# Patient Record
Sex: MALE | Race: WHITE | NOT HISPANIC OR LATINO | Employment: OTHER | URBAN - NONMETROPOLITAN AREA
[De-identification: names, ages, dates, MRNs, and addresses within clinical notes are randomized per-mention and may not be internally consistent; named-entity substitution may affect disease eponyms.]

---

## 2018-05-24 ENCOUNTER — PROCEDURE VISIT (OUTPATIENT)
Dept: OTOLARYNGOLOGY | Facility: CLINIC | Age: 78
End: 2018-05-24

## 2018-05-24 ENCOUNTER — OFFICE VISIT (OUTPATIENT)
Dept: OTOLARYNGOLOGY | Facility: CLINIC | Age: 78
End: 2018-05-24

## 2018-05-24 VITALS
HEIGHT: 74 IN | TEMPERATURE: 97.6 F | HEART RATE: 64 BPM | DIASTOLIC BLOOD PRESSURE: 74 MMHG | SYSTOLIC BLOOD PRESSURE: 145 MMHG | RESPIRATION RATE: 20 BRPM | WEIGHT: 178 LBS | BODY MASS INDEX: 22.84 KG/M2

## 2018-05-24 DIAGNOSIS — H90.A31 MIXED CONDUCTIVE AND SENSORINEURAL HEARING LOSS OF RIGHT EAR WITH RESTRICTED HEARING OF LEFT EAR: ICD-10-CM

## 2018-05-24 DIAGNOSIS — H90.71 MIXED CONDUCTIVE AND SENSORINEURAL HEARING LOSS OF RIGHT EAR, UNSPECIFIED HEARING STATUS ON CONTRALATERAL SIDE: ICD-10-CM

## 2018-05-24 DIAGNOSIS — H62.41 OTOMYCOSIS OF RIGHT EAR: Primary | ICD-10-CM

## 2018-05-24 DIAGNOSIS — H90.A22 SENSORINEURAL HEARING LOSS (SNHL) OF LEFT EAR WITH RESTRICTED HEARING OF RIGHT EAR: Primary | ICD-10-CM

## 2018-05-24 DIAGNOSIS — B36.9 OTOMYCOSIS OF RIGHT EAR: Primary | ICD-10-CM

## 2018-05-24 DIAGNOSIS — H90.5 SENSORINEURAL HEARING LOSS (SNHL) OF LEFT EAR, UNSPECIFIED HEARING STATUS ON CONTRALATERAL SIDE: ICD-10-CM

## 2018-05-24 PROCEDURE — 99203 OFFICE O/P NEW LOW 30 MIN: CPT | Performed by: NURSE PRACTITIONER

## 2018-05-24 RX ORDER — CLOTRIMAZOLE 1 G/ML
SOLUTION TOPICAL 2 TIMES DAILY
Qty: 15 ML | Refills: 0 | Status: SHIPPED | OUTPATIENT
Start: 2018-05-24 | End: 2018-06-03

## 2018-05-24 NOTE — PATIENT INSTRUCTIONS
(1) See the medical provider as scheduled due to the patient complaints as well as the MHL noted at the right ear.  (2) Receive audiological testing following completion of treatment of the right ear.

## 2018-05-24 NOTE — PROGRESS NOTES
PRIMARY CARE PROVIDER: Woody Mathis DO  REFERRING PROVIDER: No ref. provider found    Chief Complaint   Patient presents with   • Ear Problem     hearing loss       Subjective   History of Present Illness:  Aram Nunez is a  78 y.o. male who complains of ear fullness, white otorrhea and decreased hearing. The symptoms are localized to the right ear. The patient has had moderate symptoms. The symptoms have been present for the last 2-3 months. There have been no identified factors that aggravate the symptoms. There have been no factors that have improved the symptoms. He has been treated with oral steroids and antibiotics without improvement in symptoms. He denies otalgia, dizziness, or vertigo.     Review of Systems:  Review of Systems   Constitutional: Negative for chills and fever.   HENT: Positive for ear discharge and hearing loss. Negative for congestion, ear pain, rhinorrhea, sinus pain, sinus pressure, trouble swallowing and voice change.    All other systems reviewed and are negative.      Past History:  Past Medical History:   Diagnosis Date   • HL (hearing loss)      History reviewed. No pertinent surgical history.  History reviewed. No pertinent family history.  Social History   Substance Use Topics   • Smoking status: Never Smoker   • Smokeless tobacco: Never Used   • Alcohol use No     Allergies:  Patient has no known allergies.    Current Outpatient Prescriptions:   •  clotrimazole (LOTRIMIN) 1 % external solution, Apply  topically 2 (Two) Times a Day for 10 days. 3-4 drops in affected ear twice a day, Disp: 15 mL, Rfl: 0      Objective     Vital Signs:  Temp:  [97.6 °F (36.4 °C)] 97.6 °F (36.4 °C)  Heart Rate:  [64] 64  Resp:  [20] 20  BP: (145)/(74) 145/74    Physical Exam:  Physical Exam  CONSTITUTIONAL: well nourished, well-developed, alert, oriented, in no acute distress   COMMUNICATION AND VOICE: able to communicate normally, normal voice quality  HEAD: normocephalic, no lesions,  atraumatic, no tenderness, no masses   FACE: appearance normal, no lesions, no tenderness, no deformities, facial motion symmetric  SALIVARY GLANDS: parotid glands with no tenderness, no swelling, no masses, submandibular glands with normal size, nontender  EYES: ocular motility normal, eyelids normal, orbits normal, no proptosis, conjunctiva normal , pupils equal, round   EARS:  Hearing: response to conversational voice normal bilaterally   External Ears: auricles without lesions  Otoscopic: left EAC and tympanic membrane appearance normal, no lesions, no perforation, normal mobility, no fluid; right EAC with large amount of fungal debris removed with suction, mild inflammation of right EAC and TM, TM without perforation   NOSE:  External Nose: structure normal, no tenderness on palpation, no nasal discharge, no lesions, no evidence of trauma, nostrils patent   ORAL:  Lips: upper and lower lips without lesion   NECK: neck appearance normal, no masses or tenderness  LYMPH NODES: no lymphadenopathy  CHEST/RESPIRATORY: respiratory effort normal, normal breath sounds   CARDIOVASCULAR: rate and rhythm normal, extremities without cyanosis or edema    NEUROLOGIC/PSYCHIATRIC: oriented to time, place and person, mood normal, affect appropriate, CN II-XII intact grossly    Results Review:       Assessment   Assessment:  1. Otomycosis of right ear    2. Mixed conductive and sensorineural hearing loss of right ear, unspecified hearing status on contralateral side    3. Sensorineural hearing loss (SNHL) of left ear, unspecified hearing status on contralateral side        Plan   Plan:    New Medications Ordered This Visit   Medications   • clotrimazole (LOTRIMIN) 1 % external solution     Sig: Apply  topically 2 (Two) Times a Day for 10 days. 3-4 drops in affected ear twice a day     Dispense:  15 mL     Refill:  0     Start Lotrimin. The possible ototoxic side effects of Lotrimin were discussed and patient verbalized  understanding and wished to proceed with use. Do not wear hearing aid in right ear. Dry ear precautions. Call for ear drainage, ear pain, fever over 101, or hearing loss. Call for problems or worsening symptoms. We will repeat audio after symptoms resolve.    Return in about 4 weeks (around 6/21/2018), or if symptoms worsen or fail to improve, for Recheck.    My findings and recommendations were discussed and questions were answered.     Prisca Talley, APRN  05/24/18  3:29 PM

## 2018-06-21 ENCOUNTER — OFFICE VISIT (OUTPATIENT)
Dept: OTOLARYNGOLOGY | Facility: CLINIC | Age: 78
End: 2018-06-21

## 2018-06-21 VITALS
HEART RATE: 80 BPM | BODY MASS INDEX: 22.07 KG/M2 | DIASTOLIC BLOOD PRESSURE: 76 MMHG | HEIGHT: 74 IN | SYSTOLIC BLOOD PRESSURE: 125 MMHG | WEIGHT: 172 LBS | TEMPERATURE: 97 F | RESPIRATION RATE: 20 BRPM

## 2018-06-21 DIAGNOSIS — H69.80 DYSFUNCTION OF EUSTACHIAN TUBE, UNSPECIFIED LATERALITY: ICD-10-CM

## 2018-06-21 DIAGNOSIS — H90.A22 SENSORINEURAL HEARING LOSS (SNHL) OF LEFT EAR WITH RESTRICTED HEARING OF RIGHT EAR: Primary | ICD-10-CM

## 2018-06-21 DIAGNOSIS — H90.71 MIXED CONDUCTIVE AND SENSORINEURAL HEARING LOSS OF RIGHT EAR, UNSPECIFIED HEARING STATUS ON CONTRALATERAL SIDE: ICD-10-CM

## 2018-06-21 DIAGNOSIS — J30.9 ALLERGIC RHINITIS, UNSPECIFIED CHRONICITY, UNSPECIFIED SEASONALITY, UNSPECIFIED TRIGGER: ICD-10-CM

## 2018-06-21 PROCEDURE — 99214 OFFICE O/P EST MOD 30 MIN: CPT | Performed by: NURSE PRACTITIONER

## 2018-06-21 RX ORDER — ACETIC ACID 20.65 MG/ML
3 SOLUTION AURICULAR (OTIC) 3 TIMES DAILY
Qty: 15 ML | Refills: 1 | Status: SHIPPED | OUTPATIENT
Start: 2018-06-21 | End: 2018-07-01

## 2018-06-21 RX ORDER — FLUTICASONE PROPIONATE 50 MCG
2 SPRAY, SUSPENSION (ML) NASAL DAILY
Qty: 1 BOTTLE | Refills: 6 | Status: SHIPPED | OUTPATIENT
Start: 2018-06-21 | End: 2018-07-21

## 2018-06-21 NOTE — PROGRESS NOTES
PRIMARY CARE PROVIDER: Woody Mathis DO  REFERRING PROVIDER: No ref. provider found    Chief Complaint   Patient presents with   • Follow-up     EARS / HEARING LOSS       Subjective   History of Present Illness:  Aram Nunez is a  78 y.o. male who complains of ear fullness, white otorrhea and decreased hearing. The symptoms are localized to the right ear. The patient has had moderate, improving symptoms. The symptoms have been present for the last 3-4 months. There have been no identified factors that aggravate the symptoms. The symptoms have been improved by Lotrimin drops. He has been treated with oral steroids and antibiotics without improvement in symptoms. He denies otalgia, dizziness, or vertigo.     Review of Systems:  Review of Systems   Constitutional: Negative for chills and fever.   HENT: Positive for ear discharge and hearing loss. Negative for congestion, ear pain, rhinorrhea, sinus pain, sinus pressure, trouble swallowing and voice change.    All other systems reviewed and are negative.      Past History:  Past Medical History:   Diagnosis Date   • HL (hearing loss)      History reviewed. No pertinent surgical history.  History reviewed. No pertinent family history.  Social History   Substance Use Topics   • Smoking status: Never Smoker   • Smokeless tobacco: Never Used   • Alcohol use No     Allergies:  Patient has no known allergies.    Current Outpatient Prescriptions:   •  acetic acid (VOSOL) 2 % otic solution, Administer 3 drops to the right ear 3 (Three) Times a Day for 10 days., Disp: 15 mL, Rfl: 1  •  fluticasone (FLONASE) 50 MCG/ACT nasal spray, 2 sprays into each nostril Daily for 30 days. Administer 2 sprays in each nostril for each dose., Disp: 1 bottle, Rfl: 6      Objective     Vital Signs:  Temp:  [97 °F (36.1 °C)] 97 °F (36.1 °C)  Heart Rate:  [80] 80  Resp:  [20] 20  BP: (125)/(76) 125/76    Physical Exam:  Physical Exam  CONSTITUTIONAL: well nourished, well-developed,  alert, oriented, in no acute distress   COMMUNICATION AND VOICE: able to communicate normally, normal voice quality  HEAD: normocephalic, no lesions, atraumatic, no tenderness, no masses   FACE: appearance normal, no lesions, no tenderness, no deformities, facial motion symmetric  SALIVARY GLANDS: parotid glands with no tenderness, no swelling, no masses, submandibular glands with normal size, nontender  EYES: ocular motility normal, eyelids normal, orbits normal, no proptosis, conjunctiva normal , pupils equal, round   EARS:  Hearing: response to conversational voice normal bilaterally   External Ears: auricles without lesions  Otoscopic: left EAC and tympanic membrane appearance normal, no lesions, no perforation, normal mobility, no fluid; right EAC fungal debris resolved; right TM without perforation or effusion, type A tympanogram right ear  NOSE:  External Nose: structure normal, no tenderness on palpation, no nasal discharge, no lesions, no evidence of trauma, nostrils patent   Intranasal Exam: nasal mucosa inflammation, vestibule within normal limits, inferior turbinate normal, nasal septum midline   ORAL:  Lips: upper and lower lips without lesion   NECK: neck appearance normal, no masses or tenderness  LYMPH NODES: no lymphadenopathy  CHEST/RESPIRATORY: respiratory effort normal, normal breath sounds   CARDIOVASCULAR: rate and rhythm normal, extremities without cyanosis or edema    NEUROLOGIC/PSYCHIATRIC: oriented to time, place and person, mood normal, affect appropriate, CN II-XII intact grossly    Results Review:       Assessment   Assessment:  1. Sensorineural hearing loss (SNHL) of left ear with restricted hearing of right ear    2. Mixed conductive and sensorineural hearing loss of right ear, unspecified hearing status on contralateral side    3. Allergic rhinitis, unspecified chronicity, unspecified seasonality, unspecified trigger    4. Dysfunction of Eustachian tube, unspecified laterality         Plan   Plan:    New Medications Ordered This Visit   Medications   • fluticasone (FLONASE) 50 MCG/ACT nasal spray     Si sprays into each nostril Daily for 30 days. Administer 2 sprays in each nostril for each dose.     Dispense:  1 bottle     Refill:  6   • acetic acid (VOSOL) 2 % otic solution     Sig: Administer 3 drops to the right ear 3 (Three) Times a Day for 10 days.     Dispense:  15 mL     Refill:  1     Otomycosis resolved. Dry ear precautions. Volsol PRN. Call for ear drainage, ear pain, fever over 101, or hearing loss. Call for problems or worsening symptoms.     Start Flonase. The proper use of nasal inhalers was discussed including the need for regular use and build up of drug levels before full effects. We will repeat audio on follow-up    Return in about 3 months (around 2018) for With Audio.    My findings and recommendations were discussed and questions were answered.     Prisca Talley, DERREK  18  1:46 PM

## 2018-12-24 ENCOUNTER — APPOINTMENT (OUTPATIENT)
Dept: GENERAL RADIOLOGY | Facility: HOSPITAL | Age: 78
End: 2018-12-24

## 2018-12-24 ENCOUNTER — APPOINTMENT (OUTPATIENT)
Dept: CT IMAGING | Facility: HOSPITAL | Age: 78
End: 2018-12-24

## 2018-12-24 ENCOUNTER — HOSPITAL ENCOUNTER (EMERGENCY)
Facility: HOSPITAL | Age: 78
Discharge: HOME | End: 2018-12-24
Attending: EMERGENCY MEDICINE | Admitting: EMERGENCY MEDICINE

## 2018-12-24 VITALS
HEART RATE: 80 BPM | BODY MASS INDEX: 21.66 KG/M2 | DIASTOLIC BLOOD PRESSURE: 86 MMHG | OXYGEN SATURATION: 99 % | WEIGHT: 168.8 LBS | SYSTOLIC BLOOD PRESSURE: 154 MMHG | HEIGHT: 74 IN | TEMPERATURE: 98 F | RESPIRATION RATE: 18 BRPM

## 2018-12-24 DIAGNOSIS — R07.9 CHEST PAIN, UNSPECIFIED TYPE: ICD-10-CM

## 2018-12-24 DIAGNOSIS — R91.8 LUNG MASS: Primary | ICD-10-CM

## 2018-12-24 LAB
ALBUMIN SERPL-MCNC: 4.3 G/DL (ref 3.5–5)
ALBUMIN/GLOB SERPL: 1.5 G/DL (ref 1.1–2.5)
ALP SERPL-CCNC: 55 U/L (ref 24–120)
ALT SERPL W P-5'-P-CCNC: 33 U/L (ref 0–54)
ANION GAP SERPL CALCULATED.3IONS-SCNC: 12 MMOL/L (ref 4–13)
APTT PPP: 35.8 SECONDS (ref 24.1–34.8)
AST SERPL-CCNC: 28 U/L (ref 7–45)
BASOPHILS # BLD AUTO: 0.04 10*3/MM3 (ref 0–0.2)
BASOPHILS NFR BLD AUTO: 0.5 % (ref 0–2)
BILIRUB SERPL-MCNC: 0.8 MG/DL (ref 0.1–1)
BUN BLD-MCNC: 22 MG/DL (ref 5–21)
BUN/CREAT SERPL: 21.8 (ref 7–25)
CALCIUM SPEC-SCNC: 9.4 MG/DL (ref 8.4–10.4)
CHLORIDE SERPL-SCNC: 101 MMOL/L (ref 98–110)
CO2 SERPL-SCNC: 27 MMOL/L (ref 24–31)
CREAT BLD-MCNC: 1.01 MG/DL (ref 0.5–1.4)
DEPRECATED RDW RBC AUTO: 40.7 FL (ref 40–54)
EOSINOPHIL # BLD AUTO: 0.18 10*3/MM3 (ref 0–0.7)
EOSINOPHIL NFR BLD AUTO: 2.2 % (ref 0–4)
ERYTHROCYTE [DISTWIDTH] IN BLOOD BY AUTOMATED COUNT: 12.7 % (ref 12–15)
GFR SERPL CREATININE-BSD FRML MDRD: 71 ML/MIN/1.73
GLOBULIN UR ELPH-MCNC: 2.9 GM/DL
GLUCOSE BLD-MCNC: 184 MG/DL (ref 70–100)
HCT VFR BLD AUTO: 42.6 % (ref 40–52)
HGB BLD-MCNC: 15 G/DL (ref 14–18)
HOLD SPECIMEN: NORMAL
HOLD SPECIMEN: NORMAL
IMM GRANULOCYTES # BLD AUTO: 0.02 10*3/MM3 (ref 0–0.03)
IMM GRANULOCYTES NFR BLD AUTO: 0.2 % (ref 0–5)
INR PPP: 1.04 (ref 0.91–1.09)
LYMPHOCYTES # BLD AUTO: 2.11 10*3/MM3 (ref 0.72–4.86)
LYMPHOCYTES NFR BLD AUTO: 26.1 % (ref 15–45)
MCH RBC QN AUTO: 30.9 PG (ref 28–32)
MCHC RBC AUTO-ENTMCNC: 35.2 G/DL (ref 33–36)
MCV RBC AUTO: 87.8 FL (ref 82–95)
MONOCYTES # BLD AUTO: 1.04 10*3/MM3 (ref 0.19–1.3)
MONOCYTES NFR BLD AUTO: 12.9 % (ref 4–12)
NEUTROPHILS # BLD AUTO: 4.68 10*3/MM3 (ref 1.87–8.4)
NEUTROPHILS NFR BLD AUTO: 58.1 % (ref 39–78)
NRBC BLD AUTO-RTO: 0 /100 WBC (ref 0–0)
PLATELET # BLD AUTO: 183 10*3/MM3 (ref 130–400)
PMV BLD AUTO: 10.7 FL (ref 6–12)
POTASSIUM BLD-SCNC: 4.2 MMOL/L (ref 3.5–5.3)
PROT SERPL-MCNC: 7.2 G/DL (ref 6.3–8.7)
PROTHROMBIN TIME: 13.9 SECONDS (ref 11.9–14.6)
RBC # BLD AUTO: 4.85 10*6/MM3 (ref 4.8–5.9)
SODIUM BLD-SCNC: 140 MMOL/L (ref 135–145)
TROPONIN I SERPL-MCNC: <0.012 NG/ML (ref 0–0.03)
WBC NRBC COR # BLD: 8.07 10*3/MM3 (ref 4.8–10.8)
WHOLE BLOOD HOLD SPECIMEN: NORMAL
WHOLE BLOOD HOLD SPECIMEN: NORMAL

## 2018-12-24 PROCEDURE — 0 IOPAMIDOL PER 1 ML: Performed by: EMERGENCY MEDICINE

## 2018-12-24 PROCEDURE — G0378 HOSPITAL OBSERVATION PER HR: HCPCS

## 2018-12-24 PROCEDURE — 84484 ASSAY OF TROPONIN QUANT: CPT

## 2018-12-24 PROCEDURE — 71275 CT ANGIOGRAPHY CHEST: CPT

## 2018-12-24 PROCEDURE — 71045 X-RAY EXAM CHEST 1 VIEW: CPT

## 2018-12-24 PROCEDURE — 85025 COMPLETE CBC W/AUTO DIFF WBC: CPT

## 2018-12-24 PROCEDURE — 93005 ELECTROCARDIOGRAM TRACING: CPT

## 2018-12-24 PROCEDURE — 85610 PROTHROMBIN TIME: CPT | Performed by: EMERGENCY MEDICINE

## 2018-12-24 PROCEDURE — 85730 THROMBOPLASTIN TIME PARTIAL: CPT | Performed by: EMERGENCY MEDICINE

## 2018-12-24 PROCEDURE — 93010 ELECTROCARDIOGRAM REPORT: CPT | Performed by: INTERNAL MEDICINE

## 2018-12-24 PROCEDURE — 99284 EMERGENCY DEPT VISIT MOD MDM: CPT

## 2018-12-24 PROCEDURE — 80053 COMPREHEN METABOLIC PANEL: CPT

## 2018-12-24 RX ORDER — SODIUM CHLORIDE 0.9 % (FLUSH) 0.9 %
10 SYRINGE (ML) INJECTION AS NEEDED
Status: DISCONTINUED | OUTPATIENT
Start: 2018-12-24 | End: 2018-12-24 | Stop reason: HOSPADM

## 2018-12-24 RX ORDER — ASPIRIN 81 MG/1
324 TABLET, CHEWABLE ORAL ONCE
Status: COMPLETED | OUTPATIENT
Start: 2018-12-24 | End: 2018-12-24

## 2018-12-24 RX ORDER — HYDROCODONE BITARTRATE AND ACETAMINOPHEN 7.5; 325 MG/1; MG/1
1 TABLET ORAL EVERY 8 HOURS PRN
Qty: 10 TABLET | Refills: 0 | Status: SHIPPED | OUTPATIENT
Start: 2018-12-24 | End: 2019-07-16

## 2018-12-24 RX ADMIN — IOPAMIDOL 110 ML: 755 INJECTION, SOLUTION INTRAVENOUS at 10:05

## 2018-12-24 RX ADMIN — ASPIRIN 81 MG 324 MG: 81 TABLET ORAL at 09:07

## 2018-12-26 ENCOUNTER — HOSPITAL ENCOUNTER (OUTPATIENT)
Dept: CT IMAGING | Facility: HOSPITAL | Age: 78
Discharge: HOME | End: 2018-12-26
Admitting: INTERNAL MEDICINE

## 2018-12-26 ENCOUNTER — HOSPITAL ENCOUNTER (OUTPATIENT)
Dept: GENERAL RADIOLOGY | Facility: HOSPITAL | Age: 78
Discharge: HOME | End: 2018-12-26

## 2018-12-26 VITALS
HEIGHT: 74 IN | HEART RATE: 81 BPM | SYSTOLIC BLOOD PRESSURE: 140 MMHG | OXYGEN SATURATION: 99 % | DIASTOLIC BLOOD PRESSURE: 58 MMHG | TEMPERATURE: 97.4 F | BODY MASS INDEX: 21.3 KG/M2 | RESPIRATION RATE: 17 BRPM | WEIGHT: 166 LBS

## 2018-12-26 DIAGNOSIS — R22.2 MASS IN CHEST: ICD-10-CM

## 2018-12-26 LAB
APTT PPP: 33 SECONDS (ref 24.1–34.8)
INR PPP: 1.02 (ref 0.91–1.09)
PLATELET # BLD AUTO: 198 10*3/MM3 (ref 130–400)
PROTHROMBIN TIME: 13.7 SECONDS (ref 11.9–14.6)

## 2018-12-26 PROCEDURE — 25010000002 MIDAZOLAM PER 1 MG: Performed by: RADIOLOGY

## 2018-12-26 PROCEDURE — 25010000002 FENTANYL CITRATE (PF) 100 MCG/2ML SOLUTION: Performed by: RADIOLOGY

## 2018-12-26 PROCEDURE — 88275 CYTOGENETICS 100-300: CPT

## 2018-12-26 PROCEDURE — A9270 NON-COVERED ITEM OR SERVICE: HCPCS

## 2018-12-26 PROCEDURE — 88185 FLOWCYTOMETRY/TC ADD-ON: CPT | Performed by: INTERNAL MEDICINE

## 2018-12-26 PROCEDURE — 85610 PROTHROMBIN TIME: CPT | Performed by: INTERNAL MEDICINE

## 2018-12-26 PROCEDURE — 88334 PATH CONSLTJ SURG CYTO XM EA: CPT | Performed by: INTERNAL MEDICINE

## 2018-12-26 PROCEDURE — 71045 X-RAY EXAM CHEST 1 VIEW: CPT

## 2018-12-26 PROCEDURE — 85049 AUTOMATED PLATELET COUNT: CPT | Performed by: INTERNAL MEDICINE

## 2018-12-26 PROCEDURE — 77012 CT SCAN FOR NEEDLE BIOPSY: CPT

## 2018-12-26 PROCEDURE — 88305 TISSUE EXAM BY PATHOLOGIST: CPT | Performed by: INTERNAL MEDICINE

## 2018-12-26 PROCEDURE — 88271 CYTOGENETICS DNA PROBE: CPT

## 2018-12-26 PROCEDURE — 85730 THROMBOPLASTIN TIME PARTIAL: CPT | Performed by: INTERNAL MEDICINE

## 2018-12-26 PROCEDURE — 88172 CYTP DX EVAL FNA 1ST EA SITE: CPT | Performed by: INTERNAL MEDICINE

## 2018-12-26 PROCEDURE — 88185 FLOWCYTOMETRY/TC ADD-ON: CPT

## 2018-12-26 PROCEDURE — 63710000001 HYDROCODONE-ACETAMINOPHEN 7.5-325 MG TABLET

## 2018-12-26 PROCEDURE — 88184 FLOWCYTOMETRY/ TC 1 MARKER: CPT | Performed by: INTERNAL MEDICINE

## 2018-12-26 PROCEDURE — 88323 CONSLTJ&REPRT MATRL PREP SLD: CPT

## 2018-12-26 PROCEDURE — 88342 IMHCHEM/IMCYTCHM 1ST ANTB: CPT | Performed by: INTERNAL MEDICINE

## 2018-12-26 PROCEDURE — 88341 IMHCHEM/IMCYTCHM EA ADD ANTB: CPT | Performed by: INTERNAL MEDICINE

## 2018-12-26 PROCEDURE — 63710000001 HYDROCODONE-ACETAMINOPHEN 5-325 MG TABLET

## 2018-12-26 PROCEDURE — 88173 CYTOPATH EVAL FNA REPORT: CPT | Performed by: INTERNAL MEDICINE

## 2018-12-26 RX ORDER — SODIUM CHLORIDE 0.9 % (FLUSH) 0.9 %
3-10 SYRINGE (ML) INJECTION AS NEEDED
Status: DISCONTINUED | OUTPATIENT
Start: 2018-12-26 | End: 2018-12-27 | Stop reason: HOSPADM

## 2018-12-26 RX ORDER — HYDROCODONE BITARTRATE AND ACETAMINOPHEN 7.5; 325 MG/1; MG/1
1 TABLET ORAL EVERY 6 HOURS PRN
Status: DISCONTINUED | OUTPATIENT
Start: 2018-12-26 | End: 2018-12-27 | Stop reason: HOSPADM

## 2018-12-26 RX ORDER — TAMSULOSIN HYDROCHLORIDE 0.4 MG/1
1 CAPSULE ORAL NIGHTLY
COMMUNITY
End: 2019-03-28

## 2018-12-26 RX ORDER — HYDROCODONE BITARTRATE AND ACETAMINOPHEN 5; 325 MG/1; MG/1
1 TABLET ORAL ONCE
Status: COMPLETED | OUTPATIENT
Start: 2018-12-26 | End: 2018-12-26

## 2018-12-26 RX ORDER — LORATADINE 10 MG/1
10 TABLET ORAL DAILY
COMMUNITY

## 2018-12-26 RX ORDER — FINASTERIDE 5 MG/1
5 TABLET, FILM COATED ORAL DAILY
COMMUNITY
End: 2019-03-28

## 2018-12-26 RX ORDER — MIDAZOLAM HYDROCHLORIDE 1 MG/ML
INJECTION INTRAMUSCULAR; INTRAVENOUS
Status: COMPLETED | OUTPATIENT
Start: 2018-12-26 | End: 2018-12-26

## 2018-12-26 RX ORDER — LIDOCAINE HYDROCHLORIDE 10 MG/ML
INJECTION, SOLUTION INFILTRATION; PERINEURAL
Status: COMPLETED | OUTPATIENT
Start: 2018-12-26 | End: 2018-12-26

## 2018-12-26 RX ORDER — FENTANYL CITRATE 50 UG/ML
INJECTION, SOLUTION INTRAMUSCULAR; INTRAVENOUS
Status: COMPLETED | OUTPATIENT
Start: 2018-12-26 | End: 2018-12-26

## 2018-12-26 RX ORDER — SODIUM CHLORIDE 0.9 % (FLUSH) 0.9 %
3 SYRINGE (ML) INJECTION EVERY 12 HOURS SCHEDULED
Status: DISCONTINUED | OUTPATIENT
Start: 2018-12-26 | End: 2018-12-27 | Stop reason: HOSPADM

## 2018-12-26 RX ORDER — HYDROCODONE BITARTRATE AND ACETAMINOPHEN 7.5; 325 MG/1; MG/1
TABLET ORAL
Status: COMPLETED
Start: 2018-12-26 | End: 2018-12-26

## 2018-12-26 RX ORDER — HYDROCODONE BITARTRATE AND ACETAMINOPHEN 5; 325 MG/1; MG/1
TABLET ORAL
Status: COMPLETED
Start: 2018-12-26 | End: 2018-12-26

## 2018-12-26 RX ADMIN — FENTANYL CITRATE 25 MCG: 50 INJECTION, SOLUTION INTRAMUSCULAR; INTRAVENOUS at 09:09

## 2018-12-26 RX ADMIN — FENTANYL CITRATE 25 MCG: 50 INJECTION, SOLUTION INTRAMUSCULAR; INTRAVENOUS at 08:57

## 2018-12-26 RX ADMIN — MIDAZOLAM 1 MG: 1 INJECTION INTRAMUSCULAR; INTRAVENOUS at 08:57

## 2018-12-26 RX ADMIN — Medication 3 ML: at 09:00

## 2018-12-26 RX ADMIN — HYDROCODONE BITARTRATE AND ACETAMINOPHEN 1 TABLET: 7.5; 325 TABLET ORAL at 10:03

## 2018-12-26 RX ADMIN — MIDAZOLAM 1 MG: 1 INJECTION INTRAMUSCULAR; INTRAVENOUS at 09:09

## 2018-12-26 RX ADMIN — LIDOCAINE HYDROCHLORIDE 10 ML: 10 INJECTION, SOLUTION INFILTRATION; PERINEURAL at 09:01

## 2018-12-26 RX ADMIN — HYDROCODONE BITARTRATE AND ACETAMINOPHEN 1 TABLET: 5; 325 TABLET ORAL at 11:13

## 2018-12-26 NOTE — NURSING NOTE
"Pt remains with c/o 8/10 upper left chest pain. States \"feels like some of the old pain I have been having plus some new.\" Bandaid site stable without bleeding, drainage or swelling noted. Spoke with Dr De La Torre radiologist that did procedure and discussed above. Orders received.  "

## 2018-12-26 NOTE — PRE-PROCEDURE NOTE
Risk, Benefits, and Alternatives discussed with the patient.  History and Physical reviewed, and no changes.  Plan is for moderate sedation, and the patient agrees to proceed with procedure.

## 2019-01-05 ENCOUNTER — TELEPHONE (OUTPATIENT)
Dept: INTERNAL MEDICINE | Facility: HOSPITAL | Age: 79
End: 2019-01-05

## 2019-01-05 NOTE — TELEPHONE ENCOUNTER
I contacted the patient again this afternoon to further discuss his biopsy results and make sure that he is getting appropriate followup. He was able to follow with Dr. Traylor at his clinic on Thursday, 1/3.  They are anticipating a referral to Dr. Walton.  They contacted Dr. Payne's office on Friday, but an appointment had not been set up yet as they are still awaiting information from Dr. Traylor' office. I asked him to contact my medical assistant, Hoa Navarro, on Monday afternoon if they still do not have an appointment at that point in time.  I will try and help expedite an appointment if needed.    Reagan Tierney,   01/05/19  4:01 PM

## 2019-01-10 ENCOUNTER — TRANSCRIBE ORDERS (OUTPATIENT)
Dept: ADMINISTRATIVE | Facility: HOSPITAL | Age: 79
End: 2019-01-10

## 2019-01-10 DIAGNOSIS — R91.1 SOLITARY PULMONARY NODULE: Primary | ICD-10-CM

## 2019-01-13 ENCOUNTER — NURSE TRIAGE (OUTPATIENT)
Dept: CALL CENTER | Facility: HOSPITAL | Age: 79
End: 2019-01-13

## 2019-01-13 NOTE — TELEPHONE ENCOUNTER
" daughter has questions concerning PET scan wanting to know if can take a pain pill prior to exam, unsure, informed to bring medication with him and if he can to take the medication  Reason for Disposition  • Caller has NON-URGENT medication question about med that PCP prescribed and triager unable to answer question    Additional Information  • Negative: Drug overdose and nurse unable to answer question  • Negative: Caller requesting information not related to medicine  • Negative: Caller requesting a prescription for Strep throat and has a positive culture result  • Negative: Rash while taking a medication or within 3 days of stopping it  • Negative: Immunization reaction suspected  • Negative: [1] Asthma and [2] having symptoms of asthma (cough, wheezing, etc)  • Negative: MORE THAN A DOUBLE DOSE of a prescription or over-the-counter (OTC) drug  • Negative: [1] DOUBLE DOSE (an extra dose or lesser amount) of over-the-counter (OTC) drug AND [2] any symptoms (e.g., dizziness, nausea, pain, sleepiness)  • Negative: [1] DOUBLE DOSE (an extra dose or lesser amount) of prescription drug AND [2] any symptoms (e.g., dizziness, nausea, pain, sleepiness)  • Negative: Took another person's prescription drug  • Negative: [1] DOUBLE DOSE (an extra dose or lesser amount) of prescription drug AND [2] NO symptoms (Exception: a double dose of antibiotics)  • Negative: Diabetes drug error or overdose (e.g., insulin or extra dose)  • Negative: [1] Request for URGENT new prescription or refill of \"essential\" medication (i.e., likelihood of harm to patient if not taken) AND [2] triager unable to fill per unit policy  • Negative: [1] Prescription not at pharmacy AND [2] was prescribed today by PCP  • Negative: Pharmacy calling with prescription questions and triager unable to answer question  • Negative: Caller has URGENT medication question about med that PCP prescribed and triager unable to answer question    Answer Assessment - " "Initial Assessment Questions  1. SYMPTOMS: \"Do you have any symptoms?\"      none  2. SEVERITY: If symptoms are present, ask \"Are they mild, moderate or severe?\"      mild    Protocols used: MEDICATION QUESTION CALL-ADULT-    "

## 2019-01-14 ENCOUNTER — TRANSCRIBE ORDERS (OUTPATIENT)
Dept: ADMINISTRATIVE | Facility: HOSPITAL | Age: 79
End: 2019-01-14

## 2019-01-14 ENCOUNTER — HOSPITAL ENCOUNTER (OUTPATIENT)
Dept: CT IMAGING | Facility: HOSPITAL | Age: 79
Discharge: HOME | End: 2019-01-14
Attending: INTERNAL MEDICINE | Admitting: INTERNAL MEDICINE

## 2019-01-14 ENCOUNTER — HOSPITAL ENCOUNTER (OUTPATIENT)
Dept: CT IMAGING | Facility: HOSPITAL | Age: 79
Discharge: HOME | End: 2019-01-14
Attending: INTERNAL MEDICINE

## 2019-01-14 DIAGNOSIS — Z01.818 ENCOUNTER FOR OTHER PREPROCEDURAL EXAMINATION: ICD-10-CM

## 2019-01-14 DIAGNOSIS — R91.1 SOLITARY PULMONARY NODULE: ICD-10-CM

## 2019-01-14 DIAGNOSIS — C83.39 DIFFUSE LARGE B-CELL LYMPHOMA OF EXTRANODAL SITE (HCC): Primary | ICD-10-CM

## 2019-01-14 PROCEDURE — A9552 F18 FDG: HCPCS | Performed by: INTERNAL MEDICINE

## 2019-01-14 PROCEDURE — 0 FLUDEOXYGLUCOSE F18 SOLUTION: Performed by: INTERNAL MEDICINE

## 2019-01-14 PROCEDURE — 78815 PET IMAGE W/CT SKULL-THIGH: CPT

## 2019-01-14 RX ADMIN — FLUDEOXYGLUCOSE F18 1 DOSE: 300 INJECTION INTRAVENOUS at 10:00

## 2019-01-15 ENCOUNTER — NURSE TRIAGE (OUTPATIENT)
Dept: CALL CENTER | Facility: HOSPITAL | Age: 79
End: 2019-01-15

## 2019-01-16 ENCOUNTER — HOSPITAL ENCOUNTER (OUTPATIENT)
Dept: CARDIOLOGY | Facility: HOSPITAL | Age: 79
Discharge: HOME | End: 2019-01-16
Attending: INTERNAL MEDICINE | Admitting: INTERNAL MEDICINE

## 2019-01-16 VITALS
SYSTOLIC BLOOD PRESSURE: 150 MMHG | HEIGHT: 74 IN | BODY MASS INDEX: 21.3 KG/M2 | DIASTOLIC BLOOD PRESSURE: 80 MMHG | WEIGHT: 166.01 LBS

## 2019-01-16 DIAGNOSIS — Z01.818 ENCOUNTER FOR OTHER PREPROCEDURAL EXAMINATION: ICD-10-CM

## 2019-01-16 DIAGNOSIS — C83.39 DIFFUSE LARGE B-CELL LYMPHOMA OF EXTRANODAL SITE (HCC): ICD-10-CM

## 2019-01-16 LAB
BH CV ECHO MEAS - AO MAX PG (FULL): 3.9 MMHG
BH CV ECHO MEAS - AO MAX PG: 7.3 MMHG
BH CV ECHO MEAS - AO MEAN PG (FULL): 3 MMHG
BH CV ECHO MEAS - AO MEAN PG: 5 MMHG
BH CV ECHO MEAS - AO ROOT AREA (BSA CORRECTED): 1.7
BH CV ECHO MEAS - AO ROOT AREA: 9.6 CM^2
BH CV ECHO MEAS - AO ROOT DIAM: 3.5 CM
BH CV ECHO MEAS - AO V2 MAX: 135 CM/SEC
BH CV ECHO MEAS - AO V2 MEAN: 102 CM/SEC
BH CV ECHO MEAS - AO V2 VTI: 33.7 CM
BH CV ECHO MEAS - AVA(I,A): 2.5 CM^2
BH CV ECHO MEAS - AVA(I,D): 2.5 CM^2
BH CV ECHO MEAS - AVA(V,A): 2.4 CM^2
BH CV ECHO MEAS - AVA(V,D): 2.4 CM^2
BH CV ECHO MEAS - BSA(HAYCOCK): 2 M^2
BH CV ECHO MEAS - BSA: 2 M^2
BH CV ECHO MEAS - BZI_BMI: 21.3 KILOGRAMS/M^2
BH CV ECHO MEAS - BZI_METRIC_HEIGHT: 188 CM
BH CV ECHO MEAS - BZI_METRIC_WEIGHT: 75.3 KG
BH CV ECHO MEAS - EDV(CUBED): 64 ML
BH CV ECHO MEAS - EDV(MOD-SP4): 127 ML
BH CV ECHO MEAS - EDV(TEICH): 70 ML
BH CV ECHO MEAS - EF(CUBED): 65.7 %
BH CV ECHO MEAS - EF(MOD-SP4): 68.5 %
BH CV ECHO MEAS - EF(TEICH): 57.8 %
BH CV ECHO MEAS - ESV(CUBED): 22 ML
BH CV ECHO MEAS - ESV(MOD-SP4): 40 ML
BH CV ECHO MEAS - ESV(TEICH): 29.6 ML
BH CV ECHO MEAS - FS: 30 %
BH CV ECHO MEAS - IVS/LVPW: 1
BH CV ECHO MEAS - IVSD: 1.3 CM
BH CV ECHO MEAS - LA DIMENSION: 3.9 CM
BH CV ECHO MEAS - LA/AO: 1.1
BH CV ECHO MEAS - LAT PEAK E' VEL: 8.9 CM/SEC
BH CV ECHO MEAS - LV DIASTOLIC VOL/BSA (35-75): 63.3 ML/M^2
BH CV ECHO MEAS - LV MASS(C)D: 186.5 GRAMS
BH CV ECHO MEAS - LV MASS(C)DI: 92.9 GRAMS/M^2
BH CV ECHO MEAS - LV MAX PG: 3.4 MMHG
BH CV ECHO MEAS - LV MEAN PG: 2 MMHG
BH CV ECHO MEAS - LV SYSTOLIC VOL/BSA (12-30): 19.9 ML/M^2
BH CV ECHO MEAS - LV V1 MAX: 92.4 CM/SEC
BH CV ECHO MEAS - LV V1 MEAN: 63.8 CM/SEC
BH CV ECHO MEAS - LV V1 VTI: 24.2 CM
BH CV ECHO MEAS - LVIDD: 4 CM
BH CV ECHO MEAS - LVIDS: 2.8 CM
BH CV ECHO MEAS - LVLD AP4: 8.2 CM
BH CV ECHO MEAS - LVLS AP4: 6.1 CM
BH CV ECHO MEAS - LVOT AREA (M): 3.5 CM^2
BH CV ECHO MEAS - LVOT AREA: 3.5 CM^2
BH CV ECHO MEAS - LVOT DIAM: 2.1 CM
BH CV ECHO MEAS - LVPWD: 1.3 CM
BH CV ECHO MEAS - MED PEAK E' VEL: 6 CM/SEC
BH CV ECHO MEAS - MR ALIAS VEL: 29.8 CM/SEC
BH CV ECHO MEAS - MR ERO: 0.14 CM^2
BH CV ECHO MEAS - MR FLOW RATE: 67.4 CM^3/SEC
BH CV ECHO MEAS - MR MAX PG: 99.9 MMHG
BH CV ECHO MEAS - MR MAX VEL: 495 CM/SEC
BH CV ECHO MEAS - MR MEAN PG: 76.5 MMHG
BH CV ECHO MEAS - MR MEAN VEL: 414 CM/SEC
BH CV ECHO MEAS - MR PISA RADIUS: 0.6 CM
BH CV ECHO MEAS - MR PISA: 2.3 CM^2
BH CV ECHO MEAS - MR VOLUME: 20.2 ML
BH CV ECHO MEAS - MR VTI: 148.5 CM
BH CV ECHO MEAS - MV A MAX VEL: 42.2 CM/SEC
BH CV ECHO MEAS - MV DEC TIME: 0.25 SEC
BH CV ECHO MEAS - MV E MAX VEL: 87.8 CM/SEC
BH CV ECHO MEAS - MV E/A: 2.1
BH CV ECHO MEAS - RAP SYSTOLE: 10 MMHG
BH CV ECHO MEAS - RVSP: 37.5 MMHG
BH CV ECHO MEAS - SI(AO): 161.5 ML/M^2
BH CV ECHO MEAS - SI(CUBED): 20.9 ML/M^2
BH CV ECHO MEAS - SI(LVOT): 41.7 ML/M^2
BH CV ECHO MEAS - SI(MOD-SP4): 43.3 ML/M^2
BH CV ECHO MEAS - SI(TEICH): 20.1 ML/M^2
BH CV ECHO MEAS - SV(AO): 324.2 ML
BH CV ECHO MEAS - SV(CUBED): 42 ML
BH CV ECHO MEAS - SV(LVOT): 83.8 ML
BH CV ECHO MEAS - SV(MOD-SP4): 87 ML
BH CV ECHO MEAS - SV(TEICH): 40.4 ML
BH CV ECHO MEAS - TR MAX VEL: 262 CM/SEC
BH CV ECHO MEASUREMENTS AVERAGE E/E' RATIO: 11.79
LEFT ATRIUM VOLUME INDEX: 23.3 ML/M2
LEFT ATRIUM VOLUME: 46.9 CM3
MAXIMAL PREDICTED HEART RATE: 142 BPM
STRESS TARGET HR: 121 BPM

## 2019-01-16 PROCEDURE — 93306 TTE W/DOPPLER COMPLETE: CPT

## 2019-01-16 PROCEDURE — 25010000002 PERFLUTREN 6.52 MG/ML SUSPENSION: Performed by: INTERNAL MEDICINE

## 2019-01-16 PROCEDURE — 93306 TTE W/DOPPLER COMPLETE: CPT | Performed by: INTERNAL MEDICINE

## 2019-01-16 RX ADMIN — PERFLUTREN 8.48 MG: 6.52 INJECTION, SUSPENSION INTRAVENOUS at 09:46

## 2019-01-16 NOTE — TELEPHONE ENCOUNTER
"    Reason for Disposition  • [1] Caller requesting NON-URGENT health information AND [2] PCP's office is the best resource    Additional Information  • Negative: [1] Caller is not with the adult (patient) AND [2] reporting urgent symptoms  • Negative: Lab result questions  • Negative: Medication questions  • Negative: Caller cannot be reached by phone  • Negative: Caller has already spoken to PCP or another triager  • Negative: RN needs further essential information from caller in order to complete triage  • Negative: Requesting regular office appointment    Answer Assessment - Initial Assessment Questions  1. REASON FOR CALL or QUESTION: \"What is your reason for calling today?\" or \"How can I best help you?\" or \"What question do you have that I can help answer?\"      Calling about  who is scheduled to have a heart echo in the morning. They want to know if he can eat before this. Explained that yes he could eat prior to this test. Then after further conversation she stated he would then go to the office for a bone marrow biopsy. I explained that before eating he would need to call the office and see if there were any dietary restrictions before this test.    Protocols used: INFORMATION ONLY CALL-ADULT-      "

## 2019-01-29 ENCOUNTER — HOSPITAL ENCOUNTER (OUTPATIENT)
Dept: GENERAL RADIOLOGY | Age: 79
Discharge: HOME | End: 2019-01-29
Payer: MEDICARE

## 2019-01-29 ENCOUNTER — HOSPITAL ENCOUNTER (OUTPATIENT)
Dept: LAB | Age: 79
Discharge: HOME | End: 2019-01-29
Payer: MEDICARE

## 2019-01-29 ENCOUNTER — HOSPITAL ENCOUNTER (OUTPATIENT)
Dept: NON INVASIVE DIAGNOSTICS | Age: 79
Discharge: HOME | End: 2019-01-29
Payer: MEDICARE

## 2019-01-29 ENCOUNTER — ANESTHESIA EVENT (OUTPATIENT)
Dept: OPERATING ROOM | Age: 79
End: 2019-01-29

## 2019-01-29 ENCOUNTER — HOSPITAL ENCOUNTER (OUTPATIENT)
Dept: PREADMISSION TESTING | Age: 79
Setting detail: OUTPATIENT SURGERY
Discharge: HOME | End: 2019-02-02

## 2019-01-29 VITALS — BODY MASS INDEX: 21.43 KG/M2 | HEIGHT: 74 IN | WEIGHT: 167 LBS

## 2019-01-29 LAB
ALBUMIN SERPL-MCNC: 4.2 G/DL (ref 3.5–5.2)
ALP BLD-CCNC: 72 U/L (ref 40–130)
ALT SERPL-CCNC: 31 U/L (ref 5–41)
ANION GAP SERPL CALCULATED.3IONS-SCNC: 15 MMOL/L (ref 7–19)
AST SERPL-CCNC: 15 U/L (ref 5–40)
ATYPICAL LYMPHOCYTE RELATIVE PERCENT: 6 % (ref 0–8)
BANDED NEUTROPHILS RELATIVE PERCENT: 18 % (ref 0–5)
BASOPHILS ABSOLUTE: 0 K/UL (ref 0–0.2)
BASOPHILS MANUAL: 0 %
BASOPHILS RELATIVE PERCENT: 0 % (ref 0–1)
BILIRUB SERPL-MCNC: 0.3 MG/DL (ref 0.2–1.2)
BUN BLDV-MCNC: 20 MG/DL (ref 8–23)
CALCIUM SERPL-MCNC: 10 MG/DL (ref 8.8–10.2)
CHLORIDE BLD-SCNC: 91 MMOL/L (ref 98–111)
CO2: 26 MMOL/L (ref 22–29)
CREAT SERPL-MCNC: 1.1 MG/DL (ref 0.5–1.2)
EOSINOPHILS ABSOLUTE: 0 K/UL (ref 0–0.6)
EOSINOPHILS RELATIVE PERCENT: 0 % (ref 0–5)
GFR NON-AFRICAN AMERICAN: >60
GLUCOSE BLD-MCNC: 196 MG/DL (ref 74–109)
HCT VFR BLD CALC: 41.3 % (ref 42–52)
HEMOGLOBIN: 14.3 G/DL (ref 14–18)
LYMPHOCYTES ABSOLUTE: 2.5 K/UL (ref 1.1–4.5)
LYMPHOCYTES RELATIVE PERCENT: 22 % (ref 20–40)
MCH RBC QN AUTO: 30.3 PG (ref 27–31)
MCHC RBC AUTO-ENTMCNC: 34.6 G/DL (ref 33–37)
MCV RBC AUTO: 87.5 FL (ref 80–94)
MONOCYTES ABSOLUTE: 0.9 K/UL (ref 0–0.9)
MONOCYTES RELATIVE PERCENT: 10 % (ref 0–10)
NEUTROPHILS ABSOLUTE: 5.6 K/UL (ref 1.5–7.5)
NEUTROPHILS MANUAL: 44 %
NEUTROPHILS RELATIVE PERCENT: 44 % (ref 50–65)
PDW BLD-RTO: 12.3 % (ref 11.5–14.5)
PLATELET # BLD: 172 K/UL (ref 130–400)
PLATELET SLIDE REVIEW: ADEQUATE
PMV BLD AUTO: 11.2 FL (ref 9.4–12.4)
POLYCHROMASIA: ABNORMAL
POTASSIUM SERPL-SCNC: 4.9 MMOL/L (ref 3.5–5)
RBC # BLD: 4.72 M/UL (ref 4.7–6.1)
SODIUM BLD-SCNC: 132 MMOL/L (ref 136–145)
TOTAL PROTEIN: 7.3 G/DL (ref 6.6–8.7)
WBC # BLD: 9 K/UL (ref 4.8–10.8)

## 2019-01-29 PROCEDURE — 93005 ELECTROCARDIOGRAM TRACING: CPT

## 2019-01-29 PROCEDURE — 71045 X-RAY EXAM CHEST 1 VIEW: CPT

## 2019-01-29 RX ORDER — HYDROCODONE BITARTRATE AND ACETAMINOPHEN 7.5; 325 MG/1; MG/1
1 TABLET ORAL EVERY 4 HOURS PRN
COMMUNITY
End: 2019-11-18 | Stop reason: SDUPTHER

## 2019-01-29 RX ORDER — PANTOPRAZOLE SODIUM 40 MG/1
40 TABLET, DELAYED RELEASE ORAL 2 TIMES DAILY
COMMUNITY
End: 2019-11-02 | Stop reason: SDUPTHER

## 2019-01-29 RX ORDER — POLYETHYLENE GLYCOL 3350 17 G/17G
17 POWDER, FOR SOLUTION ORAL DAILY
COMMUNITY

## 2019-01-29 RX ORDER — ALLOPURINOL 300 MG/1
300 TABLET ORAL DAILY
COMMUNITY
End: 2019-10-09 | Stop reason: SDUPTHER

## 2019-02-05 ENCOUNTER — HOSPITAL ENCOUNTER (OUTPATIENT)
Age: 79
Setting detail: OUTPATIENT SURGERY
Discharge: HOME | End: 2019-02-05
Attending: SPECIALIST | Admitting: SPECIALIST

## 2019-02-05 ENCOUNTER — ANESTHESIA (OUTPATIENT)
Dept: OPERATING ROOM | Age: 79
End: 2019-02-05

## 2019-02-05 ENCOUNTER — HOSPITAL ENCOUNTER (OUTPATIENT)
Dept: GENERAL RADIOLOGY | Age: 79
Discharge: HOME | End: 2019-02-05
Payer: MEDICARE

## 2019-02-05 VITALS
TEMPERATURE: 97.4 F | HEART RATE: 75 BPM | WEIGHT: 167 LBS | OXYGEN SATURATION: 99 % | BODY MASS INDEX: 21.43 KG/M2 | HEIGHT: 74 IN | SYSTOLIC BLOOD PRESSURE: 135 MMHG | DIASTOLIC BLOOD PRESSURE: 78 MMHG | RESPIRATION RATE: 18 BRPM

## 2019-02-05 VITALS — SYSTOLIC BLOOD PRESSURE: 112 MMHG | DIASTOLIC BLOOD PRESSURE: 59 MMHG | OXYGEN SATURATION: 97 %

## 2019-02-05 DIAGNOSIS — C85.90 LYMPHOMA, UNSPECIFIED BODY REGION, UNSPECIFIED LYMPHOMA TYPE (HCC): ICD-10-CM

## 2019-02-05 DIAGNOSIS — C85.90 LYMPHOMA, UNSPECIFIED BODY REGION, UNSPECIFIED LYMPHOMA TYPE (HCC): Primary | ICD-10-CM

## 2019-02-05 DIAGNOSIS — T81.40XS POSTOPERATIVE INFECTION, UNSPECIFIED TYPE, SEQUELA: ICD-10-CM

## 2019-02-05 PROCEDURE — G8916 PT W IV AB GIVEN ON TIME: HCPCS | Performed by: NURSE PRACTITIONER

## 2019-02-05 PROCEDURE — 36561 INSERT TUNNELED CV CATH: CPT

## 2019-02-05 PROCEDURE — C1788 PORT, INDWELLING, IMP: HCPCS | Performed by: SPECIALIST

## 2019-02-05 PROCEDURE — 3209999900 FLUORO FOR SURGICAL PROCEDURES

## 2019-02-05 PROCEDURE — 71045 X-RAY EXAM CHEST 1 VIEW: CPT

## 2019-02-05 PROCEDURE — G8907 PT DOC NO EVENTS ON DISCHARG: HCPCS | Performed by: NURSE PRACTITIONER

## 2019-02-05 DEVICE — PORT VASC ACCS SGL LUMN DETACHED SIL CATH 9.6 FR SMRT PRT: Type: IMPLANTABLE DEVICE | Site: CHEST | Status: FUNCTIONAL

## 2019-02-05 RX ORDER — LIDOCAINE HYDROCHLORIDE 10 MG/ML
INJECTION, SOLUTION INFILTRATION; PERINEURAL PRN
Status: DISCONTINUED | OUTPATIENT
Start: 2019-02-05 | End: 2019-02-05 | Stop reason: HOSPADM

## 2019-02-05 RX ORDER — PROPOFOL 10 MG/ML
INJECTION, EMULSION INTRAVENOUS PRN
Status: DISCONTINUED | OUTPATIENT
Start: 2019-02-05 | End: 2019-02-05 | Stop reason: SDUPTHER

## 2019-02-05 RX ORDER — SODIUM CHLORIDE, SODIUM LACTATE, POTASSIUM CHLORIDE, CALCIUM CHLORIDE 600; 310; 30; 20 MG/100ML; MG/100ML; MG/100ML; MG/100ML
INJECTION, SOLUTION INTRAVENOUS CONTINUOUS
Status: DISCONTINUED | OUTPATIENT
Start: 2019-02-05 | End: 2019-02-05 | Stop reason: HOSPADM

## 2019-02-05 RX ORDER — LIDOCAINE HYDROCHLORIDE 10 MG/ML
1 INJECTION, SOLUTION EPIDURAL; INFILTRATION; INTRACAUDAL; PERINEURAL
Status: DISCONTINUED | OUTPATIENT
Start: 2019-02-05 | End: 2019-02-05 | Stop reason: HOSPADM

## 2019-02-05 RX ORDER — MIDAZOLAM HYDROCHLORIDE 1 MG/ML
INJECTION INTRAMUSCULAR; INTRAVENOUS PRN
Status: DISCONTINUED | OUTPATIENT
Start: 2019-02-05 | End: 2019-02-05 | Stop reason: SDUPTHER

## 2019-02-05 RX ORDER — FENTANYL CITRATE 50 UG/ML
INJECTION, SOLUTION INTRAMUSCULAR; INTRAVENOUS PRN
Status: DISCONTINUED | OUTPATIENT
Start: 2019-02-05 | End: 2019-02-05 | Stop reason: SDUPTHER

## 2019-02-05 RX ADMIN — PROPOFOL 80 MG: 10 INJECTION, EMULSION INTRAVENOUS at 13:12

## 2019-02-05 RX ADMIN — MIDAZOLAM HYDROCHLORIDE 1 MG: 1 INJECTION INTRAMUSCULAR; INTRAVENOUS at 13:11

## 2019-02-05 RX ADMIN — SODIUM CHLORIDE, SODIUM LACTATE, POTASSIUM CHLORIDE, CALCIUM CHLORIDE: 600; 310; 30; 20 INJECTION, SOLUTION INTRAVENOUS at 12:05

## 2019-02-05 RX ADMIN — FENTANYL CITRATE 50 MCG: 50 INJECTION, SOLUTION INTRAMUSCULAR; INTRAVENOUS at 13:11

## 2019-03-04 ENCOUNTER — TRANSCRIBE ORDERS (OUTPATIENT)
Dept: ADMINISTRATIVE | Facility: HOSPITAL | Age: 79
End: 2019-03-04

## 2019-03-04 DIAGNOSIS — C83.39 DIFFUSE LARGE B-CELL LYMPHOMA, EXTRANODAL AND SOLID ORGAN SITES (HCC): Primary | ICD-10-CM

## 2019-03-11 ENCOUNTER — HOSPITAL ENCOUNTER (OUTPATIENT)
Dept: CT IMAGING | Facility: HOSPITAL | Age: 79
Discharge: HOME | End: 2019-03-11
Admitting: INTERNAL MEDICINE

## 2019-03-11 DIAGNOSIS — C83.39 DIFFUSE LARGE B-CELL LYMPHOMA, EXTRANODAL AND SOLID ORGAN SITES (HCC): ICD-10-CM

## 2019-03-11 LAB — CREAT BLDA-MCNC: 0.8 MG/DL (ref 0.6–1.3)

## 2019-03-11 PROCEDURE — 74177 CT ABD & PELVIS W/CONTRAST: CPT

## 2019-03-11 PROCEDURE — 25010000002 IOPAMIDOL 61 % SOLUTION: Performed by: INTERNAL MEDICINE

## 2019-03-11 PROCEDURE — 71260 CT THORAX DX C+: CPT

## 2019-03-11 PROCEDURE — 82565 ASSAY OF CREATININE: CPT

## 2019-03-11 PROCEDURE — 0 IOHEXOL 300 MG/ML SOLUTION: Performed by: INTERNAL MEDICINE

## 2019-03-11 RX ADMIN — IOHEXOL 50 ML: 300 INJECTION, SOLUTION INTRAVENOUS at 10:20

## 2019-03-11 RX ADMIN — IOPAMIDOL 100 ML: 612 INJECTION, SOLUTION INTRAVENOUS at 10:20

## 2019-03-27 RX ORDER — ONDANSETRON 8 MG/1
8 TABLET, ORALLY DISINTEGRATING ORAL EVERY 8 HOURS PRN
Refills: 2 | Status: ON HOLD | COMMUNITY
Start: 2019-02-11 | End: 2019-05-15

## 2019-03-27 RX ORDER — LENALIDOMIDE 15 MG/1
15 CAPSULE ORAL DAILY
Status: ON HOLD | COMMUNITY
End: 2019-05-15

## 2019-03-27 RX ORDER — MEGESTROL ACETATE 40 MG/ML
400 SUSPENSION ORAL DAILY
COMMUNITY
End: 2019-03-28

## 2019-03-27 RX ORDER — PANTOPRAZOLE SODIUM 40 MG/1
40 TABLET, DELAYED RELEASE ORAL 2 TIMES DAILY
COMMUNITY

## 2019-03-27 RX ORDER — ALLOPURINOL 300 MG/1
300 TABLET ORAL DAILY
Status: ON HOLD | COMMUNITY
End: 2019-05-15

## 2019-03-27 RX ORDER — ASPIRIN 81 MG/1
81 TABLET ORAL DAILY
Status: ON HOLD | COMMUNITY
End: 2019-05-15

## 2019-03-28 ENCOUNTER — TELEPHONE (OUTPATIENT)
Dept: CARDIOLOGY | Facility: CLINIC | Age: 79
End: 2019-03-28

## 2019-03-28 ENCOUNTER — OFFICE VISIT (OUTPATIENT)
Dept: CARDIOLOGY | Facility: CLINIC | Age: 79
End: 2019-03-28

## 2019-03-28 VITALS
HEIGHT: 74 IN | WEIGHT: 168 LBS | DIASTOLIC BLOOD PRESSURE: 70 MMHG | SYSTOLIC BLOOD PRESSURE: 110 MMHG | HEART RATE: 159 BPM | BODY MASS INDEX: 21.56 KG/M2

## 2019-03-28 DIAGNOSIS — I48.0 PAROXYSMAL ATRIAL FIBRILLATION (HCC): Primary | ICD-10-CM

## 2019-03-28 DIAGNOSIS — R06.02 SHORTNESS OF BREATH: ICD-10-CM

## 2019-03-28 PROCEDURE — 99204 OFFICE O/P NEW MOD 45 MIN: CPT | Performed by: INTERNAL MEDICINE

## 2019-03-28 PROCEDURE — 93000 ELECTROCARDIOGRAM COMPLETE: CPT | Performed by: INTERNAL MEDICINE

## 2019-03-28 RX ORDER — CHOLECALCIFEROL (VITAMIN D3) 125 MCG
5 CAPSULE ORAL NIGHTLY
COMMUNITY

## 2019-03-28 RX ORDER — POLYETHYLENE GLYCOL 3350 17 G/17G
17 POWDER, FOR SOLUTION ORAL DAILY PRN
COMMUNITY
End: 2019-08-15

## 2019-03-28 RX ORDER — LEVOFLOXACIN 750 MG/1
750 TABLET ORAL DAILY
Status: ON HOLD | COMMUNITY
End: 2019-05-15

## 2019-03-28 NOTE — PROGRESS NOTES
Subjective:     Encounter Date:03/28/2019      Patient ID: Aram Nunez is a 79 y.o. male with history of hypertension, hyperlipidemia, non-Hodgkin's lymphoma, currently undergoing treatment, referred here after the patient was found to have an irregular pulse.    Referring Provider: Lisa Tiwari MD  Reason for Referral: Irregular cardiac rhythm    Chief Complaint: Irregular pulse    Atrial Fibrillation   Presents for initial visit. Symptoms include shortness of breath. Symptoms are negative for chest pain, dizziness, hemodynamic instability and syncope. The symptoms have been stable. Past treatments include aspirin. Past medical history includes atrial fibrillation.     This is a 79-year-old male with a history of hypertension, hyperlipidemia, non-Hodgkin's lymphoma, currently undergoing treatment, including intermittent steroids he was found to have an irregular pulse at his oncology visit and was referred here for further evaluation.  Patient says that in general, with steroids, he has had increasing heart rates, as well as shortness of breath.  He says that from the time of initiation of chemotherapy, he has had persistent short of breath.  He denies orthopnea, PND.  He has had significant edema.  He denies lightheadedness, dizziness, syncope.  Up until today, the patient has had no falls but he did have a fall today.  This seems mechanical and he did not injure himself.  The patient has never been known to have any type of cardiac arrhythmia but was found to have atrial fibrillation on his EKG today in clinic.  He is not aware of this and does not endorse any palpitations, chest pain at this time.  The patient's blood pressure has been variable due to his chemotherapy.  He is currently not requiring any medications for this.  He says that he checks his blood pressure and heart rate mostly on a daily basis.  He says that during steroids his heart rate can get up over 100, but generally has been  somewhat regular but he has noted some irregular pulse.  However, he notes that as soon as steroids are complete, his heart rate comes down back in the 70s.    The following portions of the patient's history were reviewed and updated as appropriate: allergies, current medications, past family history, past medical history, past social history, past surgical history and problem list.     Past Medical History:   Diagnosis Date   • Arthritis    • Cancer (CMS/HCC)     skin   • Diabetes mellitus (CMS/HCC)     borderline, no medication   • Disease of thyroid gland    • Dysrhythmia    • HL (hearing loss)    • Hyperlipidemia    • Hypertension    • IBS (irritable bowel syndrome)    • Neuropathy    • Non Hodgkin's lymphoma (CMS/HCC)      Past Surgical History:   Procedure Laterality Date   • CARDIAC CATHETERIZATION     • PARATHYROID GLAND SURGERY     • PROSTATE SURGERY      PROSTATECTOMY   • SKIN CANCER EXCISION         Current Outpatient Medications:   •  allopurinol (ZYLOPRIM) 300 MG tablet, Take 300 mg by mouth Daily., Disp: , Rfl:   •  aspirin 81 MG EC tablet, Take 81 mg by mouth Daily., Disp: , Rfl:   •  HYDROcodone-acetaminophen (NORCO) 7.5-325 MG per tablet, Take 1 tablet by mouth Every 8 (Eight) Hours As Needed for Moderate Pain ., Disp: 10 tablet, Rfl: 0  •  lenalidomide (REVLIMID) 15 MG capsule, Take 15 mg by mouth Daily., Disp: , Rfl:   •  levoFLOXacin (LEVAQUIN) 750 MG tablet, Take 750 mg by mouth Daily., Disp: , Rfl:   •  loratadine (CLARITIN) 10 MG tablet, Take 10 mg by mouth Daily., Disp: , Rfl:   •  melatonin 5 MG tablet tablet, Take 5 mg by mouth Every Night., Disp: , Rfl:   •  metFORMIN (GLUCOPHAGE) 500 MG tablet, Take 1,000 mg by mouth 2 (Two) Times a Day With Meals., Disp: , Rfl:   •  ondansetron ODT (ZOFRAN-ODT) 8 MG disintegrating tablet, Take 8 mg by mouth Every 8 (Eight) Hours As Needed., Disp: , Rfl: 2  •  pantoprazole (PROTONIX) 40 MG EC tablet, Take 40 mg by mouth 2 (Two) Times a Day., Disp: , Rfl:    •  polyethylene glycol (MIRALAX) packet, Take 17 g by mouth As Needed., Disp: , Rfl:   •  Lidocaine-Prilocaine, Bulk, 2.5-2.5 % cream, Apply 1 application topically to the appropriate area as directed As Needed (APPLY TO AREA OF PORT 30 MIN- 1 HR PRIOR TO PORT ACCESS)., Disp: , Rfl:     No Known Allergies    Social History     Tobacco Use   • Smoking status: Never Smoker   • Smokeless tobacco: Never Used   Substance Use Topics   • Alcohol use: No     Family History   Problem Relation Age of Onset   • Heart disease Mother    • Cancer Father    • Heart disease Brother      Review of Systems   Constitution: Positive for malaise/fatigue. Negative for chills, fever, night sweats and weight loss.   HENT: Negative for congestion and hearing loss.    Eyes: Negative for blurred vision and pain.   Cardiovascular: Positive for dyspnea on exertion and irregular heartbeat. Negative for chest pain, claudication, leg swelling, orthopnea, paroxysmal nocturnal dyspnea and syncope.   Respiratory: Positive for shortness of breath. Negative for cough, hemoptysis and wheezing.    Endocrine: Negative for cold intolerance, heat intolerance, polydipsia and polyuria.   Hematologic/Lymphatic: Negative for adenopathy and bleeding problem. Does not bruise/bleed easily.   Skin: Negative for color change, poor wound healing and rash.   Musculoskeletal: Positive for falls (The patient fell today but has not fallen any other time). Negative for arthritis, back pain, joint pain, joint swelling, myalgias and neck pain.   Gastrointestinal: Negative for abdominal pain, change in bowel habit, constipation, diarrhea, heartburn, hematochezia, melena, nausea and vomiting.   Genitourinary: Negative for dysuria, frequency, hematuria and nocturia.   Neurological: Negative for dizziness, focal weakness, headaches, light-headedness, loss of balance and numbness.   Psychiatric/Behavioral: Negative for altered mental status, memory loss and substance abuse.    Allergic/Immunologic: Negative for hives and persistent infections.       ECG 12 Lead  Date/Time: 3/28/2019 11:20 AM  Performed by: Chris Meyer MD  Authorized by: Chris Meyer MD   Rhythm: atrial fibrillation  Rate: tachycardic  BPM: 159  QRS axis: normal  Other findings: non-specific ST-T wave changes    Clinical impression: abnormal EKG             Objective:     Physical Exam   Constitutional: He is oriented to person, place, and time. Vital signs are normal. He appears well-developed and well-nourished. He is cooperative.  Non-toxic appearance. No distress.   HENT:   Head: Normocephalic and atraumatic.   Right Ear: External ear normal.   Left Ear: External ear normal.   Nose: Nose normal.   Mouth/Throat: Uvula is midline, oropharynx is clear and moist and mucous membranes are normal. Mucous membranes are not pale, not dry and not cyanotic. No oropharyngeal exudate.   Eyes: EOM and lids are normal. Pupils are equal, round, and reactive to light.   Neck: Normal range of motion. Neck supple. No hepatojugular reflux and no JVD present. Carotid bruit is not present. No tracheal deviation and no edema present. No thyroid mass and no thyromegaly present.   Cardiovascular: S1 normal, S2 normal, normal heart sounds, intact distal pulses and normal pulses. An irregularly irregular rhythm present.  No extrasystoles are present. Tachycardia present. PMI is not displaced. Exam reveals no gallop and no friction rub.   No murmur heard.  Pulses:       Radial pulses are 2+ on the right side, and 2+ on the left side.        Femoral pulses are 2+ on the right side, and 2+ on the left side.       Dorsalis pedis pulses are 2+ on the right side, and 2+ on the left side.        Posterior tibial pulses are 2+ on the right side, and 2+ on the left side.   Pulmonary/Chest: Effort normal and breath sounds normal. No accessory muscle usage. No respiratory distress. He has no wheezes. He has no rales. He exhibits no  "tenderness.   Abdominal: Soft. Normal appearance and bowel sounds are normal. He exhibits no distension, no abdominal bruit and no pulsatile midline mass. There is no hepatosplenomegaly. There is no tenderness.   Musculoskeletal: Normal range of motion. He exhibits no edema, tenderness or deformity.   Lymphadenopathy:     He has no cervical adenopathy.   Neurological: He is oriented to person, place, and time. He has normal strength. No cranial nerve deficit.   Skin: Skin is warm, dry and intact. No rash noted. He is not diaphoretic. No cyanosis or erythema. Nails show no clubbing.   Psychiatric: He has a normal mood and affect. His speech is normal and behavior is normal. Thought content normal.   Vitals reviewed.    /70 (BP Location: Left arm, Patient Position: Sitting)   Pulse (!) 159   Ht 188 cm (74\")   Wt 76.2 kg (168 lb)   BMI 21.57 kg/m²     Data/Lab Review:     Echocardiogram 1/2019:    Left Ventricle Left ventricular systolic function is normal. Estimated EF appears to be in the range of 66 - 70%. Normal left ventricular cavity size noted. All left ventricular wall segments contract normally. Left ventricular wall thickness is consistent with mild concentric hypertrophy. Septal wall motion is normal. Left ventricular diastolic dysfunction is noted (grade I a w/high LAP) consistent with impaired relaxation.   Right Ventricle Normal right ventricular cavity size, wall thickness, systolic function and septal motion noted.   Left Atrium Normal left atrial volume noted. Left atrial cavity size is borderline dilated.   Right Atrium Normal right atrial size noted.   Aortic Valve The aortic valve is structurally normal. No aortic valve regurgitation is present. No aortic valve stenosis is present.   Mitral Valve The mitral valve is normal in structure. Trace mitral valve regurgitation is present. No significant mitral valve stenosis is present.   Tricuspid Valve The tricuspid valve is normal. No tricuspid " valve stenosis is present. Trace tricuspid valve regurgitation is present.   Pulmonic Valve The pulmonic valve is not well visualized. The pulmonic valve is grossly normal in structure.   Greater Vessels No dilation of the aortic root is present. No dilation of the sinuses of Valsalva is present.   Pericardium The pericardium is normal. There is no evidence of pericardial effusion.           Assessment:          Diagnosis Plan   1. Paroxysmal atrial fibrillation (CMS/HCC)  ECG 12 Lead   2. Shortness of breath            Plan:       1.  Paroxysmal atrial fibrillation: Patient was referred here due to irregular pulse, however I do not feel that at that particular time the patient was in atrial fibrillation, although it is impossible to tell.  He certainly is in atrial fibrillation today.  Initially, he was quite tachycardic on his EKG but after rest for quite some time, his heart rate was down near 100 bpm.  He is essentially asymptomatic at this time.  The patient thinks that his heart rate is being driven up by his steroids.  Ideally, given this patient's age and risk profile, he would be anticoagulated in the setting of atrial fibrillation, however, due to his ongoing chemotherapy, including Revlimid, the risk of anemia and thrombus cytopenia seems significant enough to consider this cautiously.  We did discuss anticoagulation in the setting of atrial for ablation however we all agree that this would likely be a risky endeavor for him at this particular time.  Given that his pulse has been regular and with a normal rate during episodes where he is not on steroids, I think that it is reasonable to manage this conservatively at this time.  I would like for him to return on Monday for an EKG.  His last dose of steroids will be tomorrow night.  By Monday, if the patient remains in atrial fibrillation, particularly if his heart rate remains elevated, we may consider adding rate controlling medications.  At this time,  the risk of anticoagulation seems somewhat high, however.  The patient will have labs checked at oncology's office early next week and I have asked him to send those labs to us for review.    2.  Shortness of breath: It is likely the patient shortness of breath is multifactorial.  His echocardiogram did not show any significant findings which would be thought to be significant contributors to his shortness of breath.  As with his heart rate, shortness of breath seems to wax and wane somewhat with his steroid administration.  No further cardiac workup at this particular time.    Patient's Body mass index is 21.57 kg/m². BMI is within normal parameters. No follow-up required..    Follow-up: We will need to follow this patient relatively closely.  We will get an EKG early next week to reevaluate his cardiac rhythm.  Based on this, we will determine what we need to see him back in office, however.

## 2019-03-28 NOTE — TELEPHONE ENCOUNTER
Patients wife said he could not get EKG at the doctor he went to today. Wants to come to heart center on Monday and have it done. Please order.

## 2019-03-29 PROBLEM — R06.02 SHORTNESS OF BREATH: Status: ACTIVE | Noted: 2019-03-29

## 2019-04-01 ENCOUNTER — HOSPITAL ENCOUNTER (OUTPATIENT)
Dept: CARDIOLOGY | Facility: HOSPITAL | Age: 79
Discharge: HOME | End: 2019-04-01
Admitting: INTERNAL MEDICINE

## 2019-04-01 DIAGNOSIS — I48.0 PAROXYSMAL ATRIAL FIBRILLATION (HCC): ICD-10-CM

## 2019-04-01 PROCEDURE — 93010 ELECTROCARDIOGRAM REPORT: CPT | Performed by: INTERNAL MEDICINE

## 2019-04-01 PROCEDURE — 93005 ELECTROCARDIOGRAM TRACING: CPT | Performed by: INTERNAL MEDICINE

## 2019-04-03 ENCOUNTER — TELEPHONE (OUTPATIENT)
Dept: CARDIOLOGY | Facility: CLINIC | Age: 79
End: 2019-04-03

## 2019-04-03 NOTE — TELEPHONE ENCOUNTER
Spoke with patients wife and asked how patients heart rate has been. She said they have been taking it at home and it has been lower than the 170 it was on the EKG he had 4/1/19 but she couldn't remember what it has been. Patient wants(per wife) to have another EKG next week before deciding if he will start taking medication for heart rate. Will ask Dr Meyer if he will put in order.

## 2019-04-05 DIAGNOSIS — I48.0 PAROXYSMAL ATRIAL FIBRILLATION (HCC): Primary | ICD-10-CM

## 2019-04-08 ENCOUNTER — HOSPITAL ENCOUNTER (OUTPATIENT)
Dept: CARDIOLOGY | Facility: HOSPITAL | Age: 79
Discharge: HOME | End: 2019-04-08
Admitting: INTERNAL MEDICINE

## 2019-04-08 DIAGNOSIS — I48.0 PAROXYSMAL ATRIAL FIBRILLATION (HCC): ICD-10-CM

## 2019-04-08 PROCEDURE — 93005 ELECTROCARDIOGRAM TRACING: CPT | Performed by: INTERNAL MEDICINE

## 2019-04-08 PROCEDURE — 93010 ELECTROCARDIOGRAM REPORT: CPT | Performed by: INTERNAL MEDICINE

## 2019-04-29 ENCOUNTER — LAB (OUTPATIENT)
Dept: LAB | Facility: HOSPITAL | Age: 79
End: 2019-04-29

## 2019-04-29 ENCOUNTER — INFUSION (OUTPATIENT)
Dept: ONCOLOGY | Facility: HOSPITAL | Age: 79
End: 2019-04-29

## 2019-04-29 VITALS
RESPIRATION RATE: 18 BRPM | HEART RATE: 86 BPM | OXYGEN SATURATION: 96 % | TEMPERATURE: 98 F | SYSTOLIC BLOOD PRESSURE: 116 MMHG | WEIGHT: 163 LBS | HEIGHT: 74 IN | DIASTOLIC BLOOD PRESSURE: 60 MMHG | BODY MASS INDEX: 20.92 KG/M2

## 2019-04-29 DIAGNOSIS — D64.9 ANEMIA, UNSPECIFIED TYPE: ICD-10-CM

## 2019-04-29 DIAGNOSIS — D64.9 ANEMIA, UNSPECIFIED TYPE: Primary | ICD-10-CM

## 2019-04-29 LAB
ABO GROUP BLD: NORMAL
BLD GP AB SCN SERPL QL: NEGATIVE
RH BLD: NEGATIVE
T&S EXPIRATION DATE: NORMAL

## 2019-04-29 PROCEDURE — 86920 COMPATIBILITY TEST SPIN: CPT

## 2019-04-29 PROCEDURE — 25010000002 FUROSEMIDE PER 20 MG: Performed by: INTERNAL MEDICINE

## 2019-04-29 PROCEDURE — P9016 RBC LEUKOCYTES REDUCED: HCPCS

## 2019-04-29 PROCEDURE — 86850 RBC ANTIBODY SCREEN: CPT

## 2019-04-29 PROCEDURE — 86900 BLOOD TYPING SEROLOGIC ABO: CPT

## 2019-04-29 PROCEDURE — 36430 TRANSFUSION BLD/BLD COMPNT: CPT

## 2019-04-29 PROCEDURE — 86901 BLOOD TYPING SEROLOGIC RH(D): CPT

## 2019-04-29 RX ORDER — SODIUM CHLORIDE 0.9 % (FLUSH) 0.9 %
10 SYRINGE (ML) INJECTION AS NEEDED
Status: DISCONTINUED | OUTPATIENT
Start: 2019-04-29 | End: 2019-04-29 | Stop reason: HOSPADM

## 2019-04-29 RX ORDER — HEPARIN SODIUM (PORCINE) LOCK FLUSH IV SOLN 100 UNIT/ML 100 UNIT/ML
500 SOLUTION INTRAVENOUS AS NEEDED
OUTPATIENT
Start: 2019-04-29

## 2019-04-29 RX ORDER — SODIUM CHLORIDE 0.9 % (FLUSH) 0.9 %
10 SYRINGE (ML) INJECTION AS NEEDED
OUTPATIENT
Start: 2019-04-29

## 2019-04-29 RX ORDER — SODIUM CHLORIDE 9 MG/ML
250 INJECTION, SOLUTION INTRAVENOUS AS NEEDED
Status: DISCONTINUED | OUTPATIENT
Start: 2019-04-29 | End: 2019-04-29 | Stop reason: HOSPADM

## 2019-04-29 RX ORDER — FUROSEMIDE 10 MG/ML
20 INJECTION INTRAMUSCULAR; INTRAVENOUS ONCE AS NEEDED
Status: DISCONTINUED | OUTPATIENT
Start: 2019-04-29 | End: 2019-04-29 | Stop reason: HOSPADM

## 2019-04-29 RX ADMIN — SODIUM CHLORIDE 250 ML: 9 INJECTION, SOLUTION INTRAVENOUS at 12:36

## 2019-04-29 RX ADMIN — Medication 500 UNITS: at 16:12

## 2019-04-29 RX ADMIN — SODIUM CHLORIDE, PRESERVATIVE FREE 10 ML: 5 INJECTION INTRAVENOUS at 16:12

## 2019-04-29 NOTE — PROGRESS NOTES
[id number] Patient has never had blood transfusion before, gave teaching sheet and discussed prior to consent being signed.Xavier BAEZA

## 2019-04-30 LAB
ABO + RH BLD: NORMAL
BH BB BLOOD EXPIRATION DATE: NORMAL
BH BB BLOOD TYPE BARCODE: 600
BH BB DISPENSE STATUS: NORMAL
BH BB PRODUCT CODE: NORMAL
BH BB UNIT NUMBER: NORMAL
CROSSMATCH INTERPRETATION: NORMAL
UNIT  ABO: NORMAL
UNIT  RH: NORMAL

## 2019-05-13 ENCOUNTER — TRANSCRIBE ORDERS (OUTPATIENT)
Dept: ADMINISTRATIVE | Facility: HOSPITAL | Age: 79
End: 2019-05-13

## 2019-05-13 DIAGNOSIS — R06.02 SHORTNESS OF BREATH: Primary | ICD-10-CM

## 2019-05-13 DIAGNOSIS — C83.89: ICD-10-CM

## 2019-05-14 PROCEDURE — 99284 EMERGENCY DEPT VISIT MOD MDM: CPT

## 2019-05-15 ENCOUNTER — APPOINTMENT (OUTPATIENT)
Dept: GENERAL RADIOLOGY | Facility: HOSPITAL | Age: 79
End: 2019-05-15

## 2019-05-15 ENCOUNTER — APPOINTMENT (OUTPATIENT)
Dept: CARDIOLOGY | Facility: HOSPITAL | Age: 79
End: 2019-05-15

## 2019-05-15 ENCOUNTER — APPOINTMENT (OUTPATIENT)
Dept: CT IMAGING | Facility: HOSPITAL | Age: 79
End: 2019-05-15

## 2019-05-15 ENCOUNTER — HOSPITAL ENCOUNTER (INPATIENT)
Facility: HOSPITAL | Age: 79
LOS: 5 days | Discharge: HOME | End: 2019-05-20
Attending: EMERGENCY MEDICINE | Admitting: FAMILY MEDICINE

## 2019-05-15 DIAGNOSIS — I48.0 PAROXYSMAL ATRIAL FIBRILLATION WITH RAPID VENTRICULAR RESPONSE (HCC): ICD-10-CM

## 2019-05-15 DIAGNOSIS — I48.91 ATRIAL FIBRILLATION, UNSPECIFIED TYPE (HCC): ICD-10-CM

## 2019-05-15 DIAGNOSIS — Z74.09 IMPAIRED FUNCTIONAL MOBILITY, BALANCE, GAIT, AND ENDURANCE: ICD-10-CM

## 2019-05-15 DIAGNOSIS — E87.70 HYPERVOLEMIA, UNSPECIFIED HYPERVOLEMIA TYPE: ICD-10-CM

## 2019-05-15 DIAGNOSIS — R77.8 ELEVATED TROPONIN: ICD-10-CM

## 2019-05-15 DIAGNOSIS — I50.9 ACUTE CONGESTIVE HEART FAILURE, UNSPECIFIED HEART FAILURE TYPE (HCC): Primary | ICD-10-CM

## 2019-05-15 PROBLEM — J81.0 ACUTE PULMONARY EDEMA (HCC): Status: ACTIVE | Noted: 2019-05-15

## 2019-05-15 PROBLEM — R60.0 LOWER EXTREMITY EDEMA: Status: ACTIVE | Noted: 2019-05-15

## 2019-05-15 PROBLEM — C83.30 DIFFUSE LARGE B CELL LYMPHOMA (HCC): Status: ACTIVE | Noted: 2019-05-15

## 2019-05-15 PROBLEM — J90 PLEURAL EFFUSION, BILATERAL: Status: ACTIVE | Noted: 2019-05-15

## 2019-05-15 LAB
ALBUMIN SERPL-MCNC: 3.6 G/DL (ref 3.5–5)
ALBUMIN/GLOB SERPL: 1.7 G/DL (ref 1.1–2.5)
ALP SERPL-CCNC: 68 U/L (ref 24–120)
ALT SERPL W P-5'-P-CCNC: 33 U/L (ref 0–54)
ANION GAP SERPL CALCULATED.3IONS-SCNC: 12 MMOL/L (ref 4–13)
APTT PPP: 28.7 SECONDS (ref 24.1–35)
AST SERPL-CCNC: 43 U/L (ref 7–45)
BASOPHILS # BLD AUTO: 0.04 10*3/MM3 (ref 0–0.2)
BASOPHILS NFR BLD AUTO: 0.9 % (ref 0–2)
BH CV ECHO MEAS - AO MAX PG (FULL): 1.4 MMHG
BH CV ECHO MEAS - AO MAX PG: 2.7 MMHG
BH CV ECHO MEAS - AO MEAN PG (FULL): 0 MMHG
BH CV ECHO MEAS - AO MEAN PG: 1 MMHG
BH CV ECHO MEAS - AO ROOT AREA (BSA CORRECTED): 1.6
BH CV ECHO MEAS - AO ROOT AREA: 8.6 CM^2
BH CV ECHO MEAS - AO ROOT DIAM: 3.3 CM
BH CV ECHO MEAS - AO V2 MAX: 82.5 CM/SEC
BH CV ECHO MEAS - AO V2 MEAN: 49.6 CM/SEC
BH CV ECHO MEAS - AO V2 VTI: 12.5 CM
BH CV ECHO MEAS - AVA(I,A): 3.3 CM^2
BH CV ECHO MEAS - AVA(I,D): 3.3 CM^2
BH CV ECHO MEAS - AVA(V,A): 2.7 CM^2
BH CV ECHO MEAS - AVA(V,D): 2.7 CM^2
BH CV ECHO MEAS - BSA(HAYCOCK): 2 M^2
BH CV ECHO MEAS - BSA: 2 M^2
BH CV ECHO MEAS - BZI_BMI: 21.4 KILOGRAMS/M^2
BH CV ECHO MEAS - BZI_METRIC_HEIGHT: 188 CM
BH CV ECHO MEAS - BZI_METRIC_WEIGHT: 75.8 KG
BH CV ECHO MEAS - EDV(CUBED): 157.5 ML
BH CV ECHO MEAS - EDV(MOD-SP4): 112 ML
BH CV ECHO MEAS - EDV(TEICH): 141.3 ML
BH CV ECHO MEAS - EF(CUBED): 37 %
BH CV ECHO MEAS - EF(MOD-SP4): 36.2 %
BH CV ECHO MEAS - EF(TEICH): 30.1 %
BH CV ECHO MEAS - ESV(CUBED): 99.3 ML
BH CV ECHO MEAS - ESV(MOD-SP4): 71.5 ML
BH CV ECHO MEAS - ESV(TEICH): 98.8 ML
BH CV ECHO MEAS - FS: 14.3 %
BH CV ECHO MEAS - IVS/LVPW: 1.4
BH CV ECHO MEAS - IVSD: 0.87 CM
BH CV ECHO MEAS - LA DIMENSION: 3.7 CM
BH CV ECHO MEAS - LA/AO: 1.1
BH CV ECHO MEAS - LAT PEAK E' VEL: 7.1 CM/SEC
BH CV ECHO MEAS - LV DIASTOLIC VOL/BSA (35-75): 55.6 ML/M^2
BH CV ECHO MEAS - LV MASS(C)D: 140.3 GRAMS
BH CV ECHO MEAS - LV MASS(C)DI: 69.7 GRAMS/M^2
BH CV ECHO MEAS - LV MAX PG: 1.3 MMHG
BH CV ECHO MEAS - LV MEAN PG: 1 MMHG
BH CV ECHO MEAS - LV SYSTOLIC VOL/BSA (12-30): 35.5 ML/M^2
BH CV ECHO MEAS - LV V1 MAX: 57.9 CM/SEC
BH CV ECHO MEAS - LV V1 MEAN: 37.4 CM/SEC
BH CV ECHO MEAS - LV V1 VTI: 10.9 CM
BH CV ECHO MEAS - LVIDD: 5.4 CM
BH CV ECHO MEAS - LVIDS: 4.6 CM
BH CV ECHO MEAS - LVLD AP4: 7.9 CM
BH CV ECHO MEAS - LVLS AP4: 8 CM
BH CV ECHO MEAS - LVOT AREA (M): 3.8 CM^2
BH CV ECHO MEAS - LVOT AREA: 3.8 CM^2
BH CV ECHO MEAS - LVOT DIAM: 2.2 CM
BH CV ECHO MEAS - LVPWD: 0.61 CM
BH CV ECHO MEAS - MED PEAK E' VEL: 7.7 CM/SEC
BH CV ECHO MEAS - MV A MAX VEL: 98.1 CM/SEC
BH CV ECHO MEAS - MV DEC TIME: 0.11 SEC
BH CV ECHO MEAS - MV E MAX VEL: 96.8 CM/SEC
BH CV ECHO MEAS - MV E/A: 0.99
BH CV ECHO MEAS - PI END-D VEL: 128 CM/SEC
BH CV ECHO MEAS - SI(AO): 53 ML/M^2
BH CV ECHO MEAS - SI(CUBED): 28.9 ML/M^2
BH CV ECHO MEAS - SI(LVOT): 20.6 ML/M^2
BH CV ECHO MEAS - SI(MOD-SP4): 20.1 ML/M^2
BH CV ECHO MEAS - SI(TEICH): 21.1 ML/M^2
BH CV ECHO MEAS - SV(AO): 106.7 ML
BH CV ECHO MEAS - SV(CUBED): 58.2 ML
BH CV ECHO MEAS - SV(LVOT): 41.4 ML
BH CV ECHO MEAS - SV(MOD-SP4): 40.5 ML
BH CV ECHO MEAS - SV(TEICH): 42.5 ML
BH CV ECHO MEAS - TR MAX VEL: 243 CM/SEC
BH CV ECHO MEASUREMENTS AVERAGE E/E' RATIO: 13.08
BILIRUB SERPL-MCNC: 0.5 MG/DL (ref 0.1–1)
BILIRUB UR QL STRIP: NEGATIVE
BUN BLD-MCNC: 27 MG/DL (ref 5–21)
BUN/CREAT SERPL: 35.5 (ref 7–25)
CALCIUM SPEC-SCNC: 9.3 MG/DL (ref 8.4–10.4)
CHLORIDE SERPL-SCNC: 98 MMOL/L (ref 98–110)
CLARITY UR: CLEAR
CO2 SERPL-SCNC: 22 MMOL/L (ref 24–31)
COLOR UR: YELLOW
CREAT BLD-MCNC: 0.76 MG/DL (ref 0.5–1.4)
DEPRECATED RDW RBC AUTO: 66 FL (ref 40–54)
EOSINOPHIL # BLD AUTO: 0.03 10*3/MM3 (ref 0–0.7)
EOSINOPHIL NFR BLD AUTO: 0.7 % (ref 0–4)
ERYTHROCYTE [DISTWIDTH] IN BLOOD BY AUTOMATED COUNT: 19.2 % (ref 12–15)
GFR SERPL CREATININE-BSD FRML MDRD: 99 ML/MIN/1.73
GLOBULIN UR ELPH-MCNC: 2.1 GM/DL
GLUCOSE BLD-MCNC: 98 MG/DL (ref 70–100)
GLUCOSE UR STRIP-MCNC: NEGATIVE MG/DL
HCT VFR BLD AUTO: 30.1 % (ref 40–52)
HGB BLD-MCNC: 10.3 G/DL (ref 14–18)
HGB UR QL STRIP.AUTO: NEGATIVE
INR PPP: 1.08 (ref 0.91–1.09)
KETONES UR QL STRIP: NEGATIVE
LEFT ATRIUM VOLUME INDEX: 53.1 ML/M2
LEFT ATRIUM VOLUME: 26.4 CM3
LEUKOCYTE ESTERASE UR QL STRIP.AUTO: NEGATIVE
LYMPHOCYTES # BLD AUTO: 0.68 10*3/MM3 (ref 0.72–4.86)
LYMPHOCYTES NFR BLD AUTO: 15.6 % (ref 15–45)
MAXIMAL PREDICTED HEART RATE: 141 BPM
MCH RBC QN AUTO: 33.4 PG (ref 28–32)
MCHC RBC AUTO-ENTMCNC: 34.2 G/DL (ref 33–36)
MCV RBC AUTO: 97.7 FL (ref 82–95)
MONOCYTES # BLD AUTO: 1.06 10*3/MM3 (ref 0.19–1.3)
MONOCYTES NFR BLD AUTO: 24.3 % (ref 4–12)
NEUTROPHILS # BLD AUTO: 2.52 10*3/MM3 (ref 1.87–8.4)
NEUTROPHILS NFR BLD AUTO: 57.8 % (ref 39–78)
NITRITE UR QL STRIP: NEGATIVE
NT-PROBNP SERPL-MCNC: 9170 PG/ML (ref 0–1800)
PH UR STRIP.AUTO: 6.5 [PH] (ref 5–8)
PLATELET # BLD AUTO: 115 10*3/MM3 (ref 130–400)
PMV BLD AUTO: 12.8 FL (ref 6–12)
POTASSIUM BLD-SCNC: 4.9 MMOL/L (ref 3.5–5.3)
PROT SERPL-MCNC: 5.7 G/DL (ref 6.3–8.7)
PROT UR QL STRIP: NEGATIVE
PROTHROMBIN TIME: 14.4 SECONDS (ref 11.9–14.6)
RBC # BLD AUTO: 3.08 10*6/MM3 (ref 4.8–5.9)
SODIUM BLD-SCNC: 132 MMOL/L (ref 135–145)
SP GR UR STRIP: 1.02 (ref 1–1.03)
STRESS TARGET HR: 120 BPM
TROPONIN I SERPL-MCNC: 0.24 NG/ML (ref 0–0.03)
TROPONIN I SERPL-MCNC: 0.25 NG/ML (ref 0–0.03)
TSH SERPL DL<=0.05 MIU/L-ACNC: 1.65 MIU/ML (ref 0.47–4.68)
UROBILINOGEN UR QL STRIP: NORMAL
WBC NRBC COR # BLD: 4.36 10*3/MM3 (ref 4.8–10.8)

## 2019-05-15 PROCEDURE — 25010000002 ENOXAPARIN PER 10 MG: Performed by: FAMILY MEDICINE

## 2019-05-15 PROCEDURE — 85025 COMPLETE CBC W/AUTO DIFF WBC: CPT | Performed by: PHYSICIAN ASSISTANT

## 2019-05-15 PROCEDURE — 93005 ELECTROCARDIOGRAM TRACING: CPT | Performed by: EMERGENCY MEDICINE

## 2019-05-15 PROCEDURE — 84443 ASSAY THYROID STIM HORMONE: CPT | Performed by: NURSE PRACTITIONER

## 2019-05-15 PROCEDURE — 93306 TTE W/DOPPLER COMPLETE: CPT

## 2019-05-15 PROCEDURE — 0 IOPAMIDOL PER 1 ML: Performed by: EMERGENCY MEDICINE

## 2019-05-15 PROCEDURE — 25010000002 PERFLUTREN 6.52 MG/ML SUSPENSION: Performed by: FAMILY MEDICINE

## 2019-05-15 PROCEDURE — 25010000002 FUROSEMIDE PER 20 MG: Performed by: NURSE PRACTITIONER

## 2019-05-15 PROCEDURE — 93010 ELECTROCARDIOGRAM REPORT: CPT | Performed by: INTERNAL MEDICINE

## 2019-05-15 PROCEDURE — 84484 ASSAY OF TROPONIN QUANT: CPT | Performed by: EMERGENCY MEDICINE

## 2019-05-15 PROCEDURE — 25010000002 FUROSEMIDE PER 20 MG: Performed by: FAMILY MEDICINE

## 2019-05-15 PROCEDURE — 81003 URINALYSIS AUTO W/O SCOPE: CPT | Performed by: PHYSICIAN ASSISTANT

## 2019-05-15 PROCEDURE — 99222 1ST HOSP IP/OBS MODERATE 55: CPT | Performed by: INTERNAL MEDICINE

## 2019-05-15 PROCEDURE — 94760 N-INVAS EAR/PLS OXIMETRY 1: CPT

## 2019-05-15 PROCEDURE — 94799 UNLISTED PULMONARY SVC/PX: CPT

## 2019-05-15 PROCEDURE — 71045 X-RAY EXAM CHEST 1 VIEW: CPT

## 2019-05-15 PROCEDURE — 93306 TTE W/DOPPLER COMPLETE: CPT | Performed by: INTERNAL MEDICINE

## 2019-05-15 PROCEDURE — 84484 ASSAY OF TROPONIN QUANT: CPT | Performed by: PHYSICIAN ASSISTANT

## 2019-05-15 PROCEDURE — 71275 CT ANGIOGRAPHY CHEST: CPT

## 2019-05-15 PROCEDURE — 80053 COMPREHEN METABOLIC PANEL: CPT | Performed by: PHYSICIAN ASSISTANT

## 2019-05-15 PROCEDURE — 25010000002 DIGOXIN PER 500 MCG: Performed by: NURSE PRACTITIONER

## 2019-05-15 PROCEDURE — 85610 PROTHROMBIN TIME: CPT | Performed by: PHYSICIAN ASSISTANT

## 2019-05-15 PROCEDURE — 85730 THROMBOPLASTIN TIME PARTIAL: CPT | Performed by: PHYSICIAN ASSISTANT

## 2019-05-15 PROCEDURE — 25010000002 FUROSEMIDE PER 20 MG: Performed by: EMERGENCY MEDICINE

## 2019-05-15 PROCEDURE — 83880 ASSAY OF NATRIURETIC PEPTIDE: CPT | Performed by: PHYSICIAN ASSISTANT

## 2019-05-15 RX ORDER — ASPIRIN 81 MG/1
324 TABLET, CHEWABLE ORAL ONCE
Status: DISCONTINUED | OUTPATIENT
Start: 2019-05-15 | End: 2019-05-15

## 2019-05-15 RX ORDER — MEGESTROL ACETATE 40 MG/ML
400 SUSPENSION ORAL DAILY
Status: DISCONTINUED | OUTPATIENT
Start: 2019-05-15 | End: 2019-05-20 | Stop reason: HOSPADM

## 2019-05-15 RX ORDER — NITROGLYCERIN 0.4 MG/1
0.4 TABLET SUBLINGUAL
Status: DISCONTINUED | OUTPATIENT
Start: 2019-05-15 | End: 2019-05-20 | Stop reason: HOSPADM

## 2019-05-15 RX ORDER — POTASSIUM CHLORIDE 750 MG/1
20 CAPSULE, EXTENDED RELEASE ORAL DAILY
Status: DISCONTINUED | OUTPATIENT
Start: 2019-05-15 | End: 2019-05-20 | Stop reason: HOSPADM

## 2019-05-15 RX ORDER — FUROSEMIDE 10 MG/ML
20 INJECTION INTRAMUSCULAR; INTRAVENOUS EVERY 12 HOURS
Status: DISCONTINUED | OUTPATIENT
Start: 2019-05-15 | End: 2019-05-15

## 2019-05-15 RX ORDER — SODIUM CHLORIDE 0.9 % (FLUSH) 0.9 %
3-10 SYRINGE (ML) INJECTION AS NEEDED
Status: DISCONTINUED | OUTPATIENT
Start: 2019-05-15 | End: 2019-05-20 | Stop reason: HOSPADM

## 2019-05-15 RX ORDER — HYDROCODONE BITARTRATE AND ACETAMINOPHEN 7.5; 325 MG/1; MG/1
1 TABLET ORAL EVERY 8 HOURS PRN
Status: DISCONTINUED | OUTPATIENT
Start: 2019-05-15 | End: 2019-05-20 | Stop reason: HOSPADM

## 2019-05-15 RX ORDER — POTASSIUM CHLORIDE 1.5 G/1.77G
20 POWDER, FOR SOLUTION ORAL 2 TIMES DAILY
Status: ON HOLD | COMMUNITY
End: 2019-05-15

## 2019-05-15 RX ORDER — MEGESTROL ACETATE 125 MG/ML
400 SUSPENSION ORAL DAILY
Status: ON HOLD | COMMUNITY
End: 2019-05-15

## 2019-05-15 RX ORDER — POTASSIUM CHLORIDE 1.5 G/1.77G
20 POWDER, FOR SOLUTION ORAL DAILY
Status: DISCONTINUED | OUTPATIENT
Start: 2019-05-15 | End: 2019-05-15

## 2019-05-15 RX ORDER — FUROSEMIDE 10 MG/ML
40 INJECTION INTRAMUSCULAR; INTRAVENOUS EVERY 12 HOURS
Status: DISCONTINUED | OUTPATIENT
Start: 2019-05-15 | End: 2019-05-16

## 2019-05-15 RX ORDER — FUROSEMIDE 10 MG/ML
60 INJECTION INTRAMUSCULAR; INTRAVENOUS ONCE
Status: COMPLETED | OUTPATIENT
Start: 2019-05-15 | End: 2019-05-15

## 2019-05-15 RX ORDER — VITAMIN B COMPLEX
1 CAPSULE ORAL DAILY
COMMUNITY

## 2019-05-15 RX ORDER — MEGESTROL ACETATE 40 MG/ML
400 SUSPENSION ORAL DAILY
COMMUNITY

## 2019-05-15 RX ORDER — GABAPENTIN 100 MG
100 CAPSULE ORAL 3 TIMES DAILY
COMMUNITY
End: 2019-06-13

## 2019-05-15 RX ORDER — DILTIAZEM HYDROCHLORIDE 120 MG/1
120 CAPSULE, COATED, EXTENDED RELEASE ORAL ONCE
Status: DISCONTINUED | OUTPATIENT
Start: 2019-05-15 | End: 2019-05-15

## 2019-05-15 RX ORDER — POTASSIUM CHLORIDE 20 MEQ/1
20 TABLET, EXTENDED RELEASE ORAL DAILY
COMMUNITY
Start: 2019-05-14 | End: 2019-07-16 | Stop reason: SDUPTHER

## 2019-05-15 RX ORDER — ACETAMINOPHEN 325 MG/1
650 TABLET ORAL EVERY 4 HOURS PRN
Status: DISCONTINUED | OUTPATIENT
Start: 2019-05-15 | End: 2019-05-20 | Stop reason: HOSPADM

## 2019-05-15 RX ORDER — ASPIRIN 81 MG/1
324 TABLET, CHEWABLE ORAL ONCE
Status: COMPLETED | OUTPATIENT
Start: 2019-05-15 | End: 2019-05-15

## 2019-05-15 RX ORDER — DIGOXIN 0.25 MG/ML
250 INJECTION INTRAMUSCULAR; INTRAVENOUS EVERY 6 HOURS
Status: COMPLETED | OUTPATIENT
Start: 2019-05-15 | End: 2019-05-15

## 2019-05-15 RX ORDER — POLYETHYLENE GLYCOL 3350 17 G/17G
17 POWDER, FOR SOLUTION ORAL DAILY PRN
Status: DISCONTINUED | OUTPATIENT
Start: 2019-05-15 | End: 2019-05-20 | Stop reason: HOSPADM

## 2019-05-15 RX ORDER — LANOLIN ALCOHOL/MO/W.PET/CERES
5 CREAM (GRAM) TOPICAL NIGHTLY
Status: DISCONTINUED | OUTPATIENT
Start: 2019-05-15 | End: 2019-05-20 | Stop reason: HOSPADM

## 2019-05-15 RX ORDER — PANTOPRAZOLE SODIUM 40 MG/1
40 TABLET, DELAYED RELEASE ORAL 2 TIMES DAILY
Status: DISCONTINUED | OUTPATIENT
Start: 2019-05-15 | End: 2019-05-20 | Stop reason: HOSPADM

## 2019-05-15 RX ORDER — SODIUM CHLORIDE 0.9 % (FLUSH) 0.9 %
3 SYRINGE (ML) INJECTION EVERY 12 HOURS SCHEDULED
Status: DISCONTINUED | OUTPATIENT
Start: 2019-05-15 | End: 2019-05-20 | Stop reason: HOSPADM

## 2019-05-15 RX ORDER — DIGOXIN 125 MCG
125 TABLET ORAL
Status: DISCONTINUED | OUTPATIENT
Start: 2019-05-16 | End: 2019-05-20 | Stop reason: HOSPADM

## 2019-05-15 RX ORDER — DILTIAZEM HYDROCHLORIDE 5 MG/ML
20 INJECTION INTRAVENOUS ONCE
Status: COMPLETED | OUTPATIENT
Start: 2019-05-15 | End: 2019-05-15

## 2019-05-15 RX ORDER — DILTIAZEM HYDROCHLORIDE 5 MG/ML
INJECTION INTRAVENOUS
Status: COMPLETED
Start: 2019-05-15 | End: 2019-05-15

## 2019-05-15 RX ORDER — FUROSEMIDE 20 MG/1
20 TABLET ORAL DAILY
COMMUNITY
Start: 2019-05-14 | End: 2019-06-10 | Stop reason: SDUPTHER

## 2019-05-15 RX ORDER — FINASTERIDE 5 MG/1
5 TABLET, FILM COATED ORAL DAILY
COMMUNITY

## 2019-05-15 RX ORDER — ONDANSETRON 2 MG/ML
4 INJECTION INTRAMUSCULAR; INTRAVENOUS EVERY 6 HOURS PRN
Status: DISCONTINUED | OUTPATIENT
Start: 2019-05-15 | End: 2019-05-20 | Stop reason: HOSPADM

## 2019-05-15 RX ORDER — GABAPENTIN 100 MG/1
100 CAPSULE ORAL 3 TIMES DAILY
Status: DISCONTINUED | OUTPATIENT
Start: 2019-05-15 | End: 2019-05-20 | Stop reason: HOSPADM

## 2019-05-15 RX ADMIN — IOPAMIDOL 100 ML: 755 INJECTION, SOLUTION INTRAVENOUS at 03:03

## 2019-05-15 RX ADMIN — DILTIAZEM HYDROCHLORIDE 20 MG: 5 INJECTION INTRAVENOUS at 00:57

## 2019-05-15 RX ADMIN — DILTIAZEM HYDROCHLORIDE 5 MG/HR: 5 INJECTION INTRAVENOUS at 04:44

## 2019-05-15 RX ADMIN — DILTIAZEM HYDROCHLORIDE 10 MG/HR: 5 INJECTION INTRAVENOUS at 14:58

## 2019-05-15 RX ADMIN — PANTOPRAZOLE SODIUM 40 MG: 40 TABLET, DELAYED RELEASE ORAL at 08:41

## 2019-05-15 RX ADMIN — APIXABAN 5 MG: 5 TABLET, FILM COATED ORAL at 22:14

## 2019-05-15 RX ADMIN — Medication 3 MG: at 22:13

## 2019-05-15 RX ADMIN — GABAPENTIN 100 MG: 100 CAPSULE ORAL at 15:01

## 2019-05-15 RX ADMIN — DIGOXIN 250 MCG: 250 INJECTION, SOLUTION INTRAMUSCULAR; INTRAVENOUS; PARENTERAL at 10:30

## 2019-05-15 RX ADMIN — DILTIAZEM HYDROCHLORIDE 20 MG: 5 INJECTION INTRAVENOUS at 01:28

## 2019-05-15 RX ADMIN — POTASSIUM CHLORIDE 20 MEQ: 750 CAPSULE, EXTENDED RELEASE ORAL at 08:41

## 2019-05-15 RX ADMIN — PERFLUTREN: 6.52 INJECTION, SUSPENSION INTRAVENOUS at 12:10

## 2019-05-15 RX ADMIN — ENOXAPARIN SODIUM 40 MG: 40 INJECTION SUBCUTANEOUS at 08:42

## 2019-05-15 RX ADMIN — GABAPENTIN 100 MG: 100 CAPSULE ORAL at 22:14

## 2019-05-15 RX ADMIN — DIGOXIN 250 MCG: 250 INJECTION, SOLUTION INTRAMUSCULAR; INTRAVENOUS; PARENTERAL at 22:48

## 2019-05-15 RX ADMIN — DIGOXIN 250 MCG: 250 INJECTION, SOLUTION INTRAMUSCULAR; INTRAVENOUS; PARENTERAL at 17:33

## 2019-05-15 RX ADMIN — GABAPENTIN 100 MG: 100 CAPSULE ORAL at 08:41

## 2019-05-15 RX ADMIN — FUROSEMIDE 40 MG: 10 INJECTION, SOLUTION INTRAVENOUS at 17:33

## 2019-05-15 RX ADMIN — FUROSEMIDE 20 MG: 10 INJECTION, SOLUTION INTRAMUSCULAR; INTRAVENOUS at 08:42

## 2019-05-15 RX ADMIN — MEGESTROL ACETATE 400 MG: 40 SUSPENSION ORAL at 10:30

## 2019-05-15 RX ADMIN — FUROSEMIDE 60 MG: 10 INJECTION, SOLUTION INTRAVENOUS at 04:35

## 2019-05-15 RX ADMIN — SODIUM CHLORIDE, PRESERVATIVE FREE 3 ML: 5 INJECTION INTRAVENOUS at 08:42

## 2019-05-15 RX ADMIN — PANTOPRAZOLE SODIUM 40 MG: 40 TABLET, DELAYED RELEASE ORAL at 22:15

## 2019-05-15 RX ADMIN — ASPIRIN 81 MG 324 MG: 81 TABLET ORAL at 02:01

## 2019-05-15 NOTE — PLAN OF CARE
Problem: Patient Care Overview  Goal: Plan of Care Review  Outcome: Ongoing (interventions implemented as appropriate)   05/15/19 0693   Coping/Psychosocial   Plan of Care Reviewed With patient;spouse   OTHER   Outcome Summary admitted with chf / a fib. ..hx lung canccer, chemotherapy. monitoring labs, output. A fib , rvr, cardizem infusing.

## 2019-05-15 NOTE — CONSULTS
Patient Care Team:  Woody Mathis DO as PCP - General  Woody Mathis DO as PCP - Family Medicine  Woody Mathis DO as Referring Physician (Family Medicine)  Prisca Talley APRN as Nurse Practitioner (Otolaryngology)  Reagan Campos MD as Consulting Physician (Otolaryngology)  Chris Meyer MD as Cardiologist (Cardiology)  Lisa Walton MD as Referring Physician (Hematology and Oncology)  Marylin Posey MD  REASON FOR REFERRAL: afib, chf   Chief complaint : dyspnea on exertion, weakness, lower extremity edema     Subjective     Patient is a 79 y.o. male presents with progressively worsening shortness of breath with exertion and lower extremity edema. He began treatment for non Hodgkins lymphoma with lung mass in January. Approximately 3-4 weeks ago he completed 5 rounds of chemotherapy and 3 doses of Revlimid. Additionally, he has intermittently been on steroids (he also completed these 3-4 weeks ago).  He states he has had some degree and ROJAS and lower extremity edema throughout his cancer treatment, but symptoms became more severe over than past 4-7 days. He admits he can feel occasional palpitations. He was seen in the office by Dr. Meyer on 3/28/19 for paroxsymal atrial fibrillation. At that time his heart rate was not terribly fast at rest, and the patient felt his tachycardia was driven by steroids. Rate controlling agents were not pursued at that visit, but low dose short acting Cardizem was added 1-2 weeks later for afib, rvr. He has been compliant with this for the past month, but he tells me he believes he's remained tachycardic for several weeks despite this. He was not anticoagulated due to his ongoing cancer treatment and the associated risk of anemia and thrombocytopenia. His echo 1/2019 revealed a normal LVEF and mild LVH. He denies chest pain. EKG reveals afib, rvr, no acute STT changes. HR are ranging from 110s-160s at rest on max IV  Cardizem gtt (he remains in afib, rvr with frequent PVCs). Troponin reveals a flat trend and reached 0.252. BNP is elevated. CTA ruled out PE but revealed a small pericardial effusion, and pleural effusions. He has received a total of 80 mg of IV lasix thus far and is net negative 2,300 cc so far.     Review of Systems   Review of Systems   Constitutional: Positive for fatigue. Negative for diaphoresis, fever and unexpected weight change.   HENT: Negative for nosebleeds.    Respiratory: Positive for shortness of breath. Negative for apnea, cough, chest tightness and wheezing.    Cardiovascular: Positive for palpitations and leg swelling. Negative for chest pain.   Gastrointestinal: Negative for abdominal distention, nausea and vomiting.   Genitourinary: Negative for hematuria.   Musculoskeletal: Negative for gait problem.   Skin: Negative for color change.   Neurological: Positive for weakness. Negative for dizziness, syncope and light-headedness.       History  Past Medical History:   Diagnosis Date   • Arthritis    • Cancer (CMS/HCC)     skin   • Diabetes mellitus (CMS/HCC)     borderline, no medication   • Disease of thyroid gland    • Dysrhythmia    • HL (hearing loss)    • Hyperlipidemia    • Hypertension    • IBS (irritable bowel syndrome)    • Lung mass 12/24/2018   • Neuropathy    • Non Hodgkin's lymphoma (CMS/HCC)      Past Surgical History:   Procedure Laterality Date   • CARDIAC CATHETERIZATION     • PARATHYROID GLAND SURGERY     • PROSTATE SURGERY      PROSTATECTOMY   • SKIN CANCER EXCISION       Family History   Problem Relation Age of Onset   • Heart disease Mother    • Cancer Father    • Heart disease Brother      Social History     Tobacco Use   • Smoking status: Never Smoker   • Smokeless tobacco: Never Used   Substance Use Topics   • Alcohol use: No   • Drug use: No     Medications Prior to Admission   Medication Sig Dispense Refill Last Dose   • B Complex Vitamins (VITAMIN B COMPLEX) capsule  capsule Take 1 capsule by mouth Daily.   5/14/2019 at Unknown time   • diltiaZEM (CARDIZEM) 30 MG tablet Take 1 tablet by mouth 4 (Four) Times a Day. 30 tablet 11 5/14/2019 at Unknown time   • furosemide (LASIX) 20 MG tablet Take 20 mg by mouth Daily.   5/14/2019 at Unknown time   • gabapentin (NEURONTIN) 100 MG capsule Take 100 mg by mouth 3 (Three) Times a Day.   5/14/2019 at Unknown time   • HYDROcodone-acetaminophen (NORCO) 7.5-325 MG per tablet Take 1 tablet by mouth Every 8 (Eight) Hours As Needed for Moderate Pain . 10 tablet 0 5/14/2019 at Unknown time   • loratadine (CLARITIN) 10 MG tablet Take 10 mg by mouth Daily.   5/14/2019 at Unknown time   • megestrol (MEGACE ES) 625 MG/5ML suspension Take 400 mg by mouth Daily.   5/14/2019 at Unknown time   • melatonin 5 MG tablet tablet Take 5 mg by mouth Every Night.   5/14/2019 at Unknown time   • metFORMIN (GLUCOPHAGE) 500 MG tablet Take 1,000 mg by mouth 2 (Two) Times a Day With Meals.   5/14/2019 at Unknown time   • pantoprazole (PROTONIX) 40 MG EC tablet Take 40 mg by mouth 2 (Two) Times a Day.   5/14/2019 at Unknown time   • polyethylene glycol (MIRALAX) packet Take 17 g by mouth As Needed.   5/14/2019 at Unknown time   • POTASSIUM CHLORIDE PO Take 20 mEq by mouth 2 (Two) Times a Day.   5/14/2019 at Unknown time   • Lidocaine-Prilocaine, Bulk, 2.5-2.5 % cream Apply 1 application topically to the appropriate area as directed As Needed (APPLY TO AREA OF PORT 30 MIN- 1 HR PRIOR TO PORT ACCESS).   More than a month at Unknown time   • ondansetron ODT (ZOFRAN-ODT) 8 MG disintegrating tablet Take 8 mg by mouth Every 8 (Eight) Hours As Needed.  2 Taking   • UNKNOWN TO PATIENT Patient taking 2 medications for sore mouth, one that dissolves in his mouth the other is a swish and swallow   Taking       Current Facility-Administered Medications:   •  acetaminophen (TYLENOL) tablet 650 mg, 650 mg, Oral, Q4H PRN, Marylin Posey MD  •  diltiaZEM (CARDIZEM) 125 mg in  sodium chloride 0.9 % 125 mL (1 mg/mL) infusion, 5-15 mg/hr, Intravenous, Titrated, Devan Ingram MD, Last Rate: 15 mL/hr at 05/15/19 0844, 15 mg/hr at 05/15/19 0844  •  enoxaparin (LOVENOX) syringe 40 mg, 40 mg, Subcutaneous, Q24H, Marylin Posey MD, 40 mg at 05/15/19 0842  •  furosemide (LASIX) injection 40 mg, 40 mg, Intravenous, Q12H, Knees, Perri E, APRN  •  gabapentin (NEURONTIN) capsule 100 mg, 100 mg, Oral, TID, Marylin Posey MD, 100 mg at 05/15/19 0841  •  HYDROcodone-acetaminophen (NORCO) 7.5-325 MG per tablet 1 tablet, 1 tablet, Oral, Q8H PRN, Marylin Posey MD  •  megestrol (MEGACE) 40 MG/ML suspension 400 mg, 400 mg, Oral, Daily, Marylin Posey MD  •  melatonin tablet 5.25 mg, 5.25 mg, Oral, Nightly, Marylin Posey MD  •  nitroglycerin (NITROSTAT) SL tablet 0.4 mg, 0.4 mg, Sublingual, Q5 Min PRN, Marylin Posey MD  •  ondansetron (ZOFRAN) injection 4 mg, 4 mg, Intravenous, Q6H PRN, Marylin Posey MD  •  pantoprazole (PROTONIX) EC tablet 40 mg, 40 mg, Oral, BID, Marylin Posey MD, 40 mg at 05/15/19 0841  •  polyethylene glycol (MIRALAX) powder 17 g, 17 g, Oral, Daily PRN, Marylin Posey MD  •  potassium chloride (MICRO-K) CR capsule 20 mEq, 20 mEq, Oral, Daily, Stephane Ruvalcaba DO, 20 mEq at 05/15/19 0841  •  sodium chloride 0.9 % flush 3 mL, 3 mL, Intravenous, Q12H, Marylin Posey MD, 3 mL at 05/15/19 0842  •  sodium chloride 0.9 % flush 3-10 mL, 3-10 mL, Intravenous, PRN, Marylin Posey MD  Allergies:  Patient has no known allergies.    Objective     Vital Signs  Temp:  [97.5 °F (36.4 °C)-98.6 °F (37 °C)] 98.6 °F (37 °C)  Heart Rate:  [] 53  Resp:  [18-23] 18  BP: ()/(62-83) 104/67    Physical Exam:   Physical Exam   Constitutional: He is oriented to person, place, and time. He appears well-developed and well-nourished. He has a sickly appearance. No distress.   HENT:   Head: Normocephalic and atraumatic.   Eyes: Pupils are  equal, round, and reactive to light.   Neck: Normal range of motion. Neck supple. No JVD present. No thyromegaly present.   Cardiovascular: Normal heart sounds and intact distal pulses. An irregularly irregular rhythm present. Tachycardia present. Exam reveals no gallop and no friction rub.   No murmur heard.  Pulmonary/Chest: Effort normal. No respiratory distress. He has decreased breath sounds (bases). He has no wheezes. He has no rales. He exhibits no tenderness.   Abdominal: Soft. Bowel sounds are normal. He exhibits no distension. There is no tenderness.   Musculoskeletal: Normal range of motion. He exhibits edema (3-4+ BLE edema, pre tibial and pedal ).   Neurological: He is alert and oriented to person, place, and time. No cranial nerve deficit.   Skin: Skin is warm and dry. He is not diaphoretic.   Psychiatric: He has a normal mood and affect. His behavior is normal.     Results Review:     Lab Results (last 72 hours)     Procedure Component Value Units Date/Time    Troponin [[id number]]  (Abnormal) Collected:  05/15/19 0420    Specimen:  Blood Updated:  05/15/19 0451     Troponin I 0.237 ng/mL     Urinalysis With Culture If Indicated - Urine, Clean Catch [[id number]]  (Normal) Collected:  05/15/19 0313    Specimen:  Urine, Clean Catch Updated:  05/15/19 0349     Color, UA Yellow     Appearance, UA Clear     pH, UA 6.5     Specific Gravity, UA 1.017     Glucose, UA Negative     Ketones, UA Negative     Bilirubin, UA Negative     Blood, UA Negative     Protein, UA Negative     Leuk Esterase, UA Negative     Nitrite, UA Negative     Urobilinogen, UA 1.0 E.U./dL    Narrative:       Urine microscopic not indicated.    CBC & Differential [[id number]] Collected:  05/15/19 0053    Specimen:  Blood Updated:  05/15/19 0156    Narrative:       The following orders were created for panel order CBC & Differential.  Procedure                               Abnormality         Status                     ---------                                -----------         ------                     CBC Auto Differential[[id number]]        Abnormal            Final result                 Please view results for these tests on the individual orders.    CBC Auto Differential [[id number]]  (Abnormal) Collected:  05/15/19 0053    Specimen:  Blood Updated:  05/15/19 0156     WBC 4.36 10*3/mm3      RBC 3.08 10*6/mm3      Hemoglobin 10.3 g/dL      Hematocrit 30.1 %      MCV 97.7 fL      MCH 33.4 pg      MCHC 34.2 g/dL      RDW 19.2 %      RDW-SD 66.0 fl      MPV 12.8 fL      Platelets 115 10*3/mm3      Neutrophil % 57.8 %      Lymphocyte % 15.6 %      Monocyte % 24.3 %      Eosinophil % 0.7 %      Basophil % 0.9 %      Neutrophils, Absolute 2.52 10*3/mm3      Lymphocytes, Absolute 0.68 10*3/mm3      Monocytes, Absolute 1.06 10*3/mm3      Eosinophils, Absolute 0.03 10*3/mm3      Basophils, Absolute 0.04 10*3/mm3     BNP [[id number]]  (Abnormal) Collected:  05/15/19 0053    Specimen:  Blood Updated:  05/15/19 0131     proBNP 9,170.0 pg/mL     Troponin [[id number]]  (Abnormal) Collected:  05/15/19 0053    Specimen:  Blood Updated:  05/15/19 0131     Troponin I 0.252 ng/mL     Comprehensive Metabolic Panel [[id number]]  (Abnormal) Collected:  05/15/19 0053    Specimen:  Blood Updated:  05/15/19 0123     Glucose 98 mg/dL      BUN 27 mg/dL      Creatinine 0.76 mg/dL      Sodium 132 mmol/L      Potassium 4.9 mmol/L      Chloride 98 mmol/L      CO2 22.0 mmol/L      Calcium 9.3 mg/dL      Total Protein 5.7 g/dL      Albumin 3.60 g/dL      ALT (SGPT) 33 U/L      AST (SGOT) 43 U/L      Alkaline Phosphatase 68 U/L      Total Bilirubin 0.5 mg/dL      eGFR Non African Amer 99 mL/min/1.73      Globulin 2.1 gm/dL      A/G Ratio 1.7 g/dL      BUN/Creatinine Ratio 35.5     Anion Gap 12.0 mmol/L     Narrative:       GFR Normal >60  Chronic Kidney Disease <60  Kidney Failure <15    Protime-INR [[id number]]  (Normal) Collected:  05/15/19 0053    Specimen:  Blood Updated:  05/15/19  0123     Protime 14.4 Seconds      INR 1.08    aPTT [[id number]]  (Normal) Collected:  05/15/19 0053    Specimen:  Blood Updated:  05/15/19 0123     PTT 28.7 seconds           Assessment/Plan     -Persistent atrial fibrillation, rapid ventricular response  -Decompensated acute congestive heart failure, type unknown, echo pending, possibly rate related and/or cancer treatment related   -Pleural effusions   -B cell lymphoma with lung mass - followed/treated by oncology with recent completion of 5 rounds of chemo and Revlimid x 3 doses - he is due for PET scan 5/28   -Mild thrombocytopenia   -Mild hyponatremia   -Diabetes mellitus type II - on metformin at home   -Mildly elevated troponin - flat trend, likely secondary to CHF and AF, RVR, no chest pain, no evidence of ACS     Plan -  Agree with diuresis - will increase lasix from 20 mg to 40 mg IV BID  Monitor renal function, electrolytes, intake, output, daily weights   Low sodium cardiac diabetic diet   BMP, CBC in am   TSH, mag   2d echo  Continue IV Cardizem for now  As he remains tachycardic at rest despite max IV Cardizem, with a soft blood pressure and in decompensated heart failure, will add IV digoxin - load with 250 mcg q6 hr x 3 doses and switch to 125 mg PO daily tomorrow   Plan to add beta blocker when he becomes more compensated from a heart failure standpoint, as BP will tolerate (consider replacing cardizem with metoprolol)  Anticoagulate with Eliquis   Plan for cardioversion after 3-4 weeks of uninterrupted anticoagulation (may need CHAYA cardioversion sooner if rate controlling efforts are unsuccessful)   Discussed with Dr. Meyer. Further recommendations following his evaluation of the patient     I discussed the patient's findings and my recommendations with patient and family.     Perri Dill, APRN  05/15/19  9:55 AM

## 2019-05-15 NOTE — ED PROVIDER NOTES
Subjective   79-year-old  male past medical history of large B cell non-Hodgkin's lymphoma, hypertension, hyperlipidemia presenting to the emergency department with shortness of breath and lower extreme edema.  Patient reports that he has had worsening lower extremity edema over the last 4 weeks significant worsening over the last 3 days.  States symptoms are bilaterally.  He again states he has had worsening shortness of breath over the last 4 weeks but over the last 3 days it has been significantly worse.  He has been diagnosed with proximal atrial fibrillation and has been put on Cardizem by Dr. Meyer.  Patient has recently finished his fifth round of chemotherapy.  He has been told side effect of this medication is increased bilateral lower extreme the swelling.  There was a discussion with the cardiologist and oncologist about anticoagulation however they decided not to use this secondary to side effects to the chemotherapy medication.  Patient denies chest pain, nausea, vomiting, fever, chills, increased cough or hemoptysis.  Patient has no other complaints at this time.        History provided by:  Patient      Review of Systems   Constitutional: Negative for chills, diaphoresis, fatigue and fever.   HENT: Negative for congestion and trouble swallowing.    Respiratory: Positive for shortness of breath. Negative for wheezing.    Cardiovascular: Positive for leg swelling. Negative for chest pain and palpitations.   Gastrointestinal: Negative for abdominal pain, diarrhea and nausea.   Genitourinary: Negative for dysuria.   Musculoskeletal: Negative for arthralgias and myalgias.   Neurological: Negative for dizziness and numbness.   Hematological: Negative for adenopathy. Does not bruise/bleed easily.       Past Medical History:   Diagnosis Date   • Arthritis    • Cancer (CMS/HCC)     skin   • Diabetes mellitus (CMS/HCC)     borderline, no medication   • Disease of thyroid gland    • Dysrhythmia    •  HL (hearing loss)    • Hyperlipidemia    • Hypertension    • IBS (irritable bowel syndrome)    • Lung mass 12/24/2018   • Neuropathy    • Non Hodgkin's lymphoma (CMS/HCC)        No Known Allergies    Past Surgical History:   Procedure Laterality Date   • CARDIAC CATHETERIZATION     • PARATHYROID GLAND SURGERY     • PROSTATE SURGERY      PROSTATECTOMY   • SKIN CANCER EXCISION         Family History   Problem Relation Age of Onset   • Heart disease Mother    • Cancer Father    • Heart disease Brother        Social History     Socioeconomic History   • Marital status:      Spouse name: Not on file   • Number of children: Not on file   • Years of education: Not on file   • Highest education level: Not on file   Tobacco Use   • Smoking status: Never Smoker   • Smokeless tobacco: Never Used   Substance and Sexual Activity   • Alcohol use: No   • Drug use: No   • Sexual activity: Defer       Lab Results (last 24 hours)     Procedure Component Value Units Date/Time    CBC & Differential [[id number]] Collected:  05/15/19 0053    Specimen:  Blood Updated:  05/15/19 0156    Narrative:       The following orders were created for panel order CBC & Differential.  Procedure                               Abnormality         Status                     ---------                               -----------         ------                     CBC Auto Differential[[id number]]        Abnormal            Final result                 Please view results for these tests on the individual orders.    Comprehensive Metabolic Panel [[id number]]  (Abnormal) Collected:  05/15/19 0053    Specimen:  Blood Updated:  05/15/19 0123     Glucose 98 mg/dL      BUN 27 mg/dL      Creatinine 0.76 mg/dL      Sodium 132 mmol/L      Potassium 4.9 mmol/L      Chloride 98 mmol/L      CO2 22.0 mmol/L      Calcium 9.3 mg/dL      Total Protein 5.7 g/dL      Albumin 3.60 g/dL      ALT (SGPT) 33 U/L      AST (SGOT) 43 U/L      Alkaline Phosphatase 68 U/L      Total  Bilirubin 0.5 mg/dL      eGFR Non African Amer 99 mL/min/1.73      Globulin 2.1 gm/dL      A/G Ratio 1.7 g/dL      BUN/Creatinine Ratio 35.5     Anion Gap 12.0 mmol/L     Narrative:       GFR Normal >60  Chronic Kidney Disease <60  Kidney Failure <15    Protime-INR [[id number]]  (Normal) Collected:  05/15/19 0053    Specimen:  Blood Updated:  05/15/19 0123     Protime 14.4 Seconds      INR 1.08    aPTT [[id number]]  (Normal) Collected:  05/15/19 0053    Specimen:  Blood Updated:  05/15/19 0123     PTT 28.7 seconds     BNP [[id number]]  (Abnormal) Collected:  05/15/19 0053    Specimen:  Blood Updated:  05/15/19 0131     proBNP 9,170.0 pg/mL     Troponin [[id number]]  (Abnormal) Collected:  05/15/19 0053    Specimen:  Blood Updated:  05/15/19 0131     Troponin I 0.252 ng/mL     CBC Auto Differential [[id number]]  (Abnormal) Collected:  05/15/19 0053    Specimen:  Blood Updated:  05/15/19 0156     WBC 4.36 10*3/mm3      RBC 3.08 10*6/mm3      Hemoglobin 10.3 g/dL      Hematocrit 30.1 %      MCV 97.7 fL      MCH 33.4 pg      MCHC 34.2 g/dL      RDW 19.2 %      RDW-SD 66.0 fl      MPV 12.8 fL      Platelets 115 10*3/mm3      Neutrophil % 57.8 %      Lymphocyte % 15.6 %      Monocyte % 24.3 %      Eosinophil % 0.7 %      Basophil % 0.9 %      Neutrophils, Absolute 2.52 10*3/mm3      Lymphocytes, Absolute 0.68 10*3/mm3      Monocytes, Absolute 1.06 10*3/mm3      Eosinophils, Absolute 0.03 10*3/mm3      Basophils, Absolute 0.04 10*3/mm3     Urinalysis With Culture If Indicated - Urine, Clean Catch [[id number]]  (Normal) Collected:  05/15/19 0313    Specimen:  Urine, Clean Catch Updated:  05/15/19 0349     Color, UA Yellow     Appearance, UA Clear     pH, UA 6.5     Specific Gravity, UA 1.017     Glucose, UA Negative     Ketones, UA Negative     Bilirubin, UA Negative     Blood, UA Negative     Protein, UA Negative     Leuk Esterase, UA Negative     Nitrite, UA Negative     Urobilinogen, UA 1.0 E.U./dL    Narrative:       Urine  microscopic not indicated.    Troponin [[id number]]  (Abnormal) Collected:  05/15/19 0420    Specimen:  Blood Updated:  05/15/19 0451     Troponin I 0.237 ng/mL     TSH [[id number]]  (Normal) Collected:  05/15/19 1024    Specimen:  Blood Updated:  05/15/19 1150     TSH 1.650 mIU/mL           Objective   Physical Exam   Constitutional: He is oriented to person, place, and time. He appears well-developed and well-nourished. No distress.   HENT:   Head: Normocephalic and atraumatic.   Right Ear: External ear normal.   Left Ear: External ear normal.   Eyes: Conjunctivae and EOM are normal. Pupils are equal, round, and reactive to light. Right eye exhibits no discharge. Left eye exhibits no discharge. No scleral icterus.   Neck: Normal range of motion. Neck supple. No tracheal deviation present. No thyromegaly present.   Cardiovascular: Normal rate, normal heart sounds and intact distal pulses. An irregularly irregular rhythm present. Exam reveals no friction rub.   No murmur heard.  Port placed in left upper chest wall.   Pulmonary/Chest: Effort normal and breath sounds normal. No respiratory distress. He has no wheezes.   Abdominal: Soft. Bowel sounds are normal. He exhibits no distension. There is no tenderness. There is no guarding.   Musculoskeletal:   2+ pitting edema to the knees bilaterally.   Neurological: He is alert and oriented to person, place, and time.   Skin: Skin is warm and dry. Capillary refill takes less than 2 seconds. He is not diaphoretic.   Psychiatric: He has a normal mood and affect. His behavior is normal.   Nursing note and vitals reviewed.      Procedures         CT Angiogram Chest With Contrast   Final Result       1. Moderate right and small left pleural effusions. Adjacent atelectasis   at the lower lobe bases.   2. Pathologic left aorticopulmonary window lymph node has decreased in   size from December 2018 but stable from March. No residual left upper   lobe mass.   3. New mildly enlarged  "right hilar lymph node, cannot differentiate   reactive node from new site of disease.   4. Small pericardial effusion.       A preliminary report was provided by [facility].       This report was finalized on 05/15/2019 07:19 by Dr Denis Montes, .      XR Chest 1 View   ED Interpretation   nad      Final Result   Small pleural effusions and probable retrocardiac atelectasis.   This report was finalized on 05/15/2019 07:10 by Dr Denis Montes, .          BP 92/56 (BP Location: Right arm, Patient Position: Lying)   Pulse 107   Temp 98.4 °F (36.9 °C) (Oral)   Resp 20   Ht 188 cm (74.02\")   Wt 75.9 kg (167 lb 6 oz)   SpO2 93%   BMI 21.48 kg/m²     ED Course    ED Course as of May 15 1559   Wed May 15, 2019   0148 Troponin I: (!) 0.252 [CP]   0148 proBNP: (!) 9,170.0 [CP]   0149 Sodium: (!) 132 [CP]   0221 Initial EKG at 0 16 shows A. fib with RVR low voltage rate of 131.  [CP]   0246 Patient has been given 2 Cardizem bolus 20 mg with heart rate now between 101 10.  His blood pressure has slightly improved and is 100/80.  [CP]   0253 This patient will be transferred Dr. Ramirez at my shift change.  I informed him of patient's history, review of systems and current work-up at this time.   [CP]   0346 Interpretation Summary        · Left ventricular wall thickness is consistent with mild concentric hypertrophy.  · Left ventricular systolic function is normal.  · Left atrial cavity size is borderline dilated.  · Left ventricular diastolic dysfunction (grade I a) consistent with impaired relaxation.      [JH]   0423 CT shows signs of overload. Troponin seen likely a type II spill secondary to this Hr. His overload, given his last normal echo, may actually be secondary to his rate or even his oncology medications. While his rate was well controlled with bolus pushes it has since gone back to 130. Will place on drip and provide lasix.   [JH]   0541 Received call from radiology regarding pt CTA chest and findings that were not " initially seen on STAT RAD report last evening. I spoke with Dr. Ruvlacaba, the pt admitting physician regarding these new findings and he is aware.   [LF]   1432 pH, UA: 6.5 [CP]      ED Course User Index  [CP] Faraz Wilson PA-C  [] Devan Ingram MD  [LF] Kalani Almendarez, APRN       Medications   diltiaZEM (CARDIZEM) 125 mg in sodium chloride 0.9 % 125 mL (1 mg/mL) infusion (10 mg/hr Intravenous New Bag 5/15/19 1458)   gabapentin (NEURONTIN) capsule 100 mg (100 mg Oral Given 5/15/19 1501)   HYDROcodone-acetaminophen (NORCO) 7.5-325 MG per tablet 1 tablet (not administered)   megestrol (MEGACE) 40 MG/ML suspension 400 mg (400 mg Oral Given 5/15/19 1030)   melatonin tablet 5.25 mg (not administered)   pantoprazole (PROTONIX) EC tablet 40 mg (40 mg Oral Given 5/15/19 0841)   polyethylene glycol (MIRALAX) powder 17 g (not administered)   sodium chloride 0.9 % flush 3 mL (3 mL Intravenous Given 5/15/19 0842)   sodium chloride 0.9 % flush 3-10 mL (not administered)   nitroglycerin (NITROSTAT) SL tablet 0.4 mg (not administered)   acetaminophen (TYLENOL) tablet 650 mg (not administered)   ondansetron (ZOFRAN) injection 4 mg (not administered)   potassium chloride (MICRO-K) CR capsule 20 mEq (20 mEq Oral Given 5/15/19 0841)   furosemide (LASIX) injection 40 mg (not administered)   digoxin (LANOXIN) injection 250 mcg (250 mcg Intravenous Given 5/15/19 1030)   digoxin (LANOXIN) tablet 125 mcg (not administered)   apixaban (ELIQUIS) tablet 5 mg (not administered)   phenylephrine-mineral oil-petrolatum (PREPARATION H) 0.25-14-74.9 % hemorhoidal ointment (not administered)   diltiaZEM (CARDIZEM) injection 20 mg (20 mg Intravenous Given 5/15/19 0057)   aspirin chewable tablet 324 mg (324 mg Oral Given 5/15/19 0201)   diltiaZEM (CARDIZEM) injection 20 mg (20 mg Intravenous Given 5/15/19 0128)   furosemide (LASIX) injection 60 mg (60 mg Intravenous Given 5/15/19 5053)   iopamidol (ISOVUE-370) 76 %  injection 100 mL (100 mL Intravenous Given 5/15/19 0303)   perflutren (DEFINITY) lipid microspheres injection 1.1736 mg ( Intravenous Given 5/15/19 1210)            MDM    Final diagnoses:   Acute congestive heart failure, unspecified heart failure type (CMS/HCC)   Atrial fibrillation, unspecified type (CMS/HCC)   Elevated troponin   Hypervolemia, unspecified hypervolemia type          Devan Ingram MD  05/15/19 1922       Faraz Wilson PA-C  05/15/19 8177       Faraz Wilson PA-C  05/15/19 1384

## 2019-05-15 NOTE — PROGRESS NOTES
[facility] Medicine Services  INPATIENT PROGRESS NOTE    Length of Stay: 0  Date of Admission: 5/15/2019  Primary Care Physician: Woody Mathis DO    Subjective     Chief Complaint:     Shortness of breath    HPI     The patient is less short of breath today after diuresis was initiated at the time of admission this morning.  Cardiology has seen and evaluated the patient and patient is currently on a diltiazem drip.  He has been loaded with digoxin for further assistance with rate control.  The hope is that he can be transitioned to a beta-blocker for improved rate control.  The patient has been started on Eliquis for anticoagulation.  If the patient's rate cannot be controlled with medication then cardiology states that they may consider cardioversion during this admission.    TSH is within normal limits troponin has decreased to 0.237.  Sodium is 132 but the remainder of the CMP is within normal limits.  Patient is very mildly pancytopenic consistent with chemotherapy for lymphoma with a white count of 4400 hemoglobin 10.3 and platelet count of 115,000.  Echocardiogram was obtained today showing an ejection fraction of 41 to 45%.  Ejection fraction on the echocardiogram of January this year was 66 to 70%.  Output has exceeded intake by 2900 cc since admission.    Review of Systems     All pertinent negatives and positives are as above. All other systems have been reviewed and are negative unless otherwise stated.     Objective    Temp:  [97.5 °F (36.4 °C)-98.6 °F (37 °C)] 98.4 °F (36.9 °C)  Heart Rate:  [] 107  Resp:  [18-23] 20  BP: ()/(56-83) 92/56    Lab Results (last 24 hours)     Procedure Component Value Units Date/Time    TSH [[id number]]  (Normal) Collected:  05/15/19 1024    Specimen:  Blood Updated:  05/15/19 1150     TSH 1.650 mIU/mL     Troponin [[id number]]  (Abnormal) Collected:  05/15/19 0420    Specimen:  Blood Updated:  05/15/19 7435      Troponin I 0.237 ng/mL     Urinalysis With Culture If Indicated - Urine, Clean Catch [[id number]]  (Normal) Collected:  05/15/19 0313    Specimen:  Urine, Clean Catch Updated:  05/15/19 0349     Color, UA Yellow     Appearance, UA Clear     pH, UA 6.5     Specific Gravity, UA 1.017     Glucose, UA Negative     Ketones, UA Negative     Bilirubin, UA Negative     Blood, UA Negative     Protein, UA Negative     Leuk Esterase, UA Negative     Nitrite, UA Negative     Urobilinogen, UA 1.0 E.U./dL    Narrative:       Urine microscopic not indicated.    CBC & Differential [[id number]] Collected:  05/15/19 0053    Specimen:  Blood Updated:  05/15/19 0156    Narrative:       The following orders were created for panel order CBC & Differential.  Procedure                               Abnormality         Status                     ---------                               -----------         ------                     CBC Auto Differential[[id number]]        Abnormal            Final result                 Please view results for these tests on the individual orders.    CBC Auto Differential [[id number]]  (Abnormal) Collected:  05/15/19 0053    Specimen:  Blood Updated:  05/15/19 0156     WBC 4.36 10*3/mm3      RBC 3.08 10*6/mm3      Hemoglobin 10.3 g/dL      Hematocrit 30.1 %      MCV 97.7 fL      MCH 33.4 pg      MCHC 34.2 g/dL      RDW 19.2 %      RDW-SD 66.0 fl      MPV 12.8 fL      Platelets 115 10*3/mm3      Neutrophil % 57.8 %      Lymphocyte % 15.6 %      Monocyte % 24.3 %      Eosinophil % 0.7 %      Basophil % 0.9 %      Neutrophils, Absolute 2.52 10*3/mm3      Lymphocytes, Absolute 0.68 10*3/mm3      Monocytes, Absolute 1.06 10*3/mm3      Eosinophils, Absolute 0.03 10*3/mm3      Basophils, Absolute 0.04 10*3/mm3     BNP [[id number]]  (Abnormal) Collected:  05/15/19 0053    Specimen:  Blood Updated:  05/15/19 0131     proBNP 9,170.0 pg/mL     Troponin [[id number]]  (Abnormal) Collected:  05/15/19 0053    Specimen:  Blood  Updated:  05/15/19 0131     Troponin I 0.252 ng/mL     Comprehensive Metabolic Panel [[id number]]  (Abnormal) Collected:  05/15/19 0053    Specimen:  Blood Updated:  05/15/19 0123     Glucose 98 mg/dL      BUN 27 mg/dL      Creatinine 0.76 mg/dL      Sodium 132 mmol/L      Potassium 4.9 mmol/L      Chloride 98 mmol/L      CO2 22.0 mmol/L      Calcium 9.3 mg/dL      Total Protein 5.7 g/dL      Albumin 3.60 g/dL      ALT (SGPT) 33 U/L      AST (SGOT) 43 U/L      Alkaline Phosphatase 68 U/L      Total Bilirubin 0.5 mg/dL      eGFR Non African Amer 99 mL/min/1.73      Globulin 2.1 gm/dL      A/G Ratio 1.7 g/dL      BUN/Creatinine Ratio 35.5     Anion Gap 12.0 mmol/L     Narrative:       GFR Normal >60  Chronic Kidney Disease <60  Kidney Failure <15    Protime-INR [[id number]]  (Normal) Collected:  05/15/19 0053    Specimen:  Blood Updated:  05/15/19 0123     Protime 14.4 Seconds      INR 1.08    aPTT [[id number]]  (Normal) Collected:  05/15/19 0053    Specimen:  Blood Updated:  05/15/19 0123     PTT 28.7 seconds           Imaging Results (last 24 hours)     Procedure Component Value Units Date/Time    CT Angiogram Chest With Contrast [[id number]] Collected:  05/15/19 0710     Updated:  05/15/19 0722    Narrative:       History:  79-year-old with shortness of breath and atrial fibrillation.     Reference:  CT chest 03/11/2019.     Technique:  Following the administration of intravenous contrast, helical  acquisition was obtained from the thoracic inlet through the diaphragm  during the arterial phase. Multiplanar reconstructed images including 3D  MIP were obtained.     For this CT exam, one or more of the following dose reduction techniques  was employed:  -automated exposure control  -mA and/or kVp adjustment for patient size  -iterative reconstruction      mGy-cm     Findings:     Vascular findings:  No pulmonary embolus. No thoracic aortic dissection. There is mild  calcified plaque throughout the thoracic aorta.  The heart is enlarged.  Extensive three-vessel coronary artery atherosclerotic calcifications.  There is a small pericardial effusion.      Nonvascular findings:  2.2 x 1.7 cm lymph node in the aorticopulmonary window has decreased in  size from December 2018 when measured 3.5 x 2.6 cm. There are mildly  enlarged right hilar lymph node measuring 2.8 x 1.2 cm. Left subclavian  Port-A-Cath, tip in SVC.     Moderate right pleural effusion. Small left pleural effusion. Partial  process of atelectasis at the lower lobe bases. There is peribronchial  thickening. No residual pulmonary mass or nodule. No endobronchial mass.     Imaging to the upper abdomen shows mesenteric edema. Slight gallbladder  wall thickening likely related to vascular congestion. No bone  metastatic disease. Degenerative changes of the spine.          Impression:          1. Moderate right and small left pleural effusions. Adjacent atelectasis  at the lower lobe bases.  2. Pathologic left aorticopulmonary window lymph node has decreased in  size from December 2018 but stable from March. No residual left upper  lobe mass.  3. New mildly enlarged right hilar lymph node, cannot differentiate  reactive node from new site of disease.  4. Small pericardial effusion.     A preliminary report was provided by [employer].     This report was finalized on 05/15/2019 07:19 by Dr Denis Montes, .    XR Chest 1 View [[id number]] Collected:  05/15/19 0708     Updated:  05/15/19 0713    Narrative:       History:  79-year-old with shortness of breath.     Reference:  CT chest 03/11/2019.     Findings:  Frontal chest radiograph performed.     Normal heart size. Suspect atelectasis in the retrocardiac region. Small  pleural effusions. Left subclavian Port-A-Cath, tip SVC. Atherosclerotic  thoracic aorta. Degenerative hypertrophic changes of the thoracic spine.          Impression:       Small pleural effusions and probable retrocardiac atelectasis.  This report was finalized  on 05/15/2019 07:10 by Dr Denis Montes, .             Intake/Output Summary (Last 24 hours) at 5/15/2019 1508  Last data filed at 5/15/2019 1504  Gross per 24 hour   Intake 480 ml   Output 3400 ml   Net -2920 ml       Physical Exam   Constitutional: He is oriented to person, place, and time. He appears well-developed and well-nourished. He is cooperative.   HENT:   Head: Normocephalic and atraumatic.   Nose: Nose normal.   Mouth/Throat: Oropharynx is clear and moist.   Eyes: Conjunctivae are normal. No scleral icterus.   Neck: Normal range of motion. Neck supple. No JVD present.   Cardiovascular: Normal rate, regular rhythm, normal heart sounds and intact distal pulses.   No murmur heard.  Pulmonary/Chest: Effort normal and breath sounds normal. He has no wheezes.   Abdominal: Soft. Bowel sounds are normal. He exhibits no mass. There is no tenderness.   Musculoskeletal: Normal range of motion. He exhibits no edema or tenderness.   Neurological: He is alert and oriented to person, place, and time. Coordination normal.   Skin: Skin is warm and dry. No rash noted.   Psychiatric: He has a normal mood and affect.       Results Review:  I have reviewed the labs, radiology results, and diagnostic studies since my last progress note and made treatment changes reflective of the results.   I have reviewed the current medications.    Assessment/Plan     Active Hospital Problems    Diagnosis   • **Acute pulmonary edema (CMS/HCC)   • Pleural effusion, bilateral   • Elevated troponin   • Diffuse large B cell lymphoma (CMS/HCC)   • Lower extremity edema   • Acute congestive heart failure (CMS/HCC)   • Anemia   • Paroxysmal atrial fibrillation with rapid ventricular response (CMS/HCC)       PLAN:  Continue IV Lasix 40 mg every 12 hours  Continue digoxin for rate control.  Wean diltiazem drip as heart rate improves    Stephane Ruvalcaba DO   05/15/19   3:08 PM

## 2019-05-15 NOTE — PROGRESS NOTES
Discharge Planning Assessment  [facility]     Patient Name: Aram Nunez  MRN: [id number]  Today's Date: 5/15/2019    Admit Date: 5/15/2019    Discharge Needs Assessment     Row Name 05/15/19 1209       Living Environment    Lives With  spouse    Name(s) of Who Lives With Patient  Gina    Current Living Arrangements  home/apartment/condo    Primary Care Provided by  self    Provides Primary Care For  no one    Family Caregiver if Needed  spouse    Quality of Family Relationships  helpful;involved;supportive       Resource/Environmental Concerns    Resource/Environmental Concerns  none       Transition Planning    Patient/Family Anticipates Transition to  home with family;long term care facility    Transportation Anticipated  family or friend will provide;health plan transportation       Discharge Needs Assessment    Readmission Within the Last 30 Days  no previous admission in last 30 days    Concerns to be Addressed  no discharge needs identified    Equipment Currently Used at Home  walker, rolling;glucometer    Equipment Needed After Discharge  none    Current Discharge Risk  chronically ill;terminally ill    Discharge Coordination/Progress  Pt was sleeping  SW spoke to pt's spouse who states that pt will go home upon dc and denies any needs.  She stated pt is receiving cancer treatment.  She stated she doesn't feel he needs  services.  Pt is currently obs status.  SW will follow for any dc needs that arise.         Discharge Plan    No documentation.       Destination      No service coordination in this encounter.      Durable Medical Equipment      No service coordination in this encounter.      Dialysis/Infusion      No service coordination in this encounter.      Home Medical Care      No service coordination in this encounter.      Therapy      No service coordination in this encounter.      Community Resources      No service coordination in this encounter.          Demographic Summary    No  documentation.       Functional Status    No documentation.       Psychosocial    No documentation.       Abuse/Neglect    No documentation.       Legal    No documentation.       Substance Abuse    No documentation.       Patient Forms    No documentation.           ISIS CastroW

## 2019-05-15 NOTE — H&P
[facility]  HISTORY AND PHYSICAL    Date of Admission: 5/15/2019  Primary Care Physician: Woody Mathis DO    Subjective     Chief Complaint: shortness of breath, lower extremity swelling    History of Present Illness  Patient is a 79-year-old white male with known past history of B-cell lymphoma, diabetes diet controlled, A. fib with RVR, and hypertension who presents to the ER with progressive shortness of breath and increasing lower extremity edema.  Patient has been on chemotherapy for his lymphoma since about January and received 5 treatments however has not had any chemo in about 4 weeks.  He is followed by Dr. Keila HERRING.  Patient saw Dr. Meyer on March 19 for issues of an irregular heart rate sent from oncology.  At that time he did have some lower extremity swelling and was found to have A. fib on EKG with some continued issues with rapid rate therefore eventually was started on Cardizem.  He thinks he has been on this for about 1 month timeframe and takes 30 mg 4 times a day.  Despite this he is continued to have rate issues when checked at home.  He can tell sometimes based on palpitations.  He denies having any chest pain.  He states that his lower extremity swelling has progressively gotten much worse.  He was given Lasix by his oncologist however his first dose was yesterday and he obviously has not responded to that at this point.  In the ER the patient has otherwise had stable vital signs.  He has been given Cardizem bolus x2 with continued rapid rate as high as 170s.  He is just been started on a Cardizem drip.  His CT angios shows no evidence of PE however bilateral pleural effusions other congestion noted and cardiomegaly.  He has elevated BNP as well.  Last echocardiogram in January was fairly unremarkable.  He does also have a mildly elevated troponin here as well as an elevated BNP.        Review of Systems     Otherwise  complete ROS reviewed and negative except as mentioned in the HPI.    Past Medical History:   Past Medical History:   Diagnosis Date   • Arthritis    • Cancer (CMS/HCC)     skin   • Diabetes mellitus (CMS/HCC)     borderline, no medication   • Disease of thyroid gland    • Dysrhythmia    • HL (hearing loss)    • Hyperlipidemia    • Hypertension    • IBS (irritable bowel syndrome)    • Neuropathy    • Non Hodgkin's lymphoma (CMS/HCC)      Past Surgical History:  Past Surgical History:   Procedure Laterality Date   • CARDIAC CATHETERIZATION     • PARATHYROID GLAND SURGERY     • PROSTATE SURGERY      PROSTATECTOMY   • SKIN CANCER EXCISION       Social History:  reports that he has never smoked. He has never used smokeless tobacco. He reports that he does not drink alcohol or use drugs.    Family History: family history includes Cancer in his father; Heart disease in his brother and mother.       Allergies:  No Known Allergies  Medications:  Prior to Admission medications    Medication Sig Start Date End Date Taking? Authorizing Provider   B Complex Vitamins (VITAMIN B COMPLEX) capsule capsule Take 1 capsule by mouth Daily.   Yes Reyes Stewart MD   diltiaZEM (CARDIZEM) 30 MG tablet Take 1 tablet by mouth 4 (Four) Times a Day. 4/10/19  Yes Chris Meyer MD   furosemide (LASIX) 20 MG tablet Take 20 mg by mouth 2 (Two) Times a Day.   Yes Reyes Stewart MD   gabapentin (NEURONTIN) 100 MG capsule Take 100 mg by mouth 3 (Three) Times a Day.   Yes Reyes Stewart MD   HYDROcodone-acetaminophen (NORCO) 7.5-325 MG per tablet Take 1 tablet by mouth Every 8 (Eight) Hours As Needed for Moderate Pain . 12/24/18  Yes Lidia Leos MD   Lidocaine-Prilocaine, Bulk, 2.5-2.5 % cream Apply 1 application topically to the appropriate area as directed As Needed (APPLY TO AREA OF PORT 30 MIN- 1 HR PRIOR TO PORT ACCESS).   Yes Reyes Stewart MD   loratadine (CLARITIN) 10 MG tablet Take 10 mg by mouth  "Daily.   Yes Reyes Stewart MD   megestrol (MEGACE ES) 625 MG/5ML suspension Take 400 mg by mouth Daily.   Yes Reyes Stewart MD   melatonin 5 MG tablet tablet Take 5 mg by mouth Every Night.   Yes Reyes Stewart MD   metFORMIN (GLUCOPHAGE) 500 MG tablet Take 1,000 mg by mouth 2 (Two) Times a Day With Meals.   Yes Reyes Stewart MD   pantoprazole (PROTONIX) 40 MG EC tablet Take 40 mg by mouth 2 (Two) Times a Day.   Yes Reyes Stewart MD   polyethylene glycol (MIRALAX) packet Take 17 g by mouth As Needed.   Yes Reyes Stewart MD   POTASSIUM CHLORIDE PO Take 20 mEq by mouth 2 (Two) Times a Day.   Yes Reyes Stewart MD   allopurinol (ZYLOPRIM) 300 MG tablet Take 300 mg by mouth Daily.    Reyes Stewart MD   aspirin 81 MG EC tablet Take 81 mg by mouth Daily.    Reyes Stewart MD   lenalidomide (REVLIMID) 15 MG capsule Take 15 mg by mouth Daily.    Reyes Stewart MD   levoFLOXacin (LEVAQUIN) 750 MG tablet Take 750 mg by mouth Daily.    Reyes Stewart MD   ondansetron ODT (ZOFRAN-ODT) 8 MG disintegrating tablet Take 8 mg by mouth Every 8 (Eight) Hours As Needed. 2/11/19   Reyes Stewart MD   UNKNOWN TO PATIENT Patient taking 2 medications for sore mouth, one that dissolves in his mouth the other is a swish and swallow    Reyes Stewart MD     Objective     Vital Signs: /65 (BP Location: Left arm)   Pulse (!) 136   Temp 98.4 °F (36.9 °C) (Oral)   Resp 22   Ht 188 cm (74\")   Wt 78.7 kg (173 lb 6.4 oz)   SpO2 95%   BMI 22.26 kg/m²   Physical Exam   Constitutional: He is oriented to person, place, and time. He appears well-developed and well-nourished.   Patient is well-appearing speaking to me in complete sentences not confused answering appropriately   HENT:   Head: Normocephalic and atraumatic.   Eyes: Conjunctivae and EOM are normal. Pupils are equal, round, and reactive to light.   Neck: Neck supple. No JVD present. No " thyromegaly present.   Cardiovascular: Normal rate, regular rhythm, normal heart sounds and intact distal pulses. Exam reveals no gallop and no friction rub.   No murmur heard.  Pulmonary/Chest: Effort normal. No respiratory distress. He has no wheezes. He has no rales. He exhibits no tenderness.   Diminished breath sounds at the bases   Abdominal: Soft. Bowel sounds are normal. He exhibits no distension. There is no tenderness. There is no rebound and no guarding.   Musculoskeletal: Normal range of motion. He exhibits edema (3+ pitting edema in his lower extremities). He exhibits no tenderness or deformity.   Lymphadenopathy:     He has no cervical adenopathy.   Neurological: He is alert and oriented to person, place, and time. He displays normal reflexes. No cranial nerve deficit. He exhibits normal muscle tone.   Skin: Skin is warm and dry. No rash noted.   Psychiatric: He has a normal mood and affect. His behavior is normal. Judgment and thought content normal.           Results Reviewed:  Lab Results (last 24 hours)     Procedure Component Value Units Date/Time    Troponin [[id number]]  (Abnormal) Collected:  05/15/19 0420    Specimen:  Blood Updated:  05/15/19 0451     Troponin I 0.237 ng/mL     Urinalysis With Culture If Indicated - Urine, Clean Catch [[id number]]  (Normal) Collected:  05/15/19 0313    Specimen:  Urine, Clean Catch Updated:  05/15/19 0349     Color, UA Yellow     Appearance, UA Clear     pH, UA 6.5     Specific Gravity, UA 1.017     Glucose, UA Negative     Ketones, UA Negative     Bilirubin, UA Negative     Blood, UA Negative     Protein, UA Negative     Leuk Esterase, UA Negative     Nitrite, UA Negative     Urobilinogen, UA 1.0 E.U./dL    Narrative:       Urine microscopic not indicated.    CBC & Differential [[id number]] Collected:  05/15/19 0053    Specimen:  Blood Updated:  05/15/19 0156    Narrative:       The following orders were created for panel order CBC & Differential.  Procedure                                Abnormality         Status                     ---------                               -----------         ------                     CBC Auto Differential[[id number]]        Abnormal            Final result                 Please view results for these tests on the individual orders.    CBC Auto Differential [[id number]]  (Abnormal) Collected:  05/15/19 0053    Specimen:  Blood Updated:  05/15/19 0156     WBC 4.36 10*3/mm3      RBC 3.08 10*6/mm3      Hemoglobin 10.3 g/dL      Hematocrit 30.1 %      MCV 97.7 fL      MCH 33.4 pg      MCHC 34.2 g/dL      RDW 19.2 %      RDW-SD 66.0 fl      MPV 12.8 fL      Platelets 115 10*3/mm3      Neutrophil % 57.8 %      Lymphocyte % 15.6 %      Monocyte % 24.3 %      Eosinophil % 0.7 %      Basophil % 0.9 %      Neutrophils, Absolute 2.52 10*3/mm3      Lymphocytes, Absolute 0.68 10*3/mm3      Monocytes, Absolute 1.06 10*3/mm3      Eosinophils, Absolute 0.03 10*3/mm3      Basophils, Absolute 0.04 10*3/mm3     BNP [[id number]]  (Abnormal) Collected:  05/15/19 0053    Specimen:  Blood Updated:  05/15/19 0131     proBNP 9,170.0 pg/mL     Troponin [[id number]]  (Abnormal) Collected:  05/15/19 0053    Specimen:  Blood Updated:  05/15/19 0131     Troponin I 0.252 ng/mL     Comprehensive Metabolic Panel [[id number]]  (Abnormal) Collected:  05/15/19 0053    Specimen:  Blood Updated:  05/15/19 0123     Glucose 98 mg/dL      BUN 27 mg/dL      Creatinine 0.76 mg/dL      Sodium 132 mmol/L      Potassium 4.9 mmol/L      Chloride 98 mmol/L      CO2 22.0 mmol/L      Calcium 9.3 mg/dL      Total Protein 5.7 g/dL      Albumin 3.60 g/dL      ALT (SGPT) 33 U/L      AST (SGOT) 43 U/L      Alkaline Phosphatase 68 U/L      Total Bilirubin 0.5 mg/dL      eGFR Non African Amer 99 mL/min/1.73      Globulin 2.1 gm/dL      A/G Ratio 1.7 g/dL      BUN/Creatinine Ratio 35.5     Anion Gap 12.0 mmol/L     Narrative:       GFR Normal >60  Chronic Kidney Disease <60  Kidney Failure <15     Protime-INR [[id number]]  (Normal) Collected:  05/15/19 0053    Specimen:  Blood Updated:  05/15/19 0123     Protime 14.4 Seconds      INR 1.08    aPTT [[id number]]  (Normal) Collected:  05/15/19 0053    Specimen:  Blood Updated:  05/15/19 0123     PTT 28.7 seconds         Imaging Results (last 24 hours)     Procedure Component Value Units Date/Time    CT Angiogram Chest With Contrast [[id number]] Updated:  05/15/19 0354    XR Chest 1 View [[id number]] Resulted:  05/15/19 0234     Updated:  05/15/19 0234        I have personally reviewed and interpreted the radiology studies and ECG obtained at time of admission.     Assessment / Plan     Assessment:   Active Hospital Problems    Diagnosis   • Acute congestive heart failure (CMS/HCC)   • Pleural effusion, bilateral   • Elevated troponin   • Diffuse large B cell lymphoma (CMS/HCC)   • Lower extremity edema   • Paroxysmal atrial fibrillation with rapid ventricular response (CMS/HCC)         Plan:    1. cardizem gtt although may need additional agents as he currently remains tachy after about 10min on gtt. bp may not tolerate higher dose  2. Cardiology consult as sees dr goldstein for this issue  3. IV lasix/weights/i/o  4. 2 d echo, last in jan was ok, had an order to get one on Thursday anyway  5. lovenox dvt proph, did not do full dose as he and dr goldstein had decided to not anticoagulate. May reconsider now that he is finished with chemo  6. Serial troponins, am labs      Code Status: full     I discussed the patient's findings and my recommendations with the pt and wife    Estimated length of stay 2-3 days    Marylin Posey MD   05/15/19   5:09 AM

## 2019-05-15 NOTE — PLAN OF CARE
Problem: Patient Care Overview  Goal: Plan of Care Review  Outcome: Ongoing (interventions implemented as appropriate)   05/15/19 0186   Coping/Psychosocial   Plan of Care Reviewed With patient   OTHER   Outcome Summary Patient denies pain. VSS, HR elevated, AFib on tele and currently on a Cardizem gtt. Safety maintained, fall risk. Spouse at bedside. Continue to monitor overall condition.    Plan of Care Review   Progress no change

## 2019-05-16 ENCOUNTER — HOSPITAL ENCOUNTER (OUTPATIENT)
Dept: CARDIOLOGY | Facility: HOSPITAL | Age: 79
End: 2019-05-16

## 2019-05-16 PROBLEM — I50.20 SYSTOLIC HEART FAILURE (HCC): Status: ACTIVE | Noted: 2019-05-16

## 2019-05-16 PROBLEM — I50.21 ACUTE SYSTOLIC (CONGESTIVE) HEART FAILURE (HCC): Status: ACTIVE | Noted: 2019-05-16

## 2019-05-16 LAB
ANION GAP SERPL CALCULATED.3IONS-SCNC: 7 MMOL/L (ref 4–13)
ANISOCYTOSIS BLD QL: ABNORMAL
ARTICHOKE IGE QN: 81 MG/DL (ref 0–99)
BASOPHILS # BLD MANUAL: 0.06 10*3/MM3 (ref 0–0.2)
BASOPHILS NFR BLD AUTO: 2.1 % (ref 0–2)
BUN BLD-MCNC: 20 MG/DL (ref 5–21)
BUN/CREAT SERPL: 27 (ref 7–25)
CALCIUM SPEC-SCNC: 9.4 MG/DL (ref 8.4–10.4)
CHLORIDE SERPL-SCNC: 100 MMOL/L (ref 98–110)
CHOLEST SERPL-MCNC: 123 MG/DL (ref 130–200)
CO2 SERPL-SCNC: 29 MMOL/L (ref 24–31)
CREAT BLD-MCNC: 0.74 MG/DL (ref 0.5–1.4)
DEPRECATED RDW RBC AUTO: 66.2 FL (ref 40–54)
EOSINOPHIL # BLD MANUAL: 0.06 10*3/MM3 (ref 0–0.7)
EOSINOPHIL NFR BLD MANUAL: 2.1 % (ref 0–4)
ERYTHROCYTE [DISTWIDTH] IN BLOOD BY AUTOMATED COUNT: 18.8 % (ref 12–15)
GFR SERPL CREATININE-BSD FRML MDRD: 102 ML/MIN/1.73
GIANT PLATELETS: ABNORMAL
GLUCOSE BLD-MCNC: 105 MG/DL (ref 70–100)
HBA1C MFR BLD: 5.8 %
HCT VFR BLD AUTO: 29.5 % (ref 40–52)
HDLC SERPL-MCNC: 33 MG/DL
HGB BLD-MCNC: 10 G/DL (ref 14–18)
LDLC/HDLC SERPL: 2.1 {RATIO}
LYMPHOCYTES # BLD MANUAL: 0.33 10*3/MM3 (ref 0.72–4.86)
LYMPHOCYTES NFR BLD MANUAL: 10.4 % (ref 4–12)
LYMPHOCYTES NFR BLD MANUAL: 11.5 % (ref 15–45)
MACROCYTES BLD QL SMEAR: ABNORMAL
MCH RBC QN AUTO: 33.1 PG (ref 28–32)
MCHC RBC AUTO-ENTMCNC: 33.9 G/DL (ref 33–36)
MCV RBC AUTO: 97.7 FL (ref 82–95)
MONOCYTES # BLD AUTO: 0.3 10*3/MM3 (ref 0.19–1.3)
NEUTROPHILS # BLD AUTO: 2.01 10*3/MM3 (ref 1.87–8.4)
NEUTROPHILS NFR BLD MANUAL: 70.8 % (ref 39–78)
NRBC SPEC MANUAL: 5.2 /100 WBC (ref 0–0.2)
PLATELET # BLD AUTO: 92 10*3/MM3 (ref 130–400)
PMV BLD AUTO: 11.7 FL (ref 6–12)
POIKILOCYTOSIS BLD QL SMEAR: ABNORMAL
POTASSIUM BLD-SCNC: 4 MMOL/L (ref 3.5–5.3)
RBC # BLD AUTO: 3.02 10*6/MM3 (ref 4.8–5.9)
SMALL PLATELETS BLD QL SMEAR: ABNORMAL
SODIUM BLD-SCNC: 136 MMOL/L (ref 135–145)
TRIGL SERPL-MCNC: 104 MG/DL (ref 0–149)
VARIANT LYMPHS NFR BLD MANUAL: 3.1 % (ref 0–5)
WBC MORPH BLD: NORMAL
WBC NRBC COR # BLD: 2.84 10*3/MM3 (ref 4.8–10.8)

## 2019-05-16 PROCEDURE — 80061 LIPID PANEL: CPT | Performed by: NURSE PRACTITIONER

## 2019-05-16 PROCEDURE — 99232 SBSQ HOSP IP/OBS MODERATE 35: CPT | Performed by: NURSE PRACTITIONER

## 2019-05-16 PROCEDURE — 85025 COMPLETE CBC W/AUTO DIFF WBC: CPT | Performed by: FAMILY MEDICINE

## 2019-05-16 PROCEDURE — 25010000002 FUROSEMIDE PER 20 MG: Performed by: NURSE PRACTITIONER

## 2019-05-16 PROCEDURE — 97162 PT EVAL MOD COMPLEX 30 MIN: CPT

## 2019-05-16 PROCEDURE — 83036 HEMOGLOBIN GLYCOSYLATED A1C: CPT | Performed by: NURSE PRACTITIONER

## 2019-05-16 PROCEDURE — 80048 BASIC METABOLIC PNL TOTAL CA: CPT | Performed by: FAMILY MEDICINE

## 2019-05-16 PROCEDURE — 85007 BL SMEAR W/DIFF WBC COUNT: CPT | Performed by: FAMILY MEDICINE

## 2019-05-16 RX ORDER — FUROSEMIDE 40 MG/1
40 TABLET ORAL DAILY
Status: DISCONTINUED | OUTPATIENT
Start: 2019-05-17 | End: 2019-05-17

## 2019-05-16 RX ORDER — DILTIAZEM HCL 90 MG
90 TABLET ORAL EVERY 6 HOURS SCHEDULED
Status: DISCONTINUED | OUTPATIENT
Start: 2019-05-16 | End: 2019-05-19

## 2019-05-16 RX ADMIN — SODIUM CHLORIDE, PRESERVATIVE FREE 3 ML: 5 INJECTION INTRAVENOUS at 08:56

## 2019-05-16 RX ADMIN — Medication 5.25 MG: at 20:19

## 2019-05-16 RX ADMIN — DIGOXIN 125 MCG: 125 TABLET ORAL at 10:57

## 2019-05-16 RX ADMIN — PANTOPRAZOLE SODIUM 40 MG: 40 TABLET, DELAYED RELEASE ORAL at 08:56

## 2019-05-16 RX ADMIN — DILTIAZEM HYDROCHLORIDE 10 MG/HR: 5 INJECTION INTRAVENOUS at 06:24

## 2019-05-16 RX ADMIN — PANTOPRAZOLE SODIUM 40 MG: 40 TABLET, DELAYED RELEASE ORAL at 17:50

## 2019-05-16 RX ADMIN — FUROSEMIDE 40 MG: 10 INJECTION, SOLUTION INTRAVENOUS at 06:29

## 2019-05-16 RX ADMIN — APIXABAN 5 MG: 5 TABLET, FILM COATED ORAL at 08:56

## 2019-05-16 RX ADMIN — DILTIAZEM HYDROCHLORIDE 90 MG: 90 TABLET, FILM COATED ORAL at 12:08

## 2019-05-16 RX ADMIN — SODIUM CHLORIDE, PRESERVATIVE FREE 3 ML: 5 INJECTION INTRAVENOUS at 20:19

## 2019-05-16 RX ADMIN — GABAPENTIN 100 MG: 100 CAPSULE ORAL at 20:20

## 2019-05-16 RX ADMIN — GABAPENTIN 100 MG: 100 CAPSULE ORAL at 08:56

## 2019-05-16 RX ADMIN — GABAPENTIN 100 MG: 100 CAPSULE ORAL at 17:50

## 2019-05-16 RX ADMIN — DILTIAZEM HYDROCHLORIDE 90 MG: 90 TABLET, FILM COATED ORAL at 17:50

## 2019-05-16 RX ADMIN — APIXABAN 5 MG: 5 TABLET, FILM COATED ORAL at 20:19

## 2019-05-16 RX ADMIN — POTASSIUM CHLORIDE 20 MEQ: 750 CAPSULE, EXTENDED RELEASE ORAL at 08:56

## 2019-05-16 RX ADMIN — MEGESTROL ACETATE 400 MG: 40 SUSPENSION ORAL at 08:56

## 2019-05-16 NOTE — PROGRESS NOTES
[facility]  INPATIENT PROGRESS NOTE    Length of Stay: 1  Date of Admission: 5/15/2019  Primary Care Physician: Woody Mathis DO    Subjective     Chief Complaint:     Shortness of breath    HPI     Patient states that he is feeling significantly better.  He has not been short of breath during exertion in his room.  Cardizem drip continues with good rate control at this point averaging less than 100/min.  He has diuresed extremely well with 6800 cc out greater than in.  The patient will be transitioned to oral diuretics in order to avoid volume depletion and renal injury.  Cardizem drip is weaning and the patient has been started on oral Cardizem.    Review of Systems     All pertinent negatives and positives are as above. All other systems have been reviewed and are negative unless otherwise stated.     Objective    Temp:  [98.4 °F (36.9 °C)-99.3 °F (37.4 °C)] 99.3 °F (37.4 °C)  Heart Rate:  [] 90  Resp:  [18-20] 18  BP: ()/(56-68) 128/68    Lab Results (last 24 hours)     Procedure Component Value Units Date/Time    Hemoglobin A1c [[id number]] Collected:  05/16/19 0510    Specimen:  Blood Updated:  05/16/19 0741     Hemoglobin A1C 5.8 %     Narrative:       Less than 6.0           Non-Diabetic Range  6.0-7.0                 ADA Therapeutic Target  Greater than 7.0        Action Suggested    CBC & Differential [[id number]] Collected:  05/16/19 0510    Specimen:  Blood Updated:  05/16/19 0603    Narrative:       The following orders were created for panel order CBC & Differential.  Procedure                               Abnormality         Status                     ---------                               -----------         ------                     CBC Auto Differential[[id number]]        Abnormal            Final result                 Please view results for these tests on the individual orders.    CBC Auto Differential [[id number]]   (Abnormal) Collected:  05/16/19 0510    Specimen:  Blood Updated:  05/16/19 0603     WBC 2.84 10*3/mm3      RBC 3.02 10*6/mm3      Hemoglobin 10.0 g/dL      Hematocrit 29.5 %      MCV 97.7 fL      MCH 33.1 pg      MCHC 33.9 g/dL      RDW 18.8 %      RDW-SD 66.2 fl      MPV 11.7 fL      Platelets 92 10*3/mm3     Manual Differential [[id number]]  (Abnormal) Collected:  05/16/19 0510    Specimen:  Blood Updated:  05/16/19 0603     Neutrophil % 70.8 %      Lymphocyte % 11.5 %      Monocyte % 10.4 %      Eosinophil % 2.1 %      Basophil % 2.1 %      Atypical Lymphocyte % 3.1 %      Neutrophils Absolute 2.01 10*3/mm3      Lymphocytes Absolute 0.33 10*3/mm3      Monocytes Absolute 0.30 10*3/mm3      Eosinophils Absolute 0.06 10*3/mm3      Basophils Absolute 0.06 10*3/mm3      nRBC 5.2 /100 WBC      Anisocytosis Slight/1+     Macrocytes Slight/1+     Poikilocytes Slight/1+     WBC Morphology Normal     Platelet Estimate Decreased     Giant Platelets Slight/1+    Lipid Panel [[id number]]  (Abnormal) Collected:  05/16/19 0510    Specimen:  Blood Updated:  05/16/19 0602     Total Cholesterol 123 mg/dL      Triglycerides 104 mg/dL      HDL Cholesterol 33 mg/dL      LDL Cholesterol  81 mg/dL      LDL/HDL Ratio 2.10    Basic Metabolic Panel [[id number]]  (Abnormal) Collected:  05/16/19 0510    Specimen:  Blood Updated:  05/16/19 0553     Glucose 105 mg/dL      BUN 20 mg/dL      Creatinine 0.74 mg/dL      Sodium 136 mmol/L      Potassium 4.0 mmol/L      Chloride 100 mmol/L      CO2 29.0 mmol/L      Calcium 9.4 mg/dL      eGFR Non African Amer 102 mL/min/1.73      BUN/Creatinine Ratio 27.0     Anion Gap 7.0 mmol/L     Narrative:       GFR Normal >60  Chronic Kidney Disease <60  Kidney Failure <15          Imaging Results (last 24 hours)     ** No results found for the last 24 hours. **             Intake/Output Summary (Last 24 hours) at 5/16/2019 1506  Last data filed at 5/16/2019 0900  Gross per 24 hour   Intake 480 ml   Output 4400  ml   Net -3920 ml       Physical Exam    Constitutional: He is oriented to person, place, and time. He appears well-developed and well-nourished. He is cooperative.   HENT:   Head: Normocephalic and atraumatic.   Nose: Nose normal.   Mouth/Throat: Oropharynx is clear and moist.   Eyes: Conjunctivae are normal. No scleral icterus.   Neck: Normal range of motion. Neck supple. No JVD present.   Cardiovascular: Normal rate, regular rhythm, normal heart sounds and intact distal pulses.   No murmur heard.  Pulmonary/Chest: Effort normal and breath sounds normal. He has no wheezes.  No rales present.  Abdominal: Soft. Bowel sounds are normal. He exhibits no mass. There is no tenderness.   Musculoskeletal: Normal range of motion. He exhibits no edema or tenderness.   Neurological: He is alert and oriented to person, place, and time. Coordination normal.   Skin: Skin is warm and dry. No rash noted.   Psychiatric: He has a normal mood and affect.         Results Review:  I have reviewed the labs, radiology results, and diagnostic studies since my last progress note and made treatment changes reflective of the results.   I have reviewed the current medications.    Assessment/Plan     Active Hospital Problems    Diagnosis   • **Acute pulmonary edema (CMS/HCC)   • Pleural effusion, bilateral   • Elevated troponin   • Diffuse large B cell lymphoma (CMS/HCC)   • Lower extremity edema   • Acute congestive heart failure (CMS/HCC)   • Anemia   • Paroxysmal atrial fibrillation with rapid ventricular response (CMS/HCC)       PLAN:  DC IV Lasix  Lasix 40 mg p.o. daily starting tomorrow morning  Cardizem drip is weaning  Oral Cardizem has been initiated by cardiology    Stephane Ruvalcaba DO   05/16/19   3:06 PM

## 2019-05-16 NOTE — PLAN OF CARE
Problem: Patient Care Overview  Goal: Plan of Care Review  Outcome: Ongoing (interventions implemented as appropriate)   05/16/19 1232   Coping/Psychosocial   Plan of Care Reviewed With patient;spouse   OTHER   Outcome Summary PT eval completed. Discussion w/ RN prior to eval who reports concern re: high fall risk. Pt performs bed mobility I and demonstrates good sitting balance. Good LE strength assessed in seated position. Pt decrease standing balance noting wide KAMERON w/ tfer sit to/from standing and w/ gait short distance also noting decrease step length w/ decrease foot clearance. Noted occassional balance checks w/ standing/gait activities w/ increase trunk sway. Pt would benefit from continued PT services to improve balance, safety awareness and activity tolerance reducing fall risk and increase I w/ safe mobility. Education provided re: safety/falls prevention. Provided pt w/ rwx for use while in the hospital. Recommend home w/ assist and HH upon discharge.   Plan of Care Review   Progress improving

## 2019-05-16 NOTE — PROGRESS NOTES
"[facility] -  Progress Note     LOS: 1 day   Patient Care Team:  Woody Mathis DO as PCP - General  Woody Mathis DO as PCP - Family Medicine  Woody Mathis DO as Referring Physician (Family Medicine)  Prisca Talley APRN as Nurse Practitioner (Otolaryngology)  Reagan Campos MD as Consulting Physician (Otolaryngology)  Chris Meyer MD as Cardiologist (Cardiology)  Lisa Walton MD as Referring Physician (Hematology and Oncology)    Chief Complaint: Shortness of Breath    Subjective     Interval History:   Patient states he feels better. Shortness of breath is better but he states -\"They won't let me do anything.\" Leg swelling is improved but still present. Denies chest pain or palpitations. Remains on cardizem drip. Down roughly 6#. Net negative 5L yesterday. Labs stable. 2 episodes of nonsustained V-tach overnight. Rates are still variable but improving. This am mostly less than 100. He reports having trouble sleeping last night.       Review of Systems:   Review of Systems   Cardiovascular: Positive for dyspnea on exertion, leg swelling and orthopnea.   Respiratory: Positive for shortness of breath.         Objective     Vital Sign Min/Max for last 24 hours  Temp  Min: 98.4 °F (36.9 °C)  Max: 98.7 °F (37.1 °C)   BP  Min: 92/56  Max: 123/57   Pulse  Min: 57  Max: 119   Resp  Min: 18  Max: 20   SpO2  Min: 90 %  Max: 99 %   No Data Recorded   Weight  Min: 72.9 kg (160 lb 12.8 oz)  Max: 72.9 kg (160 lb 12.8 oz)         05/15/19  1937   Weight: 72.9 kg (160 lb 12.8 oz)         Intake/Output Summary (Last 24 hours) at 5/16/2019 1043  Last data filed at 5/16/2019 0900  Gross per 24 hour   Intake 720 ml   Output 5500 ml   Net -4780 ml         Physical Exam:  Physical Exam   Constitutional: He is oriented to person, place, and time. He appears well-developed and well-nourished.   Chronically ill    HENT:   Head: Normocephalic and atraumatic. "   Eyes: Pupils are equal, round, and reactive to light.   Neck: Normal range of motion. Neck supple. No JVD present. Carotid bruit is not present.   Cardiovascular: Normal rate, normal heart sounds and intact distal pulses. An irregular rhythm present.   Pulmonary/Chest: Effort normal. He has decreased breath sounds in the right upper field and the left lower field.   Abdominal: Soft. Bowel sounds are normal.   Musculoskeletal: Normal range of motion. He exhibits edema (2+ biltaerl edema- improved).   Neurological: He is alert and oriented to person, place, and time. He has normal reflexes.   Skin: Skin is warm and dry.   Psychiatric: He has a normal mood and affect. His behavior is normal. Judgment and thought content normal.        Results Review:   Lab Results (last 72 hours)     Procedure Component Value Units Date/Time    Hemoglobin A1c [[id number]] Collected:  05/16/19 0510    Specimen:  Blood Updated:  05/16/19 0741     Hemoglobin A1C 5.8 %     Narrative:       Less than 6.0           Non-Diabetic Range  6.0-7.0                 ADA Therapeutic Target  Greater than 7.0        Action Suggested    CBC & Differential [[id number]] Collected:  05/16/19 0510    Specimen:  Blood Updated:  05/16/19 0603    Narrative:       The following orders were created for panel order CBC & Differential.  Procedure                               Abnormality         Status                     ---------                               -----------         ------                     CBC Auto Differential[[id number]]        Abnormal            Final result                 Please view results for these tests on the individual orders.    CBC Auto Differential [[id number]]  (Abnormal) Collected:  05/16/19 0510    Specimen:  Blood Updated:  05/16/19 0603     WBC 2.84 10*3/mm3      RBC 3.02 10*6/mm3      Hemoglobin 10.0 g/dL      Hematocrit 29.5 %      MCV 97.7 fL      MCH 33.1 pg      MCHC 33.9 g/dL      RDW 18.8 %      RDW-SD 66.2 fl      MPV  11.7 fL      Platelets 92 10*3/mm3     Manual Differential [[id number]]  (Abnormal) Collected:  05/16/19 0510    Specimen:  Blood Updated:  05/16/19 0603     Neutrophil % 70.8 %      Lymphocyte % 11.5 %      Monocyte % 10.4 %      Eosinophil % 2.1 %      Basophil % 2.1 %      Atypical Lymphocyte % 3.1 %      Neutrophils Absolute 2.01 10*3/mm3      Lymphocytes Absolute 0.33 10*3/mm3      Monocytes Absolute 0.30 10*3/mm3      Eosinophils Absolute 0.06 10*3/mm3      Basophils Absolute 0.06 10*3/mm3      nRBC 5.2 /100 WBC      Anisocytosis Slight/1+     Macrocytes Slight/1+     Poikilocytes Slight/1+     WBC Morphology Normal     Platelet Estimate Decreased     Giant Platelets Slight/1+    Lipid Panel [[id number]]  (Abnormal) Collected:  05/16/19 0510    Specimen:  Blood Updated:  05/16/19 0602     Total Cholesterol 123 mg/dL      Triglycerides 104 mg/dL      HDL Cholesterol 33 mg/dL      LDL Cholesterol  81 mg/dL      LDL/HDL Ratio 2.10    Basic Metabolic Panel [[id number]]  (Abnormal) Collected:  05/16/19 0510    Specimen:  Blood Updated:  05/16/19 0553     Glucose 105 mg/dL      BUN 20 mg/dL      Creatinine 0.74 mg/dL      Sodium 136 mmol/L      Potassium 4.0 mmol/L      Chloride 100 mmol/L      CO2 29.0 mmol/L      Calcium 9.4 mg/dL      eGFR Non African Amer 102 mL/min/1.73      BUN/Creatinine Ratio 27.0     Anion Gap 7.0 mmol/L     Narrative:       GFR Normal >60  Chronic Kidney Disease <60  Kidney Failure <15    TSH [[id number]]  (Normal) Collected:  05/15/19 1024    Specimen:  Blood Updated:  05/15/19 1150     TSH 1.650 mIU/mL     Troponin [[id number]]  (Abnormal) Collected:  05/15/19 0420    Specimen:  Blood Updated:  05/15/19 0451     Troponin I 0.237 ng/mL     Urinalysis With Culture If Indicated - Urine, Clean Catch [[id number]]  (Normal) Collected:  05/15/19 0313    Specimen:  Urine, Clean Catch Updated:  05/15/19 0349     Color, UA Yellow     Appearance, UA Clear     pH, UA 6.5     Specific [place], UA 1.017      Glucose, UA Negative     Ketones, UA Negative     Bilirubin, UA Negative     Blood, UA Negative     Protein, UA Negative     Leuk Esterase, UA Negative     Nitrite, UA Negative     Urobilinogen, UA 1.0 E.U./dL    Narrative:       Urine microscopic not indicated.    CBC & Differential [[id number]] Collected:  05/15/19 0053    Specimen:  Blood Updated:  05/15/19 0156    Narrative:       The following orders were created for panel order CBC & Differential.  Procedure                               Abnormality         Status                     ---------                               -----------         ------                     CBC Auto Differential[[id number]]        Abnormal            Final result                 Please view results for these tests on the individual orders.    CBC Auto Differential [[id number]]  (Abnormal) Collected:  05/15/19 0053    Specimen:  Blood Updated:  05/15/19 0156     WBC 4.36 10*3/mm3      RBC 3.08 10*6/mm3      Hemoglobin 10.3 g/dL      Hematocrit 30.1 %      MCV 97.7 fL      MCH 33.4 pg      MCHC 34.2 g/dL      RDW 19.2 %      RDW-SD 66.0 fl      MPV 12.8 fL      Platelets 115 10*3/mm3      Neutrophil % 57.8 %      Lymphocyte % 15.6 %      Monocyte % 24.3 %      Eosinophil % 0.7 %      Basophil % 0.9 %      Neutrophils, Absolute 2.52 10*3/mm3      Lymphocytes, Absolute 0.68 10*3/mm3      Monocytes, Absolute 1.06 10*3/mm3      Eosinophils, Absolute 0.03 10*3/mm3      Basophils, Absolute 0.04 10*3/mm3     BNP [[id number]]  (Abnormal) Collected:  05/15/19 0053    Specimen:  Blood Updated:  05/15/19 0131     proBNP 9,170.0 pg/mL     Troponin [[id number]]  (Abnormal) Collected:  05/15/19 0053    Specimen:  Blood Updated:  05/15/19 0131     Troponin I 0.252 ng/mL     Comprehensive Metabolic Panel [[id number]]  (Abnormal) Collected:  05/15/19 0053    Specimen:  Blood Updated:  05/15/19 0123     Glucose 98 mg/dL      BUN 27 mg/dL      Creatinine 0.76 mg/dL      Sodium 132 mmol/L      Potassium 4.9  mmol/L      Chloride 98 mmol/L      CO2 22.0 mmol/L      Calcium 9.3 mg/dL      Total Protein 5.7 g/dL      Albumin 3.60 g/dL      ALT (SGPT) 33 U/L      AST (SGOT) 43 U/L      Alkaline Phosphatase 68 U/L      Total Bilirubin 0.5 mg/dL      eGFR Non African Amer 99 mL/min/1.73      Globulin 2.1 gm/dL      A/G Ratio 1.7 g/dL      BUN/Creatinine Ratio 35.5     Anion Gap 12.0 mmol/L     Narrative:       GFR Normal >60  Chronic Kidney Disease <60  Kidney Failure <15    Protime-INR [[id number]]  (Normal) Collected:  05/15/19 0053    Specimen:  Blood Updated:  05/15/19 0123     Protime 14.4 Seconds      INR 1.08    aPTT [[id number]]  (Normal) Collected:  05/15/19 0053    Specimen:  Blood Updated:  05/15/19 0123     PTT 28.7 seconds         Medication Review: yes  Current Facility-Administered Medications   Medication Dose Route Frequency Provider Last Rate Last Dose   • acetaminophen (TYLENOL) tablet 650 mg  650 mg Oral Q4H PRN Marylin Posey MD       • apixaban (ELIQUIS) tablet 5 mg  5 mg Oral Q12H Knees, Perri E, APRN   5 mg at 05/16/19 0856   • digoxin (LANOXIN) tablet 125 mcg  125 mcg Oral Daily Knees, Perri E, APRN       • diltiaZEM (CARDIZEM) 125 mg in sodium chloride 0.9 % 125 mL (1 mg/mL) infusion  5-15 mg/hr Intravenous Titrated Devan Ingram MD 15 mL/hr at 05/16/19 0828 15 mg/hr at 05/16/19 0828   • furosemide (LASIX) injection 40 mg  40 mg Intravenous Q12H Knees, Perri E, APRN   40 mg at 05/16/19 0629   • gabapentin (NEURONTIN) capsule 100 mg  100 mg Oral TID Marylin Posey MD   100 mg at 05/16/19 0856   • HYDROcodone-acetaminophen (NORCO) 7.5-325 MG per tablet 1 tablet  1 tablet Oral Q8H PRN Marylin Posey MD       • megestrol (MEGACE) 40 MG/ML suspension 400 mg  400 mg Oral Daily Marylin Posey MD   400 mg at 05/16/19 0856   • melatonin tablet 5.25 mg  5.25 mg Oral Nightly Marylin Posey MD   3 mg at 05/15/19 2213   • nitroglycerin (NITROSTAT) SL tablet 0.4 mg  0.4 mg Sublingual Q5  Min PRN Marylin Posey MD       • ondansetron (ZOFRAN) injection 4 mg  4 mg Intravenous Q6H PRN Marylin Posey MD       • pantoprazole (PROTONIX) EC tablet 40 mg  40 mg Oral BID Marylin Posey MD   40 mg at 05/16/19 0856   • phenylephrine-mineral oil-petrolatum (PREPARATION H) 0.25-14-74.9 % hemorhoidal ointment   Rectal TID PRN Chris Meyer MD       • polyethylene glycol (MIRALAX) powder 17 g  17 g Oral Daily PRN Marylin Posey MD       • potassium chloride (MICRO-K) CR capsule 20 mEq  20 mEq Oral Daily Stephane Ruvalcaba DO   20 mEq at 05/16/19 0856   • sodium chloride 0.9 % flush 3 mL  3 mL Intravenous Q12H Marylin Posey MD   3 mL at 05/16/19 0856   • sodium chloride 0.9 % flush 3-10 mL  3-10 mL Intravenous PRN Marylin Posey MD             Assessment/Plan       Acute pulmonary edema (CMS/HCC)    Paroxysmal atrial fibrillation with rapid ventricular response (CMS/HCC)    Anemia    Pleural effusion, bilateral    Elevated troponin    Diffuse large B cell lymphoma (CMS/HCC)    Lower extremity edema    Acute congestive heart failure (CMS/HCC)    Plan:  Rates have improved this am with rates 90 to low 100. On Digoxin 125 PO and Cardizem 15 mg drip. Will start 90 Po Cardizem and attempt to wean drip. Eliquis started yesterday. Could consider CHAYA/CV if unable to manage rates.   Continue IV Diuretics, strict I&O, daily weights  Reviewed Echo with patient   Compress and elevate legs  Consider beta blocker also in near future once better compensated     Further recommendations per Dr. Betsy Ledesma, APRN  05/16/19  10:43 AM

## 2019-05-16 NOTE — THERAPY EVALUATION
Acute Care - Physical Therapy Initial Evaluation  [facility]     Patient Name: Aram Nunez  : 1940  MRN: [id number]  Today's Date: 2019   Onset of Illness/Injury or Date of Surgery: 05/15/19  Date of Referral to PT: 05/15/19  Referring Physician: Dr. Ruvalcaba      Admit Date: 5/15/2019    Visit Dx:     ICD-10-CM ICD-9-CM   1. Acute congestive heart failure, unspecified heart failure type (CMS/HCC) I50.9 428.0   2. Atrial fibrillation, unspecified type (CMS/HCC) I48.91 427.31   3. Elevated troponin R74.8 790.6   4. Hypervolemia, unspecified hypervolemia type E87.70 276.69   5. Impaired functional mobility, balance, gait, and endurance Z74.09 V49.89     Patient Active Problem List   Diagnosis   • Paroxysmal atrial fibrillation with rapid ventricular response (CMS/HCC)   • Anemia   • Pleural effusion, bilateral   • Elevated troponin   • Diffuse large B cell lymphoma (CMS/HCC)   • Lower extremity edema   • Acute pulmonary edema (CMS/HCC)   • Acute congestive heart failure (CMS/HCC)     Past Medical History:   Diagnosis Date   • Arthritis    • Cancer (CMS/HCC)     skin   • Diabetes mellitus (CMS/HCC)     borderline, no medication   • Disease of thyroid gland    • Dysrhythmia    • HL (hearing loss)    • Hyperlipidemia    • Hypertension    • IBS (irritable bowel syndrome)    • Lung mass 2018   • Neuropathy    • Non Hodgkin's lymphoma (CMS/HCC)      Past Surgical History:   Procedure Laterality Date   • CARDIAC CATHETERIZATION     • PARATHYROID GLAND SURGERY     • PROSTATE SURGERY      PROSTATECTOMY   • SKIN CANCER EXCISION          PT ASSESSMENT (last 12 hours)      Physical Therapy Evaluation     Row Name 19 1125          PT Evaluation Time/Intention    Subjective Information  complains of;weakness  -JE     Document Type  evaluation;other (see comments) see MAR  -JE     Mode of Treatment  physical therapy  -JE     Patient Effort  good  -JE     Row Name 19 1125          General  Information    Patient Profile Reviewed?  yes  -JE     Onset of Illness/Injury or Date of Surgery  05/15/19  -JE     Referring Physician  Dr. Ruvalcaba  -JE     Patient Observations  alert;cooperative;agree to therapy  -JE     Patient/Family Observations  spouse present  -JE     General Observations of Patient  sitting up in bed, IV infusing, telemetry  -JE     Prior Level of Function  independent:;all household mobility;community mobility;gait;transfer;bed mobility;ADL's;feeding;grooming;dressing;bathing;driving;shopping;work  -JE     Equipment Currently Used at Home  walker, rolling  -JE     Pertinent History of Current Functional Problem  Admitted w/ progressive shortness of breath and increasing LE edema.  Pt dx w/ acute CHF, B pleural effusion, elevated troponin, diffuse large B cell lymphoma, and paroxysmal afib w/ RVR.  -JE     Existing Precautions/Restrictions  fall  -JE     Limitations/Impairments  safety/cognitive  -JE     Equipment Issued to Patient  gait belt;walker, front wheeled  -JE     Risks Reviewed  patient and family:;LOB;dizziness;lines disloged;other (comment) fall risk  -JE     Benefits Reviewed  patient and family:;improve function;increase independence;increase strength;increase balance;increase knowledge;other (comment) safety/falls prevention  -     Row Name 05/16/19 1125          Relationship/Environment    Primary Source of Support/Comfort  spouse  -JE     Lives With  spouse  -     Name(s) of Who Lives With Patient  Gina  -JE     Family Caregiver if Needed  spouse;child(jose), adult  -     Row Name 05/16/19 1125          Resource/Environmental Concerns    Current Living Arrangements  home/apartment/condo  -     Row Name 05/16/19 1125          Cognitive Assessment/Intervention- PT/OT    Orientation Status (Cognition)  oriented x 4  -JE     Follows Commands (Cognition)  WNL  -JE     Safety Deficit (Cognitive)  at risk behavior observed;awareness of need for assistance;insight into  deficits/self awareness;safety precautions awareness;safety precautions follow-through/compliance  -     Row Name 05/16/19 1125          Safety Issues, Functional Mobility    Safety Issues Affecting Function (Mobility)  at risk behavior observed;awareness of need for assistance;insight into deficits/self awareness;safety precaution awareness;safety precautions follow-through/compliance  -     Impairments Affecting Function (Mobility)  balance;endurance/activity tolerance  -     Row Name 05/16/19 1125          Bed Mobility Assessment/Treatment    Bed Mobility Assessment/Treatment  supine-sit  -     Supine-Sit [place] (Bed Mobility)  independent  -     Assistive Device (Bed Mobility)  head of bed elevated  -     Row Name 05/16/19 1125          Transfer Assessment/Treatment    Transfer Assessment/Treatment  sit-stand transfer;stand-sit transfer  -     Comment (Transfers)  increase KAMERON upon rising to standing, noting increase trunk sway and required increase assist to control  -     Sit-Stand [place] (Transfers)  contact guard;minimum assist (75% patient effort)  -     Stand-Sit [place] (Transfers)  contact guard;verbal cues without UE support; good control  -     Row Name 05/16/19 1125          Gait/Stairs Assessment/Training    Gait/Stairs Assessment/Training  gait/ambulation independence;distance ambulated;gait pattern;gait deviations  -     [place] Level (Gait)  contact guard;minimum assist (75% patient effort);verbal cues  -     Distance in Feet (Gait)  short distance in room to include bathroom  -     Pattern (Gait)  step-through  -     Deviations/Abnormal Patterns (Gait)  base of support, wide;gait speed decreased;stride length decreased;other (see comments) decrease foot clearance  -     Comment (Gait/Stairs)  quality poor w/ occassional balance checks w/ increase trunk sway noted  -     Row Name 05/16/19 1125          General ROM    GENERAL ROM COMMENTS  AROM  all 4 extremities WFLs  -JE     Row Name 05/16/19 1125          MMT (Manual Muscle Testing)    General MMT Comments  UEs not formally assessed, however able to move against gravity w/out difficulty; B LEs groslly 4+/5; assessed in seated position  -JE     Row Name 05/16/19 1125          Motor Assessment/Intervention    Additional Documentation  Balance (Group)  -JE     Row Name 05/16/19 1125          Balance    Balance  static sitting balance;static standing balance;dynamic standing balance  -JE     Row Name 05/16/19 1125          Static Sitting Balance    Level of [place] (Unsupported Sitting, Static Balance)  independent  -     Sitting Position (Unsupported Sitting, Static Balance)  sitting on edge of bed  -JE     Row Name 05/16/19 1125          Static Standing Balance    Level of [place] (Unsupported Standing, Static Balance)  contact guard assist  -JE     Row Name 05/16/19 1125          Dynamic Standing Balance    Level of [place], Reaches Outside Midline (Standing, Dynamic Balance)  contact guard assist;minimal assist, 75% patient effort  -JE     Row Name 05/16/19 1125          Sensory Assessment/Intervention    Sensory General Assessment  other (see comments) reports neuropathy in the LEs  -JE     Row Name 05/16/19 1125          Pain Assessment    Additional Documentation  Pain Scale: Numbers Pre/Post-Treatment (Group)  -JE     Row Name 05/16/19 1125          Pain Scale: Numbers Pre/Post-Treatment    Pain Scale: Numbers, Pretreatment  0/10 - no pain  -     Pain Scale: Numbers, Post-Treatment  0/10 - no pain  -JE     Row Name 05/16/19 1125          Edema Assessment & Management    Location (Edema)  lower extremity, left;lower extremity, right  -     Additional Documentation  Location (Edema) (Row)  -JE     Row Name 05/16/19 1125          Coping    Observed Emotional State  accepting;calm;cooperative  -     Verbalized Emotional State  acceptance  -JE     Row Name 05/16/19 1125           Plan of Care Review    Plan of Care Reviewed With  patient;spouse  -DANIEL     Row Name 05/16/19 1125          Physical Therapy Clinical Impression    Date of Referral to PT  05/15/19  -     Patient/Family Goals Statement (PT Clinical Impression)  improve safety reduce fall risk  -     Criteria for Skilled Interventions Met (PT Clinical Impression)  yes;treatment indicated  -     Impairments Found (describe specific impairments)  aerobic capacity/endurance;gait, locomotion, and balance;other (see comments) decrease safety awareness  -     Rehab Potential (PT Clinical Summary)  good, to achieve stated therapy goals  -     Predicted Duration of Therapy (PT)  until discharge or goals achieved  -     Care Plan Review (PT)  evaluation/treatment results reviewed;care plan/treatment goals reviewed;risks/benefits reviewed;current/potential barriers reviewed;patient/other agree to care plan  -     Care Plan Review, Other Participant (PT Clinical Impression)  spouse  -DANIEL     Row Name 05/16/19 1125          Physical Therapy Goals    Transfer Goal Selection (PT)  transfer, PT goal 1  -     Gait Training Goal Selection (PT)  gait training, PT goal 1  -     Balance Goal Selection (PT)  balance, PT goal 1  -     Additional Documentation  Balance Goal Selection (PT) (Row)  -     Row Name 05/16/19 1125          Transfer Goal 1 (PT)    Activity/Assistive Device (Transfer Goal 1, PT)  sit-to-stand/stand-to-sit;bed-to-chair/chair-to-bed;other (see comments) w/ control  -     [place] Level/Cues Needed (Transfer Goal 1, PT)  standby assist;independent  -     Time Frame (Transfer Goal 1, PT)  long term goal (LTG);10 days  -     Progress/Outcome (Transfer Goal 1, PT)  goal ongoing  -     Row Name 05/16/19 1125          Gait Training Goal 1 (PT)    Activity/Assistive Device (Gait Training Goal 1, PT)  gait (walking locomotion);decrease fall risk;forward stepping;backward stepping;normalize weight  shifts;improve balance and speed improve activity tolerance, w/ or w/out a device  -     [place] Level (Gait Training Goal 1, PT)  standby assist;other (see comments) using safe technique  -     Distance (Gait Goal 1, PT)  functional distances   -JE     Time Frame (Gait Training Goal 1, PT)  long term goal (LTG);10 days  -JE     Progress/Outcome (Gait Training Goal 1, PT)  goal ongoing  -     Row Name 05/16/19 1125          Balance Goal 1 (PT)    Activity/Assistive Device (Balance Goal 1, PT)  standing, dynamic  -JE     [place] Level/Cues Needed (Balance Goal 1, PT)  standby assist  -JE     Time Frame (Balance Goal 1, PT)  long term goal (LTG);10 days  -JE     Progress/Outcomes (Balance Goal 1, PT)  goal ongoing  -     Row Name 05/16/19 1125          Patient Education Goal (PT)    Activity (Patient Education Goal, PT)  pt to demonstrate increase safety awareness using equipment as appropriate, not cruising furniture or reaching for walls for improved stability and not relying on unstable hospital equipment to move around room reducing risk for falls w/ mobility  -     [place]/Cues/Accuracy (Memory Goal 2, PT)  demonstrates adequately;verbalizes understanding;independent  -JE     Time Frame (Patient Education Goal, PT)  long term goal (LTG);10 days  -JE     Progress/Outcome (Patient Education Goal, PT)  goal ongoing  -     Row Name 05/16/19 1125          Positioning and Restraints    Post Treatment Position  bed  -     In Bed  sitting EOB;call light within reach;encouraged to call for assist;with family/caregiver;side rails up x2  -       User Key  (r) = Recorded By, (t) = Taken By, (c) = Cosigned By    Initials Name Provider Type    Kassandra Nieves, PT Physical Therapist        Physical Therapy Education     Title: PT OT SLP Therapies (Done)     Topic: Physical Therapy (Done)     Point: Mobility training (Done)     Learning Progress Summary           Patient Acceptance, E,TB,D,  JEYSON,HOWARD,NR by  at 5/16/2019 12:30 PM    Comment:  Education re: purpose of PT and importance of activity; education re: balance deficits and increase fall risk as well as safety/falls prevention w/ emphasis on use of rwx to improve stability   Significant Other Acceptance, E,TB,D, VU,DU,NR by  at 5/16/2019 12:30 PM    Comment:  Education re: purpose of PT and importance of activity; education re: balance deficits and increase fall risk as well as safety/falls prevention w/ emphasis on use of rwx to improve stability                   Point: Precautions (Done)     Learning Progress Summary           Patient Acceptance, E,TB,D, VU,DU,NR by  at 5/16/2019 12:30 PM    Comment:  Education re: purpose of PT and importance of activity; education re: balance deficits and increase fall risk as well as safety/falls prevention w/ emphasis on use of rwx to improve stability   Significant Other Acceptance, E,TB,D, VU,DU,NR by  at 5/16/2019 12:30 PM    Comment:  Education re: purpose of PT and importance of activity; education re: balance deficits and increase fall risk as well as safety/falls prevention w/ emphasis on use of rwx to improve stability                               User Key     Initials Effective Dates Name Provider Type Discipline     08/02/18 -  Kassandra Marcus, PT Physical Therapist PT              PT Recommendation and Plan  Anticipated Discharge Disposition (PT): home with assist, home with home health  Planned Therapy Interventions (PT Eval): balance training, gait training, patient/family education, transfer training, other (see comments), neuromuscular re-education, motor coordination training(safety/falls prevention)  Therapy Frequency (PT Clinical Impression): 2 times/day  Outcome Summary/Treatment Plan (PT)  Anticipated Discharge Disposition (PT): home with assist, home with home health  Plan of Care Reviewed With: patient, spouse  Progress: improving  Outcome Summary: PT eval completed.   Discussion w/ RN prior to eval who reports concern re: high fall risk.  Pt performs bed mobility I and demonstrates good sitting balance.  Good LE strength assessed in seated position.  Pt decrease standing balance noting wide KAMERON w/ tfer sit to/from standing and w/ gait short distance also noting decrease step length w/ decrease foot clearance.  Noted occassional balance checks w/ standing/gait activities w/ increase trunk sway.  Pt would benefit from continued PT services to improve balance, safety awareness and activity tolerance reducing fall risk and increase I w/ safe mobility.  Education provided re: safety/falls prevention.  Provided pt w/ rwx for use while in the hospital.   Recommend home w/ HH upon discharge.  Outcome Measures     Row Name 05/16/19 1300             How much help from another person do you currently need...    Turning from your back to your side while in flat bed without using bedrails?  4  -JE      Moving from lying on back to sitting on the side of a flat bed without bedrails?  4  -JE      Moving to and from a bed to a chair (including a wheelchair)?  3  -JE      Standing up from a chair using your arms (e.g., wheelchair, bedside chair)?  3  -JE      Climbing 3-5 steps with a railing?  3  -JE      To walk in hospital room?  3  -JE      AM-PAC 6 Clicks Score  20  -         Functional Assessment    Outcome Measure Options  AM-PAC 6 Clicks Basic Mobility (PT)  -        User Key  (r) = Recorded By, (t) = Taken By, (c) = Cosigned By    Initials Name Provider Type    Kassandra Nieves, PT Physical Therapist         Time Calculation:   PT Charges     Row Name 05/16/19 1231             Time Calculation    Start Time  1125  -      Stop Time  1219  -      Time Calculation (min)  54 min  -      PT Received On  05/16/19  -      PT Goal Re-Cert Due Date  05/26/19  -        User Key  (r) = Recorded By, (t) = Taken By, (c) = Cosigned By    Initials Name Provider Type    DANIEL Marcus  Kassandra CEDENO, PT Physical Therapist        Therapy Charges for Today     Code Description Service Date Service Provider Modifiers Qty    16566679374 HC PT EVAL MOD COMPLEXITY 4 5/16/2019 Kassandra Marcus, PT GP 1          PT G-Codes  Outcome Measure Options: AM-PAC 6 Clicks Basic Mobility (PT)  AM-PAC 6 Clicks Score: 20      Kassandra Marcus, PT  5/16/2019

## 2019-05-16 NOTE — PLAN OF CARE
Problem: Patient Care Overview  Goal: Plan of Care Review  Outcome: Ongoing (interventions implemented as appropriate)   05/16/19 3499   Coping/Psychosocial   Plan of Care Reviewed With patient   OTHER   Outcome Summary Patient has denied pain today. Cardizem gtt has been d/c for the time being, heart rate has been 75-90 since. patient is very unsteady, safety maintained. BLE edema remains. Continue to monitor.    Plan of Care Review   Progress improving

## 2019-05-16 NOTE — PLAN OF CARE
Problem: Patient Care Overview  Goal: Plan of Care Review  Outcome: Ongoing (interventions implemented as appropriate)   05/16/19 1368   Coping/Psychosocial   Plan of Care Reviewed With patient;spouse   OTHER   Outcome Summary No pain noted. VSS HR elevated to 110 Afib per tele. Cardizem gtt at 10. Dig IV given. bed check in place. spouse at bedside. Cont. to monitor. call MD with concerns.    Plan of Care Review   Progress improving

## 2019-05-17 LAB
ANION GAP SERPL CALCULATED.3IONS-SCNC: 9 MMOL/L (ref 4–13)
BASOPHILS # BLD AUTO: 0.02 10*3/MM3 (ref 0–0.2)
BASOPHILS NFR BLD AUTO: 0.5 % (ref 0–2)
BUN BLD-MCNC: 18 MG/DL (ref 5–21)
BUN/CREAT SERPL: 26.5 (ref 7–25)
CALCIUM SPEC-SCNC: 9.6 MG/DL (ref 8.4–10.4)
CHLORIDE SERPL-SCNC: 99 MMOL/L (ref 98–110)
CO2 SERPL-SCNC: 28 MMOL/L (ref 24–31)
CREAT BLD-MCNC: 0.68 MG/DL (ref 0.5–1.4)
DEPRECATED RDW RBC AUTO: 67.8 FL (ref 40–54)
EOSINOPHIL # BLD AUTO: 0.06 10*3/MM3 (ref 0–0.7)
EOSINOPHIL NFR BLD AUTO: 1.6 % (ref 0–4)
ERYTHROCYTE [DISTWIDTH] IN BLOOD BY AUTOMATED COUNT: 19 % (ref 12–15)
GFR SERPL CREATININE-BSD FRML MDRD: 112 ML/MIN/1.73
GLUCOSE BLD-MCNC: 112 MG/DL (ref 70–100)
HCT VFR BLD AUTO: 32.4 % (ref 40–52)
HGB BLD-MCNC: 10.8 G/DL (ref 14–18)
LYMPHOCYTES # BLD AUTO: 0.74 10*3/MM3 (ref 0.72–4.86)
LYMPHOCYTES NFR BLD AUTO: 19.7 % (ref 15–45)
MCH RBC QN AUTO: 33.1 PG (ref 28–32)
MCHC RBC AUTO-ENTMCNC: 33.3 G/DL (ref 33–36)
MCV RBC AUTO: 99.4 FL (ref 82–95)
MONOCYTES # BLD AUTO: 0.88 10*3/MM3 (ref 0.19–1.3)
MONOCYTES NFR BLD AUTO: 23.5 % (ref 4–12)
NEUTROPHILS # BLD AUTO: 2.03 10*3/MM3 (ref 1.87–8.4)
NEUTROPHILS NFR BLD AUTO: 54.2 % (ref 39–78)
PLATELET # BLD AUTO: 120 10*3/MM3 (ref 130–400)
PMV BLD AUTO: 11.3 FL (ref 6–12)
POTASSIUM BLD-SCNC: 4.4 MMOL/L (ref 3.5–5.3)
RBC # BLD AUTO: 3.26 10*6/MM3 (ref 4.8–5.9)
SODIUM BLD-SCNC: 136 MMOL/L (ref 135–145)
WBC NRBC COR # BLD: 3.75 10*3/MM3 (ref 4.8–10.8)

## 2019-05-17 PROCEDURE — 85025 COMPLETE CBC W/AUTO DIFF WBC: CPT | Performed by: FAMILY MEDICINE

## 2019-05-17 PROCEDURE — 97110 THERAPEUTIC EXERCISES: CPT

## 2019-05-17 PROCEDURE — 25010000002 FUROSEMIDE PER 20 MG: Performed by: INTERNAL MEDICINE

## 2019-05-17 PROCEDURE — 80048 BASIC METABOLIC PNL TOTAL CA: CPT | Performed by: FAMILY MEDICINE

## 2019-05-17 PROCEDURE — 99232 SBSQ HOSP IP/OBS MODERATE 35: CPT | Performed by: INTERNAL MEDICINE

## 2019-05-17 RX ORDER — FUROSEMIDE 10 MG/ML
40 INJECTION INTRAMUSCULAR; INTRAVENOUS EVERY 12 HOURS SCHEDULED
Status: DISCONTINUED | OUTPATIENT
Start: 2019-05-17 | End: 2019-05-19

## 2019-05-17 RX ADMIN — DILTIAZEM HYDROCHLORIDE 90 MG: 90 TABLET, FILM COATED ORAL at 17:12

## 2019-05-17 RX ADMIN — DILTIAZEM HYDROCHLORIDE 90 MG: 90 TABLET, FILM COATED ORAL at 23:54

## 2019-05-17 RX ADMIN — DILTIAZEM HYDROCHLORIDE 90 MG: 90 TABLET, FILM COATED ORAL at 05:25

## 2019-05-17 RX ADMIN — SODIUM CHLORIDE, PRESERVATIVE FREE 3 ML: 5 INJECTION INTRAVENOUS at 08:45

## 2019-05-17 RX ADMIN — MEGESTROL ACETATE 400 MG: 40 SUSPENSION ORAL at 08:45

## 2019-05-17 RX ADMIN — FUROSEMIDE 40 MG: 40 TABLET ORAL at 08:45

## 2019-05-17 RX ADMIN — GABAPENTIN 100 MG: 100 CAPSULE ORAL at 20:33

## 2019-05-17 RX ADMIN — DILTIAZEM HYDROCHLORIDE 90 MG: 90 TABLET, FILM COATED ORAL at 00:30

## 2019-05-17 RX ADMIN — APIXABAN 5 MG: 5 TABLET, FILM COATED ORAL at 08:45

## 2019-05-17 RX ADMIN — METFORMIN HYDROCHLORIDE 1000 MG: 500 TABLET ORAL at 17:12

## 2019-05-17 RX ADMIN — PANTOPRAZOLE SODIUM 40 MG: 40 TABLET, DELAYED RELEASE ORAL at 09:30

## 2019-05-17 RX ADMIN — GABAPENTIN 100 MG: 100 CAPSULE ORAL at 17:12

## 2019-05-17 RX ADMIN — DILTIAZEM HYDROCHLORIDE 90 MG: 90 TABLET, FILM COATED ORAL at 13:01

## 2019-05-17 RX ADMIN — Medication 5.25 MG: at 20:33

## 2019-05-17 RX ADMIN — FUROSEMIDE 40 MG: 10 INJECTION, SOLUTION INTRAVENOUS at 20:33

## 2019-05-17 RX ADMIN — DIGOXIN 125 MCG: 125 TABLET ORAL at 13:01

## 2019-05-17 RX ADMIN — PANTOPRAZOLE SODIUM 40 MG: 40 TABLET, DELAYED RELEASE ORAL at 17:12

## 2019-05-17 RX ADMIN — SODIUM CHLORIDE, PRESERVATIVE FREE 3 ML: 5 INJECTION INTRAVENOUS at 20:33

## 2019-05-17 RX ADMIN — POTASSIUM CHLORIDE 20 MEQ: 750 CAPSULE, EXTENDED RELEASE ORAL at 08:45

## 2019-05-17 RX ADMIN — GABAPENTIN 100 MG: 100 CAPSULE ORAL at 09:30

## 2019-05-17 RX ADMIN — APIXABAN 5 MG: 5 TABLET, FILM COATED ORAL at 20:33

## 2019-05-17 NOTE — PROGRESS NOTES
Nutrition - MSA done 5/17/2019. Pt wasn't drinking Glucose Control BID because he wasn't sure what it was.   Pt agreed to starting drinking. Thanks Amanda Slater.

## 2019-05-17 NOTE — PROGRESS NOTES
"  Chief Complaint   Patient presents with   • Shortness of Breath       S: There were no acute events overnight.  Edema continues to improve.  No other complaints at this time.  Heart rate has been better controlled.    Medications: Reviewed    Review of Systems: All pertinent negative and positives as noted above.  Otherwise, all systems reviewed and found to be negative.    Telemetry: Atrial fibrillation with variable heart rates, predominantly less than 100 bpm    O:  /60 (BP Location: Right arm, Patient Position: Lying)   Pulse 86   Temp 97.4 °F (36.3 °C) (Oral)   Resp 18   Ht 188 cm (74.02\")   Wt 69.5 kg (153 lb 5 oz)   SpO2 99%   BMI 19.68 kg/m²   Temp:  [97.4 °F (36.3 °C)-99.3 °F (37.4 °C)] 97.4 °F (36.3 °C)  Heart Rate:  [] 86  Resp:  [18] 18  BP: ()/(60-70) 134/60     Intake/Output Summary (Last 24 hours) at 5/17/2019 0904  Last data filed at 5/17/2019 0734  Gross per 24 hour   Intake 480 ml   Output 2050 ml   Net -1570 ml     Gen: Chronically ill-appearing but in no acute distress  CV: Irregularly irregular  Pulm: Decreased BS at bases but otherwise CTA  GI: s, nt, +bs  Ext: 3+ edema bilaterally    Diagnostic Data:    Lab Results   Component Value Date    WBC 3.75 (L) 05/17/2019    HGB 10.8 (L) 05/17/2019    HCT 32.4 (L) 05/17/2019    MCV 99.4 (H) 05/17/2019     (L) 05/17/2019     Lab Results   Component Value Date    GLUCOSE 112 (H) 05/17/2019    CALCIUM 9.6 05/17/2019     05/17/2019    K 4.4 05/17/2019    CO2 28.0 05/17/2019    CL 99 05/17/2019    BUN 18 05/17/2019    CREATININE 0.68 05/17/2019    EGFRIFNONA 112 05/17/2019    BCR 26.5 (H) 05/17/2019    ANIONGAP 9.0 05/17/2019       ASSESSMENT/PLAN:    1.  Acute systolic and diastolic heart failure/volume overload, driven by problem #2  2.  Persistent atrial fibrillation, rapid ventricular response  3.  Non-Hodgkin's lymphoma, status post recent completion of chemotherapy  4.  Small pericardial effusion  5. "  Anemia  6.  Leukopenia  7.  Thrombocytopenia  8.  Hyponatremia -improved  9.  Elevated troponin (POA): Not consistent with acute myocardial necrosis, likely secondary to problem #1     -Continue current medications but will change back to IV diuretics to get better response as UOP has tapered on oral medication.  -Continue rate controlling medications.  -Continue Eliquis.  -Blood pressure still marginal at times, therefore no room to add further medication such as beta-blocker and ACE inhibitors at this time, even in the setting of systolic heart failure.  -No further recommendations today.  Care was discussed with the patient and his family.

## 2019-05-17 NOTE — PROGRESS NOTES
[facility] Medicine Services  INPATIENT PROGRESS NOTE    Length of Stay: 2  Date of Admission: 5/15/2019  Primary Care Physician: Woody Mathis DO    Subjective     Chief Complaint:     Shortness of breath, lower extremity edema    HPI     Patient has had very little dyspnea.  He continues to have significant edema in his lower extremities however.  The patient's rate is well controlled on oral Cardizem.  He could probably be transitioned to long-acting Cardizem at this point.  Will defer that decision to cardiology.  He has been restarted on IV diuretics because his diuresis had slowed down over the past 24 hours while on oral diuretic.  Is cheerful, interactive and talkative.    Review of Systems     All pertinent negatives and positives are as above. All other systems have been reviewed and are negative unless otherwise stated.     Objective    Temp:  [97.4 °F (36.3 °C)-98.6 °F (37 °C)] 98.3 °F (36.8 °C)  Heart Rate:  [] 98  Resp:  [18] 18  BP: ()/(60-70) 113/68    Lab Results (last 24 hours)     Procedure Component Value Units Date/Time    CBC & Differential [[id number]] Collected:  05/17/19 0459    Specimen:  Blood Updated:  05/17/19 0547    Narrative:       The following orders were created for panel order CBC & Differential.  Procedure                               Abnormality         Status                     ---------                               -----------         ------                     CBC Auto Differential[[id number]]        Abnormal            Final result                 Please view results for these tests on the individual orders.    CBC Auto Differential [[id number]]  (Abnormal) Collected:  05/17/19 0459    Specimen:  Blood Updated:  05/17/19 0547     WBC 3.75 10*3/mm3      RBC 3.26 10*6/mm3      Hemoglobin 10.8 g/dL      Hematocrit 32.4 %      MCV 99.4 fL      MCH 33.1 pg      MCHC 33.3 g/dL      RDW 19.0 %      RDW-SD 67.8 fl      MPV  11.3 fL      Platelets 120 10*3/mm3      Neutrophil % 54.2 %      Lymphocyte % 19.7 %      Monocyte % 23.5 %      Eosinophil % 1.6 %      Basophil % 0.5 %      Neutrophils, Absolute 2.03 10*3/mm3      Lymphocytes, Absolute 0.74 10*3/mm3      Monocytes, Absolute 0.88 10*3/mm3      Eosinophils, Absolute 0.06 10*3/mm3      Basophils, Absolute 0.02 10*3/mm3     Basic Metabolic Panel [[id number]]  (Abnormal) Collected:  05/17/19 0459    Specimen:  Blood Updated:  05/17/19 0525     Glucose 112 mg/dL      BUN 18 mg/dL      Creatinine 0.68 mg/dL      Sodium 136 mmol/L      Potassium 4.4 mmol/L      Chloride 99 mmol/L      CO2 28.0 mmol/L      Calcium 9.6 mg/dL      eGFR Non African Amer 112 mL/min/1.73      BUN/Creatinine Ratio 26.5     Anion Gap 9.0 mmol/L     Narrative:       GFR Normal >60  Chronic Kidney Disease <60  Kidney Failure <15          Imaging Results (last 24 hours)     ** No results found for the last 24 hours. **             Intake/Output Summary (Last 24 hours) at 5/17/2019 1354  Last data filed at 5/17/2019 0734  Gross per 24 hour   Intake 240 ml   Output 1850 ml   Net -1610 ml       Physical Exam    The patient's wife is present in the room.  Discussed with his nurse.  Constitutional: He is oriented to person, place, and time. He appears well-developed and well-nourished. He is cooperative.   HENT:   Head: Normocephalic and atraumatic.   Nose: Nose normal.   Mouth/Throat: Oropharynx is clear and moist.   Eyes: Conjunctivae are normal. No scleral icterus.   Neck: Normal range of motion. Neck supple. No JVD present.   Cardiovascular: Normal rate, regular rhythm, normal heart sounds and intact distal pulses.   No murmur heard.  Pulmonary/Chest: Effort normal and breath sounds normal. He has no wheezes.  No rales present.  Abdominal: Soft. Bowel sounds are normal. He exhibits no mass. There is no tenderness.   Musculoskeletal: Normal range of motion.  2-3/4 edema BLE  Neurological: He is alert and oriented to  person, place, and time. Coordination normal.   Skin: Skin is warm and dry. No rash noted.   Psychiatric: He has a normal mood and affect.        Results Review:  I have reviewed the labs, radiology results, and diagnostic studies since my last progress note and made treatment changes reflective of the results.   I have reviewed the current medications.    Assessment/Plan     Active Hospital Problems    Diagnosis   • **Acute systolic (congestive) heart failure (CMS/HCC)   • Pleural effusion, bilateral   • Elevated troponin   • Diffuse large B cell lymphoma (CMS/HCC)   • Lower extremity edema   • Acute pulmonary edema (CMS/HCC)   • Anemia   • Paroxysmal atrial fibrillation with rapid ventricular response (CMS/HCC)       PLAN:  Continue IV Lasix  Continue oral Cardizem 4 times daily    Stephane Ruvalcaba DO   05/17/19   1:54 PM

## 2019-05-17 NOTE — PROGRESS NOTES
Malnutrition Severity Assessment    Patient Name:  Aram Nunez  YOB: 1940  MRN: [id number]  Admit Date:  5/15/2019    Patient meets criteria for : Moderate (non-severe) Malnutrition    Comments:  If in agreement to MSA please attest.   %IBW 79 and %UBW 78. Pt has lost 41 pounds in 1 year.   Appetite is improving but not 100% per pt.  Currently drink Boost TID to keep weight on.      Thanks   Amanda Slater RDN, LD     Malnutrition Severity Assessment  Malnutrition Type: Chronic Disease - Related Malnutrition     Malnutrition Type (last 8 hours)      Malnutrition Severity Assessment     Row Name 05/17/19 1705       Malnutrition Severity Assessment    Malnutrition Type  Chronic Disease - Related Malnutrition    Row Name 05/17/19 1705       Insufficient Energy Intake     Insufficient Energy Intake   <50% of est. energy requirement for >or equal to 1 month    Row Name 05/17/19 1705       Unintentional Weight Loss     Unintentional Weight Loss   Weight loss greater than 20% in one year 41 pounds 1 year    Row Name 05/17/19 1705       Muscle Loss    Loss of Muscle Mass Findings  Moderate    [place]  Moderate - slight depression    Clavicle Bone Region  Moderate - some protrusion in females, visible in males    Scapular Bone Region  Moderate - mild depression, bones may show slightly    Row Name 05/17/19 1705       Fat Loss    Subcutaneous Fat Loss Findings  Moderate    Orbital Region   Moderate -  somewhat hollowness, slightly dark circles    Row Name 05/17/19 1705       Fluid Accumulation (Edema)    Fluid Acumulation Findings  Moderate    Fluid Accumulation   Severe equals 3+ or 4+ pitting edema    Row Name 05/17/19 1705       Criteria Met (Must meet criteria for severity in at least 2 of these categories: M Wasting, Fat Loss, Fluid, Secondary Signs, Wt. Status, Intake)    Patient meets criteria for   Moderate (non-severe) Malnutrition          Electronically signed by:  Amanda Slater,  RD  05/17/19 5:07 PM

## 2019-05-17 NOTE — PLAN OF CARE
Problem: Patient Care Overview  Goal: Plan of Care Review  Outcome: Ongoing (interventions implemented as appropriate)   05/17/19 1522   Coping/Psychosocial   Plan of Care Reviewed With patient   OTHER   Outcome Summary VSS this shift. No complaints of pain. Pt has 3+/4+ pitting edema in BLE. IV lasix restarted. Pt recieving Cardizem 90mg Q6H. Pt ambulated halls with minimal assist. Legs elevated. Safety maintained, no falls. Will cont to monitor and notify MD of any changes.    Plan of Care Review   Progress no change

## 2019-05-17 NOTE — PLAN OF CARE
Problem: Patient Care Overview  Goal: Plan of Care Review  Outcome: Ongoing (interventions implemented as appropriate)   05/17/19 0535   Coping/Psychosocial   Plan of Care Reviewed With patient;spouse   OTHER   Outcome Summary Pt had uneventful shift. No c/o pain. Discussed medications with patient, wife, and daughter. BLE 4+ pitting edema. Encouraged pt to keep legs elevated. BP low throughout shift. HR better controlled this AM. Pt receiving PO Cardizem every 6 hours. AF  on tele.   Plan of Care Review   Progress improving       Problem: Fall Risk (Adult)  Goal: Absence of Fall  Outcome: Ongoing (interventions implemented as appropriate)   05/17/19 0535   Fall Risk (Adult)   Absence of Fall achieves outcome       Problem: Cardiac Output Decreased (Adult)  Goal: Identify Related Risk Factors and Signs and Symptoms  Outcome: Outcome(s) achieved Date Met: 05/17/19 05/17/19 0535   Cardiac Output Decreased (Adult)   Related Risk Factors (Cardiac Output Decreased) contractility altered;heart failure;heart rhythm altered;heart rate altered   Signs and Symptoms (Cardiac Output Decreased) abnormal heart sounds;adventitious breath sounds;decreased ejection fraction, stroke volume;edema;heart rate/rhythm alteration;weakness/activity intolerance;weight gain     Goal: Effective Tissue Perfusion  Outcome: Ongoing (interventions implemented as appropriate)   05/17/19 0535   Cardiac Output Decreased (Adult)   Effective Tissue Perfusion making progress toward outcome

## 2019-05-18 PROBLEM — C83.30 DIFFUSE LARGE B CELL LYMPHOMA (HCC): Chronic | Status: ACTIVE | Noted: 2019-05-15

## 2019-05-18 LAB
ANION GAP SERPL CALCULATED.3IONS-SCNC: 9 MMOL/L (ref 4–13)
BASOPHILS # BLD AUTO: 0.02 10*3/MM3 (ref 0–0.2)
BASOPHILS NFR BLD AUTO: 0.5 % (ref 0–2)
BUN BLD-MCNC: 26 MG/DL (ref 5–21)
BUN/CREAT SERPL: 32.5 (ref 7–25)
CALCIUM SPEC-SCNC: 9.2 MG/DL (ref 8.4–10.4)
CHLORIDE SERPL-SCNC: 98 MMOL/L (ref 98–110)
CO2 SERPL-SCNC: 25 MMOL/L (ref 24–31)
CREAT BLD-MCNC: 0.8 MG/DL (ref 0.5–1.4)
DEPRECATED RDW RBC AUTO: 65.6 FL (ref 40–54)
EOSINOPHIL # BLD AUTO: 0.08 10*3/MM3 (ref 0–0.7)
EOSINOPHIL NFR BLD AUTO: 1.9 % (ref 0–4)
ERYTHROCYTE [DISTWIDTH] IN BLOOD BY AUTOMATED COUNT: 18.6 % (ref 12–15)
GFR SERPL CREATININE-BSD FRML MDRD: 93 ML/MIN/1.73
GLUCOSE BLD-MCNC: 108 MG/DL (ref 70–100)
HCT VFR BLD AUTO: 31.7 % (ref 40–52)
HGB BLD-MCNC: 10.6 G/DL (ref 14–18)
IMM GRANULOCYTES # BLD AUTO: 0.04 10*3/MM3 (ref 0–0.05)
IMM GRANULOCYTES NFR BLD AUTO: 1 % (ref 0–5)
LYMPHOCYTES # BLD AUTO: 0.84 10*3/MM3 (ref 0.72–4.86)
LYMPHOCYTES NFR BLD AUTO: 20.1 % (ref 15–45)
MCH RBC QN AUTO: 32.9 PG (ref 28–32)
MCHC RBC AUTO-ENTMCNC: 33.4 G/DL (ref 33–36)
MCV RBC AUTO: 98.4 FL (ref 82–95)
MONOCYTES # BLD AUTO: 1.02 10*3/MM3 (ref 0.19–1.3)
MONOCYTES NFR BLD AUTO: 24.4 % (ref 4–12)
NEUTROPHILS # BLD AUTO: 2.18 10*3/MM3 (ref 1.87–8.4)
NEUTROPHILS NFR BLD AUTO: 52.1 % (ref 39–78)
NRBC BLD AUTO-RTO: 0 /100 WBC (ref 0–0.2)
PLATELET # BLD AUTO: 124 10*3/MM3 (ref 130–400)
PMV BLD AUTO: 11.7 FL (ref 6–12)
POTASSIUM BLD-SCNC: 4.3 MMOL/L (ref 3.5–5.3)
RBC # BLD AUTO: 3.22 10*6/MM3 (ref 4.8–5.9)
SODIUM BLD-SCNC: 132 MMOL/L (ref 135–145)
WBC NRBC COR # BLD: 4.18 10*3/MM3 (ref 4.8–10.8)

## 2019-05-18 PROCEDURE — 99232 SBSQ HOSP IP/OBS MODERATE 35: CPT | Performed by: NURSE PRACTITIONER

## 2019-05-18 PROCEDURE — 85025 COMPLETE CBC W/AUTO DIFF WBC: CPT | Performed by: FAMILY MEDICINE

## 2019-05-18 PROCEDURE — 25010000002 FUROSEMIDE PER 20 MG: Performed by: INTERNAL MEDICINE

## 2019-05-18 PROCEDURE — 80048 BASIC METABOLIC PNL TOTAL CA: CPT | Performed by: FAMILY MEDICINE

## 2019-05-18 RX ADMIN — METOPROLOL TARTRATE 12.5 MG: 25 TABLET, FILM COATED ORAL at 15:17

## 2019-05-18 RX ADMIN — FUROSEMIDE 40 MG: 10 INJECTION, SOLUTION INTRAVENOUS at 20:40

## 2019-05-18 RX ADMIN — MEGESTROL ACETATE 400 MG: 40 SUSPENSION ORAL at 08:00

## 2019-05-18 RX ADMIN — GABAPENTIN 100 MG: 100 CAPSULE ORAL at 08:00

## 2019-05-18 RX ADMIN — DILTIAZEM HYDROCHLORIDE 90 MG: 90 TABLET, FILM COATED ORAL at 05:43

## 2019-05-18 RX ADMIN — SODIUM CHLORIDE, PRESERVATIVE FREE 3 ML: 5 INJECTION INTRAVENOUS at 20:40

## 2019-05-18 RX ADMIN — DILTIAZEM HYDROCHLORIDE 90 MG: 90 TABLET, FILM COATED ORAL at 12:05

## 2019-05-18 RX ADMIN — SODIUM CHLORIDE, PRESERVATIVE FREE 3 ML: 5 INJECTION INTRAVENOUS at 08:00

## 2019-05-18 RX ADMIN — PANTOPRAZOLE SODIUM 40 MG: 40 TABLET, DELAYED RELEASE ORAL at 08:00

## 2019-05-18 RX ADMIN — PANTOPRAZOLE SODIUM 40 MG: 40 TABLET, DELAYED RELEASE ORAL at 17:21

## 2019-05-18 RX ADMIN — METOPROLOL TARTRATE 12.5 MG: 25 TABLET, FILM COATED ORAL at 20:39

## 2019-05-18 RX ADMIN — DILTIAZEM HYDROCHLORIDE 90 MG: 90 TABLET, FILM COATED ORAL at 17:21

## 2019-05-18 RX ADMIN — APIXABAN 5 MG: 5 TABLET, FILM COATED ORAL at 20:39

## 2019-05-18 RX ADMIN — DIGOXIN 125 MCG: 125 TABLET ORAL at 12:05

## 2019-05-18 RX ADMIN — DILTIAZEM HYDROCHLORIDE 90 MG: 90 TABLET, FILM COATED ORAL at 23:42

## 2019-05-18 RX ADMIN — METFORMIN HYDROCHLORIDE 1000 MG: 500 TABLET ORAL at 17:21

## 2019-05-18 RX ADMIN — Medication 5.25 MG: at 20:39

## 2019-05-18 RX ADMIN — METFORMIN HYDROCHLORIDE 1000 MG: 500 TABLET ORAL at 08:00

## 2019-05-18 RX ADMIN — APIXABAN 5 MG: 5 TABLET, FILM COATED ORAL at 08:00

## 2019-05-18 RX ADMIN — GABAPENTIN 100 MG: 100 CAPSULE ORAL at 15:17

## 2019-05-18 RX ADMIN — POTASSIUM CHLORIDE 20 MEQ: 750 CAPSULE, EXTENDED RELEASE ORAL at 08:00

## 2019-05-18 RX ADMIN — FUROSEMIDE 40 MG: 10 INJECTION, SOLUTION INTRAVENOUS at 08:00

## 2019-05-18 RX ADMIN — GABAPENTIN 100 MG: 100 CAPSULE ORAL at 20:40

## 2019-05-18 NOTE — PLAN OF CARE
Problem: Patient Care Overview  Goal: Plan of Care Review  Outcome: Ongoing (interventions implemented as appropriate)   05/18/19 0597   Coping/Psychosocial   Plan of Care Reviewed With patient;spouse   OTHER   Outcome Summary Pt continues to have 4+ pitting edema in BLE. IV Lasix restarted yesterday. Good UOP. Encouraged pt to keep feet elevated, but he states that it doesn't help him. Pt continues to be in AF,  on tele. VSS.    Plan of Care Review   Progress no change       Problem: Fall Risk (Adult)  Goal: Absence of Fall  Outcome: Ongoing (interventions implemented as appropriate)   05/18/19 0534   Fall Risk (Adult)   Absence of Fall achieves outcome       Problem: Cardiac Output Decreased (Adult)  Goal: Effective Tissue Perfusion  Outcome: Ongoing (interventions implemented as appropriate)

## 2019-05-18 NOTE — PLAN OF CARE
Problem: Patient Care Overview  Goal: Plan of Care Review  Outcome: Ongoing (interventions implemented as appropriate)   05/18/19 8668   Coping/Psychosocial   Plan of Care Reviewed With patient;spouse   OTHER   Outcome Summary VSS. No c/o pain today. No SOA. Patient ambulated well in llamas today. Sats WNL on room air. BLE edema remains. Patient has feet elevated in chair most of the day. Lopressor started this afternoon. Cont to monitor.    Plan of Care Review   Progress improving       Problem: Fall Risk (Adult)  Goal: Identify Related Risk Factors and Signs and Symptoms  Outcome: Ongoing (interventions implemented as appropriate)    Goal: Absence of Fall  Outcome: Ongoing (interventions implemented as appropriate)      Problem: Cardiac Output Decreased (Adult)  Goal: Effective Tissue Perfusion  Outcome: Ongoing (interventions implemented as appropriate)

## 2019-05-18 NOTE — PROGRESS NOTES
[facility] -  Progress Note     LOS: 3 days   Patient Care Team:  Woody Mathis DO as PCP - General  Woody Mathis DO as PCP - Family Medicine  Woody Mathis DO as Referring Physician (Family Medicine)  Prisca Talley APRN as Nurse Practitioner (Otolaryngology)  Reagan Campos MD as Consulting Physician (Otolaryngology)  Chris Meyer MD as Cardiologist (Cardiology)  Lisa Walton MD as Referring Physician (Hematology and Oncology)    Chief Complaint: Fluid and Elevated HR    Subjective  Sitting up in chair. Stated fluid better in legs. HR still up.         REVIEW OF SYSTEMS:  Constitutional: Denies fever or chills. Denies change in energy level or malaise. Weak, but getting stronger.  HEENT: Denies headaches or visual disturbances. Denies difficulty swallowing or sore throat.  Respiratory: Denies cough or hoarseness. Denies shortness of breath.   Cardiovascular: Denies chest pain or pressure. + palpitations. Denies presyncope/syncope. Denies orthopnea/PND. Lower extremity swelling improving.  Gastrointestinal: Denies abdominal pain. Denies nausea/vomiting. Denies change in bowel habits or history of recent GI tract blood loss.   Genitourinary: Denies urinary urgency or frequency. Denies dysuria, hematuria.  Musculoskeletal: Denies pain or swelling in joints.  Neurological: Denies paresthesias. Denies headache. Denies seizure or stroke symptoms.  Behavioral/Psych: Denies problems with anxiety or depression.    All other systems are negative except where stated above.      Objective     Vital Sign Min/Max for last 24 hours  Temp  Min: 97.7 °F (36.5 °C)  Max: 98.7 °F (37.1 °C)   BP  Min: 98/50  Max: 113/51   Pulse  Min: 76  Max: 98   Resp  Min: 18  Max: 20   SpO2  Min: 93 %  Max: 99 %   No Data Recorded   Weight  Min: 68 kg (150 lb)  Max: 69.5 kg (153 lb 3.5 oz)         05/17/19  2243   Weight: 68 kg (150 lb)         Intake/Output Summary  (Last 24 hours) at 5/18/2019 1151  Last data filed at 5/18/2019 1105  Gross per 24 hour   Intake 240 ml   Output 4900 ml   Net -4660 ml         Physical Exam:    General Appearance:    Alert, cooperative, in no acute distress   Head:    Normocephalic, without obvious abnormality, atraumatic   Eyes:            Lids and lashes normal, conjunctivae and sclerae normal, no icterus, no pallor, corneas clear, PERRLA   Ears:    Ears appear intact with no abnormalities noted   Throat:   No oral lesions, no thrush, oral mucosa moist   Neck:   No adenopathy, supple, trachea midline, no thyromegaly, no JVD   Lungs:     Clear to auscultation, respirations regular, even and unlabored    Heart:    Irregular rhythm and normal rate, normal S1 and S2, no murmur, no gallop, no rub, no click   Abdomen:     Normal bowel sounds, no masses, no organomegaly, soft non-tender, non-distended, no guarding, no rebound tenderness   Extremities:   Moves all extremities well, 1+ bilateral ankle edema, no cyanosis, no redness   Pulses:   Pulses palpable and equal bilaterally   Skin:   No bleeding, bruising or rash   Neurologic:   Cranial nerves 2 - 12 grossly intact        Results Review:   Lab Results (last 72 hours)     Procedure Component Value Units Date/Time    CBC & Differential [[id number]] Collected:  05/18/19 0358    Specimen:  Blood Updated:  05/18/19 0443    Narrative:       The following orders were created for panel order CBC & Differential.  Procedure                               Abnormality         Status                     ---------                               -----------         ------                     CBC Auto Differential[[id number]]        Abnormal            Final result                 Please view results for these tests on the individual orders.    CBC Auto Differential [[id number]]  (Abnormal) Collected:  05/18/19 0358    Specimen:  Blood Updated:  05/18/19 0443     WBC 4.18 10*3/mm3      RBC 3.22 10*6/mm3       Hemoglobin 10.6 g/dL      Hematocrit 31.7 %      MCV 98.4 fL      MCH 32.9 pg      MCHC 33.4 g/dL      RDW 18.6 %      RDW-SD 65.6 fl      MPV 11.7 fL      Platelets 124 10*3/mm3      Neutrophil % 52.1 %      Lymphocyte % 20.1 %      Monocyte % 24.4 %      Eosinophil % 1.9 %      Basophil % 0.5 %      Immature Grans % 1.0 %      Neutrophils, Absolute 2.18 10*3/mm3      Lymphocytes, Absolute 0.84 10*3/mm3      Monocytes, Absolute 1.02 10*3/mm3      Eosinophils, Absolute 0.08 10*3/mm3      Basophils, Absolute 0.02 10*3/mm3      Immature Grans, Absolute 0.04 10*3/mm3      nRBC 0.0 /100 WBC     Basic Metabolic Panel [[id number]]  (Abnormal) Collected:  05/18/19 0358    Specimen:  Blood Updated:  05/18/19 0436     Glucose 108 mg/dL      BUN 26 mg/dL      Creatinine 0.80 mg/dL      Sodium 132 mmol/L      Potassium 4.3 mmol/L      Chloride 98 mmol/L      CO2 25.0 mmol/L      Calcium 9.2 mg/dL      eGFR Non African Amer 93 mL/min/1.73      BUN/Creatinine Ratio 32.5     Anion Gap 9.0 mmol/L     Narrative:       GFR Normal >60  Chronic Kidney Disease <60  Kidney Failure <15    CBC & Differential [[id number]] Collected:  05/17/19 0459    Specimen:  Blood Updated:  05/17/19 0547    Narrative:       The following orders were created for panel order CBC & Differential.  Procedure                               Abnormality         Status                     ---------                               -----------         ------                     CBC Auto Differential[[id number]]        Abnormal            Final result                 Please view results for these tests on the individual orders.    CBC Auto Differential [[id number]]  (Abnormal) Collected:  05/17/19 0459    Specimen:  Blood Updated:  05/17/19 0547     WBC 3.75 10*3/mm3      RBC 3.26 10*6/mm3      Hemoglobin 10.8 g/dL      Hematocrit 32.4 %      MCV 99.4 fL      MCH 33.1 pg      MCHC 33.3 g/dL      RDW 19.0 %      RDW-SD 67.8 fl      MPV 11.3 fL      Platelets 120 10*3/mm3       Neutrophil % 54.2 %      Lymphocyte % 19.7 %      Monocyte % 23.5 %      Eosinophil % 1.6 %      Basophil % 0.5 %      Neutrophils, Absolute 2.03 10*3/mm3      Lymphocytes, Absolute 0.74 10*3/mm3      Monocytes, Absolute 0.88 10*3/mm3      Eosinophils, Absolute 0.06 10*3/mm3      Basophils, Absolute 0.02 10*3/mm3     Basic Metabolic Panel [[id number]]  (Abnormal) Collected:  05/17/19 0459    Specimen:  Blood Updated:  05/17/19 0525     Glucose 112 mg/dL      BUN 18 mg/dL      Creatinine 0.68 mg/dL      Sodium 136 mmol/L      Potassium 4.4 mmol/L      Chloride 99 mmol/L      CO2 28.0 mmol/L      Calcium 9.6 mg/dL      eGFR Non African Amer 112 mL/min/1.73      BUN/Creatinine Ratio 26.5     Anion Gap 9.0 mmol/L     Narrative:       GFR Normal >60  Chronic Kidney Disease <60  Kidney Failure <15    Hemoglobin A1c [[id number]] Collected:  05/16/19 0510    Specimen:  Blood Updated:  05/16/19 0741     Hemoglobin A1C 5.8 %     Narrative:       Less than 6.0           Non-Diabetic Range  6.0-7.0                 ADA Therapeutic Target  Greater than 7.0        Action Suggested    CBC & Differential [[id number]] Collected:  05/16/19 0510    Specimen:  Blood Updated:  05/16/19 0603    Narrative:       The following orders were created for panel order CBC & Differential.  Procedure                               Abnormality         Status                     ---------                               -----------         ------                     CBC Auto Differential[[id number]]        Abnormal            Final result                 Please view results for these tests on the individual orders.    CBC Auto Differential [[id number]]  (Abnormal) Collected:  05/16/19 0510    Specimen:  Blood Updated:  05/16/19 0603     WBC 2.84 10*3/mm3      RBC 3.02 10*6/mm3      Hemoglobin 10.0 g/dL      Hematocrit 29.5 %      MCV 97.7 fL      MCH 33.1 pg      MCHC 33.9 g/dL      RDW 18.8 %      RDW-SD 66.2 fl      MPV 11.7 fL      Platelets 92  10*3/mm3     Manual Differential [[id number]]  (Abnormal) Collected:  05/16/19 0510    Specimen:  Blood Updated:  05/16/19 0603     Neutrophil % 70.8 %      Lymphocyte % 11.5 %      Monocyte % 10.4 %      Eosinophil % 2.1 %      Basophil % 2.1 %      Atypical Lymphocyte % 3.1 %      Neutrophils Absolute 2.01 10*3/mm3      Lymphocytes Absolute 0.33 10*3/mm3      Monocytes Absolute 0.30 10*3/mm3      Eosinophils Absolute 0.06 10*3/mm3      Basophils Absolute 0.06 10*3/mm3      nRBC 5.2 /100 WBC      Anisocytosis Slight/1+     Macrocytes Slight/1+     Poikilocytes Slight/1+     WBC Morphology Normal     Platelet Estimate Decreased     Giant Platelets Slight/1+    Lipid Panel [[id number]]  (Abnormal) Collected:  05/16/19 0510    Specimen:  Blood Updated:  05/16/19 0602     Total Cholesterol 123 mg/dL      Triglycerides 104 mg/dL      HDL Cholesterol 33 mg/dL      LDL Cholesterol  81 mg/dL      LDL/HDL Ratio 2.10    Basic Metabolic Panel [[id number]]  (Abnormal) Collected:  05/16/19 0510    Specimen:  Blood Updated:  05/16/19 0553     Glucose 105 mg/dL      BUN 20 mg/dL      Creatinine 0.74 mg/dL      Sodium 136 mmol/L      Potassium 4.0 mmol/L      Chloride 100 mmol/L      CO2 29.0 mmol/L      Calcium 9.4 mg/dL      eGFR Non African Amer 102 mL/min/1.73      BUN/Creatinine Ratio 27.0     Anion Gap 7.0 mmol/L     Narrative:       GFR Normal >60  Chronic Kidney Disease <60  Kidney Failure <15              2D Echocardiogram:  · Left ventricular systolic function is mildly decreased. Estimated EF appears to be in the range of 41 - 45%.  · Mild mitral valve regurgitation is present  · Trace tricuspid valve regurgitation is present. Estimated right ventricular systolic pressure from tricuspid regurgitation is normal (<35 mmHg).  · There is a small (<1cm) pericardial effusion.        Medication Review: yes  Current Facility-Administered Medications   Medication Dose Route Frequency Provider Last Rate Last Dose   • acetaminophen  (TYLENOL) tablet 650 mg  650 mg Oral Q4H PRN Marylin Posey MD       • apixaban (ELIQUIS) tablet 5 mg  5 mg Oral Q12H Knees, Perri E, APRN   5 mg at 05/18/19 0800   • digoxin (LANOXIN) tablet 125 mcg  125 mcg Oral Daily Knees, Perri E, APRN   125 mcg at 05/17/19 1301   • diltiaZEM (CARDIZEM) 125 mg in sodium chloride 0.9 % 125 mL (1 mg/mL) infusion  5-15 mg/hr Intravenous Titrated Devan Ingram MD   Stopped at 05/16/19 1344   • diltiaZEM (CARDIZEM) tablet 90 mg  90 mg Oral Q6H Jared Ledesma APRN   90 mg at 05/18/19 0543   • furosemide (LASIX) injection 40 mg  40 mg Intravenous Q12H Chris Meyer MD   40 mg at 05/18/19 0800   • gabapentin (NEURONTIN) capsule 100 mg  100 mg Oral TID Marylin Posey MD   100 mg at 05/18/19 0800   • HYDROcodone-acetaminophen (NORCO) 7.5-325 MG per tablet 1 tablet  1 tablet Oral Q8H PRN Marylin Posey MD       • megestrol (MEGACE) 40 MG/ML suspension 400 mg  400 mg Oral Daily Marylin Posey MD   400 mg at 05/18/19 0800   • melatonin tablet 5.25 mg  5.25 mg Oral Nightly Marylin Posey MD   5.25 mg at 05/17/19 2033   • metFORMIN (GLUCOPHAGE) tablet 1,000 mg  1,000 mg Oral BID With Meals Stephane Ruvalcaba DO   1,000 mg at 05/18/19 0800   • nitroglycerin (NITROSTAT) SL tablet 0.4 mg  0.4 mg Sublingual Q5 Min PRN Marylin Posey MD       • ondansetron (ZOFRAN) injection 4 mg  4 mg Intravenous Q6H PRN Marylin Posey MD       • pantoprazole (PROTONIX) EC tablet 40 mg  40 mg Oral BID Marylin Posey MD   40 mg at 05/18/19 0800   • phenylephrine-mineral oil-petrolatum (PREPARATION H) 0.25-14-74.9 % hemorhoidal ointment   Rectal TID PRN Chris Meyer MD       • polyethylene glycol (MIRALAX) powder 17 g  17 g Oral Daily PRN Marylin Posey MD       • potassium chloride (MICRO-K) CR capsule 20 mEq  20 mEq Oral Daily Stephane Ruvalcaba DO   20 mEq at 05/18/19 0800   • sodium chloride 0.9 % flush 3 mL  3 mL Intravenous Q12H  Marylin Posey MD   3 mL at 05/18/19 0800   • sodium chloride 0.9 % flush 3-10 mL  3-10 mL Intravenous PRN Marylin Posey MD             Assessment/Plan       Paroxysmal atrial fibrillation with rapid ventricular response (CMS/HCC) - on Cardizem 90 mg every 6 hours and lanoxin. Anticoagulated on Eliquis. Telemetry - .     Acute systolic (congestive) heart failure (CMS/HCC) - Improving. On diuretic. Will try low dose BB.     Diffuse large B cell lymphoma (CMS/HCC) - Noted      Will add low dose BB. Monitor BP.       Tiffanie Chadwick, DERREK  05/18/19  11:51 AM

## 2019-05-18 NOTE — PROGRESS NOTES
[facility]  INPATIENT PROGRESS NOTE    Length of Stay: 3  Date of Admission: 5/15/2019  Primary Care Physician: Woody Mathis DO    Subjective     Chief Complaint:     Shortness of breath, lower extremity edema    HPI     Patient continues to diurese well.  Output 12,500 cc out greater than intake.  IV diuretics continue per cardiology.  The patient states that his swelling is significantly reduced.  He can actually see wrinkles in his legs and feet now.  His ankles remain edematous.  He is able to ambulate in his room without dyspnea.  Low-dose beta-blocker was added by cardiology in order to improve rate control.  He remains on every 6 hours Cardizem.  CBC shows a white count of 4200, hemoglobin 6.6, platelets 124,000.  BMP shows creatinine is well-maintained at 0.80 with a BUN of 26.    Review of Systems     All pertinent negatives and positives are as above. All other systems have been reviewed and are negative unless otherwise stated.     Objective    Temp:  [97.7 °F (36.5 °C)-98.7 °F (37.1 °C)] 98.1 °F (36.7 °C)  Heart Rate:  [63-98] 63  Resp:  [18-20] 18  BP: ()/(49-55) 108/53    Lab Results (last 24 hours)     Procedure Component Value Units Date/Time    CBC & Differential [[id number]] Collected:  05/18/19 0358    Specimen:  Blood Updated:  05/18/19 0443    Narrative:       The following orders were created for panel order CBC & Differential.  Procedure                               Abnormality         Status                     ---------                               -----------         ------                     CBC Auto Differential[[id number]]        Abnormal            Final result                 Please view results for these tests on the individual orders.    CBC Auto Differential [[id number]]  (Abnormal) Collected:  05/18/19 0358    Specimen:  Blood Updated:  05/18/19 0443     WBC 4.18 10*3/mm3      RBC 3.22 10*6/mm3      Hemoglobin  10.6 g/dL      Hematocrit 31.7 %      MCV 98.4 fL      MCH 32.9 pg      MCHC 33.4 g/dL      RDW 18.6 %      RDW-SD 65.6 fl      MPV 11.7 fL      Platelets 124 10*3/mm3      Neutrophil % 52.1 %      Lymphocyte % 20.1 %      Monocyte % 24.4 %      Eosinophil % 1.9 %      Basophil % 0.5 %      Immature Grans % 1.0 %      Neutrophils, Absolute 2.18 10*3/mm3      Lymphocytes, Absolute 0.84 10*3/mm3      Monocytes, Absolute 1.02 10*3/mm3      Eosinophils, Absolute 0.08 10*3/mm3      Basophils, Absolute 0.02 10*3/mm3      Immature Grans, Absolute 0.04 10*3/mm3      nRBC 0.0 /100 WBC     Basic Metabolic Panel [[id number]]  (Abnormal) Collected:  05/18/19 0358    Specimen:  Blood Updated:  05/18/19 0436     Glucose 108 mg/dL      BUN 26 mg/dL      Creatinine 0.80 mg/dL      Sodium 132 mmol/L      Potassium 4.3 mmol/L      Chloride 98 mmol/L      CO2 25.0 mmol/L      Calcium 9.2 mg/dL      eGFR Non African Amer 93 mL/min/1.73      BUN/Creatinine Ratio 32.5     Anion Gap 9.0 mmol/L     Narrative:       GFR Normal >60  Chronic Kidney Disease <60  Kidney Failure <15          Imaging Results (last 24 hours)     ** No results found for the last 24 hours. **             Intake/Output Summary (Last 24 hours) at 5/18/2019 1606  Last data filed at 5/18/2019 1435  Gross per 24 hour   Intake 480 ml   Output 4000 ml   Net -3520 ml       Physical Exam  No family is present in the room.  Constitutional: He is oriented to person, place, and time. He appears well-developed and well-nourished. He is cooperative.   HENT:   Head: Normocephalic and atraumatic.   Nose: Nose normal.   Mouth/Throat: Oropharynx is clear and moist.   Eyes: Conjunctivae are normal. No scleral icterus.   Neck: Normal range of motion. Neck supple. No JVD present.   Cardiovascular: Normal rate, regular rhythm, normal heart sounds and intact distal pulses.   No murmur heard.  Pulmonary/Chest: Effort normal and breath sounds normal. He has no wheezes.  No rales  present.  Abdominal: Soft. Bowel sounds are normal. He exhibits no mass. There is no tenderness.   Musculoskeletal: Normal range of motion.  1/4 edema BLE of ankles and forefoot.  Neurological: He is alert and oriented to person, place, and time. Coordination normal.   Skin: Skin is warm and dry. No rash noted.   Psychiatric: He has a normal mood and affect.        Results Review:  I have reviewed the labs, radiology results, and diagnostic studies since my last progress note and made treatment changes reflective of the results.   I have reviewed the current medications.    Assessment/Plan     Active Hospital Problems    Diagnosis   • **Paroxysmal atrial fibrillation with rapid ventricular response (CMS/HCC)   • Acute systolic (congestive) heart failure (CMS/HCC)   • Pleural effusion, bilateral   • Elevated troponin   • Diffuse large B cell lymphoma (CMS/HCC)   • Lower extremity edema   • Acute pulmonary edema (CMS/HCC)   • Anemia       PLAN:  Continue diuresis per cardiology  Continue oral Cardizem  Continue beta-blockers per cardiology    Stephane Ruvalcaba DO   05/18/19   4:06 PM

## 2019-05-19 LAB
ANION GAP SERPL CALCULATED.3IONS-SCNC: 8 MMOL/L (ref 4–13)
ANISOCYTOSIS BLD QL: ABNORMAL
BUN BLD-MCNC: 29 MG/DL (ref 5–21)
BUN/CREAT SERPL: 35.8 (ref 7–25)
CALCIUM SPEC-SCNC: 9 MG/DL (ref 8.4–10.4)
CHLORIDE SERPL-SCNC: 96 MMOL/L (ref 98–110)
CO2 SERPL-SCNC: 27 MMOL/L (ref 24–31)
CREAT BLD-MCNC: 0.81 MG/DL (ref 0.5–1.4)
DEPRECATED RDW RBC AUTO: 62.7 FL (ref 40–54)
ERYTHROCYTE [DISTWIDTH] IN BLOOD BY AUTOMATED COUNT: 18.2 % (ref 12–15)
GFR SERPL CREATININE-BSD FRML MDRD: 92 ML/MIN/1.73
GLUCOSE BLD-MCNC: 97 MG/DL (ref 70–100)
HCT VFR BLD AUTO: 32.1 % (ref 40–52)
HGB BLD-MCNC: 10.9 G/DL (ref 14–18)
LYMPHOCYTES # BLD MANUAL: 0.69 10*3/MM3 (ref 0.72–4.86)
LYMPHOCYTES NFR BLD MANUAL: 13 % (ref 4–12)
LYMPHOCYTES NFR BLD MANUAL: 15 % (ref 15–45)
MCH RBC QN AUTO: 32.5 PG (ref 28–32)
MCHC RBC AUTO-ENTMCNC: 34 G/DL (ref 33–36)
MCV RBC AUTO: 95.8 FL (ref 82–95)
MONOCYTES # BLD AUTO: 0.6 10*3/MM3 (ref 0.19–1.3)
NEUTROPHILS # BLD AUTO: 3.27 10*3/MM3 (ref 1.87–8.4)
NEUTROPHILS NFR BLD MANUAL: 71 % (ref 39–78)
PLATELET # BLD AUTO: 138 10*3/MM3 (ref 130–400)
PMV BLD AUTO: 12.1 FL (ref 6–12)
POTASSIUM BLD-SCNC: 4.1 MMOL/L (ref 3.5–5.3)
RBC # BLD AUTO: 3.35 10*6/MM3 (ref 4.8–5.9)
SMALL PLATELETS BLD QL SMEAR: ADEQUATE
SODIUM BLD-SCNC: 131 MMOL/L (ref 135–145)
VARIANT LYMPHS NFR BLD MANUAL: 1 % (ref 0–5)
WBC MORPH BLD: NORMAL
WBC NRBC COR # BLD: 4.6 10*3/MM3 (ref 4.8–10.8)

## 2019-05-19 PROCEDURE — 80048 BASIC METABOLIC PNL TOTAL CA: CPT | Performed by: FAMILY MEDICINE

## 2019-05-19 PROCEDURE — 85007 BL SMEAR W/DIFF WBC COUNT: CPT | Performed by: FAMILY MEDICINE

## 2019-05-19 PROCEDURE — 85025 COMPLETE CBC W/AUTO DIFF WBC: CPT | Performed by: FAMILY MEDICINE

## 2019-05-19 PROCEDURE — 25010000002 FUROSEMIDE PER 20 MG: Performed by: INTERNAL MEDICINE

## 2019-05-19 RX ORDER — FUROSEMIDE 40 MG/1
40 TABLET ORAL DAILY
Status: DISCONTINUED | OUTPATIENT
Start: 2019-05-20 | End: 2019-05-20 | Stop reason: HOSPADM

## 2019-05-19 RX ORDER — DILTIAZEM HYDROCHLORIDE 180 MG/1
360 CAPSULE, COATED, EXTENDED RELEASE ORAL
Status: DISCONTINUED | OUTPATIENT
Start: 2019-05-19 | End: 2019-05-20 | Stop reason: HOSPADM

## 2019-05-19 RX ADMIN — APIXABAN 5 MG: 5 TABLET, FILM COATED ORAL at 21:46

## 2019-05-19 RX ADMIN — SODIUM CHLORIDE, PRESERVATIVE FREE 3 ML: 5 INJECTION INTRAVENOUS at 08:06

## 2019-05-19 RX ADMIN — GABAPENTIN 100 MG: 100 CAPSULE ORAL at 17:03

## 2019-05-19 RX ADMIN — DILTIAZEM HYDROCHLORIDE 90 MG: 90 TABLET, FILM COATED ORAL at 06:08

## 2019-05-19 RX ADMIN — SODIUM CHLORIDE, PRESERVATIVE FREE 3 ML: 5 INJECTION INTRAVENOUS at 21:47

## 2019-05-19 RX ADMIN — DIGOXIN 125 MCG: 125 TABLET ORAL at 12:23

## 2019-05-19 RX ADMIN — METOPROLOL TARTRATE 12.5 MG: 25 TABLET, FILM COATED ORAL at 21:47

## 2019-05-19 RX ADMIN — PANTOPRAZOLE SODIUM 40 MG: 40 TABLET, DELAYED RELEASE ORAL at 17:03

## 2019-05-19 RX ADMIN — GABAPENTIN 100 MG: 100 CAPSULE ORAL at 08:06

## 2019-05-19 RX ADMIN — Medication 5.25 MG: at 21:47

## 2019-05-19 RX ADMIN — DILTIAZEM HYDROCHLORIDE 360 MG: 180 CAPSULE, COATED, EXTENDED RELEASE ORAL at 21:46

## 2019-05-19 RX ADMIN — METOPROLOL TARTRATE 12.5 MG: 25 TABLET, FILM COATED ORAL at 08:05

## 2019-05-19 RX ADMIN — FUROSEMIDE 40 MG: 10 INJECTION, SOLUTION INTRAVENOUS at 08:05

## 2019-05-19 RX ADMIN — MEGESTROL ACETATE 400 MG: 40 SUSPENSION ORAL at 08:05

## 2019-05-19 RX ADMIN — METFORMIN HYDROCHLORIDE 1000 MG: 500 TABLET ORAL at 08:05

## 2019-05-19 RX ADMIN — METFORMIN HYDROCHLORIDE 1000 MG: 500 TABLET ORAL at 17:03

## 2019-05-19 RX ADMIN — APIXABAN 5 MG: 5 TABLET, FILM COATED ORAL at 08:06

## 2019-05-19 RX ADMIN — POTASSIUM CHLORIDE 20 MEQ: 750 CAPSULE, EXTENDED RELEASE ORAL at 08:06

## 2019-05-19 RX ADMIN — GABAPENTIN 100 MG: 100 CAPSULE ORAL at 21:46

## 2019-05-19 RX ADMIN — PANTOPRAZOLE SODIUM 40 MG: 40 TABLET, DELAYED RELEASE ORAL at 08:06

## 2019-05-19 RX ADMIN — DILTIAZEM HYDROCHLORIDE 90 MG: 90 TABLET, FILM COATED ORAL at 12:23

## 2019-05-19 NOTE — PLAN OF CARE
Problem: Patient Care Overview  Goal: Plan of Care Review  Outcome: Ongoing (interventions implemented as appropriate)   05/19/19 0642   Coping/Psychosocial   Plan of Care Reviewed With patient   OTHER   Outcome Summary VSS, no c/o pain or SOA, 02 sats WNL on RA, edema to bilateral lower extremities, up with standby assist and walker, good output this shift, afib 82-92 with coup, pvc.   Plan of Care Review   Progress improving       Problem: Fall Risk (Adult)  Goal: Absence of Fall  Outcome: Ongoing (interventions implemented as appropriate)      Problem: Cardiac Output Decreased (Adult)  Goal: Effective Tissue Perfusion  Outcome: Ongoing (interventions implemented as appropriate)

## 2019-05-19 NOTE — PLAN OF CARE
Problem: Patient Care Overview  Goal: Plan of Care Review  Outcome: Ongoing (interventions implemented as appropriate)   05/19/19 0867   Coping/Psychosocial   Plan of Care Reviewed With patient;spouse   OTHER   Outcome Summary VSS. No c/o pain today. IV lasix will be transition to PO tomorrow. Good urine output today. O2 sats remain WNL on room air. Continues to ambulate in llamas well. Edema is improving. Cont to monitor    Plan of Care Review   Progress improving       Problem: Fall Risk (Adult)  Goal: Identify Related Risk Factors and Signs and Symptoms  Outcome: Ongoing (interventions implemented as appropriate)      Problem: Cardiac Output Decreased (Adult)  Goal: Effective Tissue Perfusion  Outcome: Ongoing (interventions implemented as appropriate)

## 2019-05-19 NOTE — PROGRESS NOTES
Continued Stay Note  BENNIE Kumari     Patient Name: Aram Nunez  MRN: [id number]  Today's Date: 5/19/2019    Admit Date: 5/15/2019    Discharge Plan     Row Name 05/19/19 1008       Plan    Plan  Home    Patient/Family in Agreement with Plan  yes    Plan Comments  Chart reviewed; events noted.  DC plan continues to be to dc home with spouse.  SW will continue to follow for any arising dc needs.  [facility].         Discharge Codes    No documentation.             [facility]

## 2019-05-19 NOTE — PROGRESS NOTES
"[facility] -  Progress Note     LOS: 4 days   Patient Care Team:  Woody Mathis DO as PCP - General  Woody Mathis DO as PCP - Family Medicine  Woody Mathis DO as Referring Physician (Family Medicine)  Prisca Talley APRN as Nurse Practitioner (Otolaryngology)  Reagan Campos MD as Consulting Physician (Otolaryngology)  Chris Meyer MD as Cardiologist (Cardiology)  Lisa Walton MD as Referring Physician (Hematology and Oncology)    Chief Complaint: Fluid and Elevated HR    Subjective  Sitting up in chair. Stated he just finished walking down the halls.         REVIEW OF SYSTEMS:  Constitutional: Denies fever or chills. Denies change in energy level or malaise. Feeling much better today.  HEENT: Denies headaches or visual disturbances. Denies difficulty swallowing or sore throat.  Respiratory: Denies cough or hoarseness. Denies shortness of breath.   Cardiovascular: Denies chest pain or pressure. Denies palpitations. Denies presyncope/syncope. Denies orthopnea/PND. Lower extremity swelling improving, \"just in my ankles\".  Gastrointestinal: Denies abdominal pain. Denies nausea/vomiting. Denies change in bowel habits or history of recent GI tract blood loss.   Genitourinary: Denies urinary urgency or frequency. Denies dysuria, hematuria.  Musculoskeletal: Denies pain or swelling in joints.  Neurological: Denies paresthesias. Denies headache. Denies seizure or stroke symptoms.  Behavioral/Psych: Denies problems with anxiety or depression.    All other systems are negative except where stated above.      Objective     Vital Sign Min/Max for last 24 hours  Temp  Min: 97.5 °F (36.4 °C)  Max: 99.7 °F (37.6 °C)   BP  Min: 93/49  Max: 113/69   Pulse  Min: 63  Max: 97   Resp  Min: 18  Max: 18   SpO2  Min: 96 %  Max: 100 %   No Data Recorded   Weight  Min: 68.2 kg (150 lb 7 oz)  Max: 68.2 kg (150 lb 7 oz)         05/18/19 2008   Weight: 68.2 kg " (150 lb 7 oz)         Intake/Output Summary (Last 24 hours) at 5/19/2019 0920  Last data filed at 5/19/2019 0809  Gross per 24 hour   Intake 1200 ml   Output 3500 ml   Net -2300 ml         Physical Exam:    General Appearance:    Alert, cooperative, in no acute distress   Head:    Normocephalic, without obvious abnormality, atraumatic   Eyes:            Lids and lashes normal, conjunctivae and sclerae normal, no icterus, no pallor, corneas clear, PERRLA   Ears:    Ears appear intact with no abnormalities noted   Throat:   No oral lesions, no thrush, oral mucosa moist   Neck:   No adenopathy, supple, trachea midline, no thyromegaly, no JVD   Lungs:     Clear to auscultation, respirations regular, even and unlabored    Heart:    Irregular rhythm and normal rate, normal S1 and S2, no murmur, no gallop, no rub, no click   Abdomen:     Normal bowel sounds, no masses, no organomegaly, soft non-tender, non-distended, no guarding, no rebound tenderness   Extremities:   Moves all extremities well, 1+ bilateral ankle edema, no cyanosis, no redness   Pulses:   Pulses palpable and equal bilaterally   Skin:   No bleeding, bruising or rash   Neurologic:   Cranial nerves 2 - 12 grossly intact        Results Review:   Lab Results (last 72 hours)     Procedure Component Value Units Date/Time    CBC & Differential [[id number]] Collected:  05/18/19 0358    Specimen:  Blood Updated:  05/18/19 0443    Narrative:       The following orders were created for panel order CBC & Differential.  Procedure                               Abnormality         Status                     ---------                               -----------         ------                     CBC Auto Differential[[id number]]        Abnormal            Final result                 Please view results for these tests on the individual orders.    CBC Auto Differential [[id number]]  (Abnormal) Collected:  05/18/19 0358    Specimen:  Blood Updated:  05/18/19 0443     WBC 4.18  10*3/mm3      RBC 3.22 10*6/mm3      Hemoglobin 10.6 g/dL      Hematocrit 31.7 %      MCV 98.4 fL      MCH 32.9 pg      MCHC 33.4 g/dL      RDW 18.6 %      RDW-SD 65.6 fl      MPV 11.7 fL      Platelets 124 10*3/mm3      Neutrophil % 52.1 %      Lymphocyte % 20.1 %      Monocyte % 24.4 %      Eosinophil % 1.9 %      Basophil % 0.5 %      Immature Grans % 1.0 %      Neutrophils, Absolute 2.18 10*3/mm3      Lymphocytes, Absolute 0.84 10*3/mm3      Monocytes, Absolute 1.02 10*3/mm3      Eosinophils, Absolute 0.08 10*3/mm3      Basophils, Absolute 0.02 10*3/mm3      Immature Grans, Absolute 0.04 10*3/mm3      nRBC 0.0 /100 WBC     Basic Metabolic Panel [[id number]]  (Abnormal) Collected:  05/18/19 0358    Specimen:  Blood Updated:  05/18/19 0436     Glucose 108 mg/dL      BUN 26 mg/dL      Creatinine 0.80 mg/dL      Sodium 132 mmol/L      Potassium 4.3 mmol/L      Chloride 98 mmol/L      CO2 25.0 mmol/L      Calcium 9.2 mg/dL      eGFR Non African Amer 93 mL/min/1.73      BUN/Creatinine Ratio 32.5     Anion Gap 9.0 mmol/L     Narrative:       GFR Normal >60  Chronic Kidney Disease <60  Kidney Failure <15    CBC & Differential [[id number]] Collected:  05/17/19 0459    Specimen:  Blood Updated:  05/17/19 0547    Narrative:       The following orders were created for panel order CBC & Differential.  Procedure                               Abnormality         Status                     ---------                               -----------         ------                     CBC Auto Differential[[id number]]        Abnormal            Final result                 Please view results for these tests on the individual orders.    CBC Auto Differential [[id number]]  (Abnormal) Collected:  05/17/19 0459    Specimen:  Blood Updated:  05/17/19 0547     WBC 3.75 10*3/mm3      RBC 3.26 10*6/mm3      Hemoglobin 10.8 g/dL      Hematocrit 32.4 %      MCV 99.4 fL      MCH 33.1 pg      MCHC 33.3 g/dL      RDW 19.0 %      RDW-SD 67.8 fl      MPV  11.3 fL      Platelets 120 10*3/mm3      Neutrophil % 54.2 %      Lymphocyte % 19.7 %      Monocyte % 23.5 %      Eosinophil % 1.6 %      Basophil % 0.5 %      Neutrophils, Absolute 2.03 10*3/mm3      Lymphocytes, Absolute 0.74 10*3/mm3      Monocytes, Absolute 0.88 10*3/mm3      Eosinophils, Absolute 0.06 10*3/mm3      Basophils, Absolute 0.02 10*3/mm3     Basic Metabolic Panel [[id number]]  (Abnormal) Collected:  05/17/19 0459    Specimen:  Blood Updated:  05/17/19 0525     Glucose 112 mg/dL      BUN 18 mg/dL      Creatinine 0.68 mg/dL      Sodium 136 mmol/L      Potassium 4.4 mmol/L      Chloride 99 mmol/L      CO2 28.0 mmol/L      Calcium 9.6 mg/dL      eGFR Non African Amer 112 mL/min/1.73      BUN/Creatinine Ratio 26.5     Anion Gap 9.0 mmol/L     Narrative:       GFR Normal >60  Chronic Kidney Disease <60  Kidney Failure <15    Hemoglobin A1c [[id number]] Collected:  05/16/19 0510    Specimen:  Blood Updated:  05/16/19 0741     Hemoglobin A1C 5.8 %     Narrative:       Less than 6.0           Non-Diabetic Range  6.0-7.0                 ADA Therapeutic Target  Greater than 7.0        Action Suggested    CBC & Differential [[id number]] Collected:  05/16/19 0510    Specimen:  Blood Updated:  05/16/19 0603    Narrative:       The following orders were created for panel order CBC & Differential.  Procedure                               Abnormality         Status                     ---------                               -----------         ------                     CBC Auto Differential[[id number]]        Abnormal            Final result                 Please view results for these tests on the individual orders.    CBC Auto Differential [[id number]]  (Abnormal) Collected:  05/16/19 0510    Specimen:  Blood Updated:  05/16/19 0603     WBC 2.84 10*3/mm3      RBC 3.02 10*6/mm3      Hemoglobin 10.0 g/dL      Hematocrit 29.5 %      MCV 97.7 fL      MCH 33.1 pg      MCHC 33.9 g/dL      RDW 18.8 %      RDW-SD 66.2 fl       MPV 11.7 fL      Platelets 92 10*3/mm3     Manual Differential [[id number]]  (Abnormal) Collected:  05/16/19 0510    Specimen:  Blood Updated:  05/16/19 0603     Neutrophil % 70.8 %      Lymphocyte % 11.5 %      Monocyte % 10.4 %      Eosinophil % 2.1 %      Basophil % 2.1 %      Atypical Lymphocyte % 3.1 %      Neutrophils Absolute 2.01 10*3/mm3      Lymphocytes Absolute 0.33 10*3/mm3      Monocytes Absolute 0.30 10*3/mm3      Eosinophils Absolute 0.06 10*3/mm3      Basophils Absolute 0.06 10*3/mm3      nRBC 5.2 /100 WBC      Anisocytosis Slight/1+     Macrocytes Slight/1+     Poikilocytes Slight/1+     WBC Morphology Normal     Platelet Estimate Decreased     Giant Platelets Slight/1+    Lipid Panel [[id number]]  (Abnormal) Collected:  05/16/19 0510    Specimen:  Blood Updated:  05/16/19 0602     Total Cholesterol 123 mg/dL      Triglycerides 104 mg/dL      HDL Cholesterol 33 mg/dL      LDL Cholesterol  81 mg/dL      LDL/HDL Ratio 2.10    Basic Metabolic Panel [[id number]]  (Abnormal) Collected:  05/16/19 0510    Specimen:  Blood Updated:  05/16/19 0553     Glucose 105 mg/dL      BUN 20 mg/dL      Creatinine 0.74 mg/dL      Sodium 136 mmol/L      Potassium 4.0 mmol/L      Chloride 100 mmol/L      CO2 29.0 mmol/L      Calcium 9.4 mg/dL      eGFR Non African Amer 102 mL/min/1.73      BUN/Creatinine Ratio 27.0     Anion Gap 7.0 mmol/L     Narrative:       GFR Normal >60  Chronic Kidney Disease <60  Kidney Failure <15              2D Echocardiogram:  · Left ventricular systolic function is mildly decreased. Estimated EF appears to be in the range of 41 - 45%.  · Mild mitral valve regurgitation is present  · Trace tricuspid valve regurgitation is present. Estimated right ventricular systolic pressure from tricuspid regurgitation is normal (<35 mmHg).  · There is a small (<1cm) pericardial effusion.        Medication Review: yes  Current Facility-Administered Medications   Medication Dose Route Frequency Provider Last Rate  Last Dose   • acetaminophen (TYLENOL) tablet 650 mg  650 mg Oral Q4H PRN Marylin Posey MD       • apixaban (ELIQUIS) tablet 5 mg  5 mg Oral Q12H Knees, Perri E, APRN   5 mg at 05/19/19 0806   • digoxin (LANOXIN) tablet 125 mcg  125 mcg Oral Daily Knees, Perri E, APRN   125 mcg at 05/18/19 1205   • diltiaZEM (CARDIZEM) 125 mg in sodium chloride 0.9 % 125 mL (1 mg/mL) infusion  5-15 mg/hr Intravenous Titrated Devan Ingram MD   Stopped at 05/16/19 1344   • diltiaZEM (CARDIZEM) tablet 90 mg  90 mg Oral Q6H Jared Ledesma APRN   90 mg at 05/19/19 0608   • furosemide (LASIX) injection 40 mg  40 mg Intravenous Q12H Chris Meyer MD   40 mg at 05/19/19 0805   • gabapentin (NEURONTIN) capsule 100 mg  100 mg Oral TID Marylin Posey MD   100 mg at 05/19/19 0806   • HYDROcodone-acetaminophen (NORCO) 7.5-325 MG per tablet 1 tablet  1 tablet Oral Q8H PRN Marylin Posey MD       • megestrol (MEGACE) 40 MG/ML suspension 400 mg  400 mg Oral Daily Marylin Posey MD   400 mg at 05/19/19 0805   • melatonin tablet 5.25 mg  5.25 mg Oral Nightly Marylin Posey MD   5.25 mg at 05/18/19 2039   • metFORMIN (GLUCOPHAGE) tablet 1,000 mg  1,000 mg Oral BID With Meals Stephane Ruvalcaba DO   1,000 mg at 05/19/19 0805   • metoprolol tartrate (LOPRESSOR) tablet 12.5 mg  12.5 mg Oral Q12H Tiffanie Chadwick APRN   12.5 mg at 05/19/19 0805   • nitroglycerin (NITROSTAT) SL tablet 0.4 mg  0.4 mg Sublingual Q5 Min PRN Marylin Posey MD       • ondansetron (ZOFRAN) injection 4 mg  4 mg Intravenous Q6H PRN Marylin Posey MD       • pantoprazole (PROTONIX) EC tablet 40 mg  40 mg Oral BID Marylin Posey MD   40 mg at 05/19/19 0806   • phenylephrine-mineral oil-petrolatum (PREPARATION H) 0.25-14-74.9 % hemorhoidal ointment   Rectal TID PRN Chris Meyer MD       • polyethylene glycol (MIRALAX) powder 17 g  17 g Oral Daily PRN Marylin Posey MD       • potassium chloride (MICRO-K) CR  capsule 20 mEq  20 mEq Oral Daily Stephane Ruvalcaba, DO   20 mEq at 05/19/19 0806   • sodium chloride 0.9 % flush 3 mL  3 mL Intravenous Q12H Marylin Posey MD   3 mL at 05/19/19 0806   • sodium chloride 0.9 % flush 3-10 mL  3-10 mL Intravenous PRN Marylin Posey MD             Assessment/Plan       Paroxysmal atrial fibrillation with rapid ventricular response (CMS/HCC) - rate controlled on Cardizem 90 mg every 6 hours, BB, and lanoxin. Anticoagulated on Eliquis. Telemetry - afib 92-99.     Acute systolic (congestive) heart failure (CMS/HCC) - Euvolemic. On diuretic, BB.     Diffuse large B cell lymphoma (CMS/HCC) - Noted    Afib controlled. BP stable. Continue to monitor.       DERREK Martins  05/19/19  9:23 AM

## 2019-05-19 NOTE — THERAPY DISCHARGE NOTE
Acute Care - Physical Therapy Progress Note/Discharge  [facility]     Patient Name: Aram Nunez  : 1940  MRN: [id number]  Today's Date: 2019  Onset of Illness/Injury or Date of Surgery: 05/15/19  Date of Referral to PT: 05/15/19  Referring Physician: Dr. Ruvalcaba    Admit Date: 5/15/2019    Visit Dx:    ICD-10-CM ICD-9-CM   1. Acute congestive heart failure, unspecified heart failure type (CMS/HCC) I50.9 428.0   2. Atrial fibrillation, unspecified type (CMS/HCC) I48.91 427.31   3. Elevated troponin R74.8 790.6   4. Hypervolemia, unspecified hypervolemia type E87.70 276.69   5. Impaired functional mobility, balance, gait, and endurance Z74.09 V49.89   6. Paroxysmal atrial fibrillation with rapid ventricular response (CMS/HCC) I48.0 427.31     Patient Active Problem List   Diagnosis   • Paroxysmal atrial fibrillation with rapid ventricular response (CMS/HCC)   • Anemia   • Pleural effusion, bilateral   • Elevated troponin   • Diffuse large B cell lymphoma (CMS/HCC)   • Lower extremity edema   • Acute pulmonary edema (CMS/HCC)   • Acute systolic (congestive) heart failure (CMS/HCC)       Physical Therapy Education     Title: PT OT SLP Therapies (Resolved)     Topic: Physical Therapy (Resolved)     Point: Mobility training (Resolved)     Learning Progress Summary           Patient Acceptance, E,TB,CORONA, JEYSON,HOWARD,NR by DANIEL at 2019 12:30 PM    Comment:  Education re: purpose of PT and importance of activity; education re: balance deficits and increase fall risk as well as safety/falls prevention w/ emphasis on use of rwx to improve stability   Significant Other Acceptance, E,TB,CORONA, JEYSON,HOWARD,NR by DANIEL at 2019 12:30 PM    Comment:  Education re: purpose of PT and importance of activity; education re: balance deficits and increase fall risk as well as safety/falls prevention w/ emphasis on use of rwx to improve stability                   Point: Precautions (Resolved)     Learning Progress Summary            Patient Acceptance, E,TB,D, JEYSON,HOWARD,NR by DANIEL at 5/16/2019 12:30 PM    Comment:  Education re: purpose of PT and importance of activity; education re: balance deficits and increase fall risk as well as safety/falls prevention w/ emphasis on use of rwx to improve stability   Significant Other Acceptance, E,TB,CORONA, JEYSON,HOWARD,NR by DANIEL at 5/16/2019 12:30 PM    Comment:  Education re: purpose of PT and importance of activity; education re: balance deficits and increase fall risk as well as safety/falls prevention w/ emphasis on use of rwx to improve stability                               User Key     Initials Effective Dates Name Provider Type Discipline     08/02/18 -  Kassandra Marcus, PT Physical Therapist PT              Rehab Goal Summary     Row Name 05/19/19 1400             Transfer Goal 1 (PT)    [place] Level/Cues Needed (Transfer Goal 1, PT)  independent goal: Independent  -SUGAR      Progress/Outcome (Transfer Goal 1, PT)  goal met  -SUGAR         Gait Training Goal 1 (PT)    [place] Level (Gait Training Goal 1, PT)  independent Goal: SBA and demo safety.  -SUGAR      Progress/Outcome (Gait Training Goal 1, PT)  goal met  -SUGAR         Balance Goal 1 (PT)    [place] Level/Cues Needed (Balance Goal 1, PT)  independent goal: SBA.  -SUGAR         Patient Education Goal (PT)    [place]/Cues/Accuracy (Memory Goal 2, PT)  demonstrates adequately goal: Demo safety adeq.  -SUGAR      Progress/Outcome (Patient Education Goal, PT)  goal met  -SUGAR        User Key  (r) = Recorded By, (t) = Taken By, (c) = Cosigned By    Initials Name Provider Type Discipline    SUGAR Natalia Longoria, PTA Physical Therapy Assistant PT        Therapy Treatment  Rehabilitation Treatment Summary     Row Name 05/19/19 1452             Treatment Time/Intention    Discipline  physical therapy assistant  -SUGAR      Comment   Pt. has been witnessed walking in halls several times yesterday and today. Used walker..giving self therapy with sideways and  backwards walking. hip abd, turning around. Witnessed doing very well. Plan for D/C tomorrow.  -JP2      Recorded by [SUGAR] Natalia Longoria, PTA 05/19/19 1453  [JP2] Natalia Longoria, PTA 05/19/19 1458        User Key  (r) = Recorded By, (t) = Taken By, (c) = Cosigned By    Initials Name Effective Dates Discipline    SUGAR Natalia Longoria, PTA 08/02/16 -  PT             PT Recommendation and Plan  Anticipated Discharge Disposition (PT): home  Outcome Summary/Treatment Plan (PT)  Anticipated Discharge Disposition (PT): home         Time Calculation:         PT G-Codes  Outcome Measure Options: AM-PAC 6 Clicks Basic Mobility (PT)  AM-PAC 6 Clicks Score: 20    PT Discharge Summary  Anticipated Discharge Disposition (PT): home  Reason for Discharge: All goals achieved  Outcomes Achieved: Able to achieve all goals within established timeline  Discharge Destination: Home    Natalia Longoria, PTA  5/19/2019

## 2019-05-20 ENCOUNTER — NURSE TRIAGE (OUTPATIENT)
Dept: CALL CENTER | Facility: HOSPITAL | Age: 79
End: 2019-05-20

## 2019-05-20 VITALS
HEART RATE: 87 BPM | HEIGHT: 74 IN | RESPIRATION RATE: 18 BRPM | BODY MASS INDEX: 19.27 KG/M2 | DIASTOLIC BLOOD PRESSURE: 54 MMHG | OXYGEN SATURATION: 100 % | SYSTOLIC BLOOD PRESSURE: 95 MMHG | WEIGHT: 150.19 LBS | TEMPERATURE: 98.9 F

## 2019-05-20 LAB
ANION GAP SERPL CALCULATED.3IONS-SCNC: 10 MMOL/L (ref 4–13)
ANISOCYTOSIS BLD QL: ABNORMAL
BUN BLD-MCNC: 30 MG/DL (ref 5–21)
BUN/CREAT SERPL: 39.5 (ref 7–25)
CALCIUM SPEC-SCNC: 9.2 MG/DL (ref 8.4–10.4)
CHLORIDE SERPL-SCNC: 95 MMOL/L (ref 98–110)
CO2 SERPL-SCNC: 26 MMOL/L (ref 24–31)
CREAT BLD-MCNC: 0.76 MG/DL (ref 0.5–1.4)
DEPRECATED RDW RBC AUTO: 63 FL (ref 40–54)
EOSINOPHIL # BLD MANUAL: 0.12 10*3/MM3 (ref 0–0.7)
EOSINOPHIL NFR BLD MANUAL: 2.1 % (ref 0–4)
ERYTHROCYTE [DISTWIDTH] IN BLOOD BY AUTOMATED COUNT: 17.9 % (ref 12–15)
GFR SERPL CREATININE-BSD FRML MDRD: 99 ML/MIN/1.73
GIANT PLATELETS: ABNORMAL
GLUCOSE BLD-MCNC: 92 MG/DL (ref 70–100)
HCT VFR BLD AUTO: 33.7 % (ref 40–52)
HGB BLD-MCNC: 11.2 G/DL (ref 14–18)
LYMPHOCYTES # BLD MANUAL: 0.65 10*3/MM3 (ref 0.72–4.86)
LYMPHOCYTES NFR BLD MANUAL: 11.6 % (ref 15–45)
LYMPHOCYTES NFR BLD MANUAL: 9.5 % (ref 4–12)
MACROCYTES BLD QL SMEAR: ABNORMAL
MCH RBC QN AUTO: 32.2 PG (ref 28–32)
MCHC RBC AUTO-ENTMCNC: 33.2 G/DL (ref 33–36)
MCV RBC AUTO: 96.8 FL (ref 82–95)
MONOCYTES # BLD AUTO: 0.54 10*3/MM3 (ref 0.19–1.3)
NEUTROPHILS # BLD AUTO: 4.21 10*3/MM3 (ref 1.87–8.4)
NEUTROPHILS NFR BLD MANUAL: 74.7 % (ref 39–78)
PLATELET # BLD AUTO: 141 10*3/MM3 (ref 130–400)
PMV BLD AUTO: 12.2 FL (ref 6–12)
POTASSIUM BLD-SCNC: 4.4 MMOL/L (ref 3.5–5.3)
RBC # BLD AUTO: 3.48 10*6/MM3 (ref 4.8–5.9)
SODIUM BLD-SCNC: 131 MMOL/L (ref 135–145)
VARIANT LYMPHS NFR BLD MANUAL: 2.1 % (ref 0–5)
WBC MORPH BLD: NORMAL
WBC NRBC COR # BLD: 5.64 10*3/MM3 (ref 4.8–10.8)

## 2019-05-20 PROCEDURE — 85007 BL SMEAR W/DIFF WBC COUNT: CPT | Performed by: FAMILY MEDICINE

## 2019-05-20 PROCEDURE — 85025 COMPLETE CBC W/AUTO DIFF WBC: CPT | Performed by: FAMILY MEDICINE

## 2019-05-20 PROCEDURE — 99232 SBSQ HOSP IP/OBS MODERATE 35: CPT | Performed by: INTERNAL MEDICINE

## 2019-05-20 PROCEDURE — 80048 BASIC METABOLIC PNL TOTAL CA: CPT | Performed by: FAMILY MEDICINE

## 2019-05-20 RX ORDER — DIGOXIN 125 MCG
125 TABLET ORAL
Qty: 30 TABLET | Refills: 2 | Status: SHIPPED | OUTPATIENT
Start: 2019-05-21 | End: 2019-05-20

## 2019-05-20 RX ORDER — DILTIAZEM HYDROCHLORIDE 360 MG/1
360 CAPSULE, EXTENDED RELEASE ORAL
Qty: 30 CAPSULE | Refills: 2 | Status: SHIPPED | OUTPATIENT
Start: 2019-05-21 | End: 2019-05-20

## 2019-05-20 RX ORDER — DIGOXIN 125 MCG
125 TABLET ORAL
Qty: 30 TABLET | Refills: 2 | Status: SHIPPED | OUTPATIENT
Start: 2019-05-21 | End: 2019-08-08 | Stop reason: SDUPTHER

## 2019-05-20 RX ORDER — DILTIAZEM HYDROCHLORIDE 360 MG/1
360 CAPSULE, EXTENDED RELEASE ORAL
Qty: 30 CAPSULE | Refills: 2 | Status: SHIPPED | OUTPATIENT
Start: 2019-05-21 | End: 2019-06-13 | Stop reason: HOSPADM

## 2019-05-20 RX ADMIN — METOPROLOL TARTRATE 12.5 MG: 25 TABLET, FILM COATED ORAL at 08:59

## 2019-05-20 RX ADMIN — FUROSEMIDE 40 MG: 40 TABLET ORAL at 09:01

## 2019-05-20 RX ADMIN — METFORMIN HYDROCHLORIDE 1000 MG: 500 TABLET ORAL at 08:59

## 2019-05-20 RX ADMIN — DIGOXIN 125 MCG: 125 TABLET ORAL at 11:42

## 2019-05-20 RX ADMIN — POTASSIUM CHLORIDE 20 MEQ: 750 CAPSULE, EXTENDED RELEASE ORAL at 08:59

## 2019-05-20 RX ADMIN — APIXABAN 5 MG: 5 TABLET, FILM COATED ORAL at 08:59

## 2019-05-20 RX ADMIN — PANTOPRAZOLE SODIUM 40 MG: 40 TABLET, DELAYED RELEASE ORAL at 08:59

## 2019-05-20 RX ADMIN — MEGESTROL ACETATE 400 MG: 40 SUSPENSION ORAL at 08:59

## 2019-05-20 RX ADMIN — GABAPENTIN 100 MG: 100 CAPSULE ORAL at 08:59

## 2019-05-20 RX ADMIN — DILTIAZEM HYDROCHLORIDE 360 MG: 180 CAPSULE, COATED, EXTENDED RELEASE ORAL at 08:59

## 2019-05-20 NOTE — DISCHARGE SUMMARY
[facility] Medicine Services  DISCHARGE SUMMARY       Date of Admission: 5/15/2019  Date of Discharge:  5/20/2019  Primary Care Physician: Woody Mathis,     Discharge Diagnoses:  Patient Active Problem List   Diagnosis   • Paroxysmal atrial fibrillation with rapid ventricular response (CMS/HCC)   • Anemia   • Pleural effusion, bilateral   • Elevated troponin   • Diffuse large B cell lymphoma (CMS/HCC)   • Lower extremity edema   • Acute pulmonary edema (CMS/HCC)   • Acute systolic (congestive) heart failure (CMS/HCC)         Presenting Problem/History of Present Illness:  Acute congestive heart failure, unspecified heart failure type (CMS/HCC) [I50.9]  Acute congestive heart failure, unspecified heart failure type (CMS/HCC) [I50.9]     Chief Complaint on Day of Discharge:   No complaints    History of Present Illness on Day of Discharge:   No acute events noted overnight.  Patient is ambulating without shortness of breath.  He has had an additional 1230 cc out since yesterday.  Cardiology has evaluated the patient and agrees that he is appropriate for discharge today.    Hospital Course  Patient is a 79-year-old white male with known past history of B-cell lymphoma, diabetes diet controlled, A. fib with RVR, and hypertension who presents to the ER with progressive shortness of breath and increasing lower extremity edema.  Patient has been on chemotherapy for his lymphoma since about January and received 5 treatments however has not had any chemo in about 4 weeks.  He is followed by Dr. Keila HERRING.  Patient saw Dr. Meyer on March 19 for issues of an irregular heart rate sent from oncology.  At that time he did have some lower extremity swelling and was found to have A. fib on EKG with some continued issues with rapid rate therefore eventually was started on Cardizem.  He thinks he has been on this for about 1 month timeframe and takes 30 mg 4 times a day.  Despite  this he is continued to have rate issues when checked at home.  He can tell sometimes based on palpitations.  He denies having any chest pain.  He states that his lower extremity swelling has progressively gotten much worse.  He was given Lasix by his oncologist however his first dose was yesterday and he obviously has not responded to that at this point.  In the ER the patient has otherwise had stable vital signs.  He has been given Cardizem bolus x2 with continued rapid rate as high as 170s.  He is just been started on a Cardizem drip.  His CT angios shows no evidence of PE however bilateral pleural effusions other congestion noted and cardiomegaly.  He has elevated BNP as well.  Last echocardiogram in January was fairly unremarkable.  He does also have a mildly elevated troponin here as well as an elevated BNP.    Plan:    1. cardizem gtt although may need additional agents as he currently remains tachy after about 10min on gtt. bp may not tolerate higher dose  2. Cardiology consult as sees dr goldstein for this issue  3. IV lasix/weights/i/o  4. 2 d echo, last in jan was ok, had an order to get one on Thursday anyway  5. lovenox dvt proph, did not do full dose as he and dr goldstein had decided to not anticoagulate. May reconsider now that he is finished with chemo  6. Serial troponins, am labs    The patient was anticoagulated with Eliquis.  IV digoxin was added to his regimen.  Beta-blocker was added later as he became compensated from a heart failure standpoint and when his blood pressure was tolerant.  IV Lasix continued for 3 days and was changed to oral Lasix.  Urine output became stagnant and IV Lasix was reinitiated.  Patient's blood pressure was marginal at times though ACE inhibitors were not added.  Beta-blockers were initiated on the fourth hospital day as the patient's blood pressure and heart failure stabilized.  He continued with aggressive diuresis.  He diuresed roughly 1500 cc during his hospital  stay.  The edema of his legs feet and ankles decreased significantly and at the time of discharge she had edema only of his ankles.  Patient is ambulating without shortness of breath.  He has had an additional 1230 cc out since yesterday.  Cardiology has evaluated the patient and agrees that he is appropriate for discharge today.        Consults:   Cardiology:  ASSESSMENT/PLAN:     1.  Acute diastolic heart failure/volume overload, driven by problem #2  2.  Persistent atrial fibrillation, rapid ventricular response  3.  Non-Hodgkin's lymphoma, status post recent completion of chemotherapy  4.  Small pericardial effusion  5.  Anemia  6.  Leukopenia  7.  Thrombocytopenia  8.  Hyponatremia  9.  Elevated troponin: Not consistent with acute myocardial necrosis, likely secondary to problem #1     -The patient is here with volume overload, likely multifactorial, however certainly not helped with rapid ventricular response.  In any event, agree with increasing Lasix to 40 mg IV twice daily, monitor daily renal function panels, ins and outs strictly, daily weights, follow low-sodium diet  -Continue efforts at rate control with Cardizem.  The patient is asymptomatic, however would benefit from further heart rate control.  Continue digoxin loading.  -Agree with initiation of therapeutic anticoagulation as this patient may benefit from cardioversion in the future.  -Certainly, if heart rate does not become adequately controlled with rate control medications and diuretics, we may consider cardioversion during this admission, however there seems to be no urgent need for this at this time.  -We will finalize the results of the patient's echocardiogram.   -Thank you for this consultation.  We will continue to follow closely.    Pertinent Test Results:   Lab Results (last 7 days)     Procedure Component Value Units Date/Time    CBC & Differential [[id number]] Collected:  05/20/19 0323    Specimen:  Blood Updated:  05/20/19 0132     Narrative:       The following orders were created for panel order CBC & Differential.  Procedure                               Abnormality         Status                     ---------                               -----------         ------                     CBC Auto Differential[[id number]]        Abnormal            Final result                 Please view results for these tests on the individual orders.    CBC Auto Differential [[id number]]  (Abnormal) Collected:  05/20/19 0323    Specimen:  Blood Updated:  05/20/19 0503     WBC 5.64 10*3/mm3      RBC 3.48 10*6/mm3      Hemoglobin 11.2 g/dL      Hematocrit 33.7 %      MCV 96.8 fL      MCH 32.2 pg      MCHC 33.2 g/dL      RDW 17.9 %      RDW-SD 63.0 fl      MPV 12.2 fL      Platelets 141 10*3/mm3     Narrative:       The previously reported component NRBC is no longer being reported.    Manual Differential [[id number]]  (Abnormal) Collected:  05/20/19 0323    Specimen:  Blood Updated:  05/20/19 0503     Neutrophil % 74.7 %      Lymphocyte % 11.6 %      Monocyte % 9.5 %      Eosinophil % 2.1 %      Atypical Lymphocyte % 2.1 %      Neutrophils Absolute 4.21 10*3/mm3      Lymphocytes Absolute 0.65 10*3/mm3      Monocytes Absolute 0.54 10*3/mm3      Eosinophils Absolute 0.12 10*3/mm3      Anisocytosis Slight/1+     Macrocytes Slight/1+     WBC Morphology Normal     Giant Platelets Slight/1+    Basic Metabolic Panel [[id number]]  (Abnormal) Collected:  05/20/19 0323    Specimen:  Blood Updated:  05/20/19 0436     Glucose 92 mg/dL      BUN 30 mg/dL      Creatinine 0.76 mg/dL      Sodium 131 mmol/L      Potassium 4.4 mmol/L      Chloride 95 mmol/L      CO2 26.0 mmol/L      Calcium 9.2 mg/dL      eGFR Non African Amer 99 mL/min/1.73      BUN/Creatinine Ratio 39.5     Anion Gap 10.0 mmol/L     Narrative:       GFR Normal >60  Chronic Kidney Disease <60  Kidney Failure <15    Manual Differential [[id number]]  (Abnormal) Collected:  05/19/19 0303    Specimen:  Blood Updated:   05/19/19 0523     Neutrophil % 71.0 %      Lymphocyte % 15.0 %      Monocyte % 13.0 %      Atypical Lymphocyte % 1.0 %      Neutrophils Absolute 3.27 10*3/mm3      Lymphocytes Absolute 0.69 10*3/mm3      Monocytes Absolute 0.60 10*3/mm3      Anisocytosis Slight/1+     WBC Morphology Normal     Platelet Estimate Adequate    CBC & Differential [[id number]] Collected:  05/19/19 0303    Specimen:  Blood Updated:  05/19/19 0523    Narrative:       The following orders were created for panel order CBC & Differential.  Procedure                               Abnormality         Status                     ---------                               -----------         ------                     CBC Auto Differential[[id number]]        Abnormal            Final result                 Please view results for these tests on the individual orders.    CBC Auto Differential [[id number]]  (Abnormal) Collected:  05/19/19 0303    Specimen:  Blood Updated:  05/19/19 0523     WBC 4.60 10*3/mm3      RBC 3.35 10*6/mm3      Hemoglobin 10.9 g/dL      Hematocrit 32.1 %      MCV 95.8 fL      MCH 32.5 pg      MCHC 34.0 g/dL      RDW 18.2 %      RDW-SD 62.7 fl      MPV 12.1 fL      Platelets 138 10*3/mm3     Narrative:       The previously reported component NRBC is no longer being reported.    Basic Metabolic Panel [[id number]]  (Abnormal) Collected:  05/19/19 0303    Specimen:  Blood Updated:  05/19/19 0355     Glucose 97 mg/dL      BUN 29 mg/dL      Creatinine 0.81 mg/dL      Sodium 131 mmol/L      Potassium 4.1 mmol/L      Chloride 96 mmol/L      CO2 27.0 mmol/L      Calcium 9.0 mg/dL      eGFR Non African Amer 92 mL/min/1.73      BUN/Creatinine Ratio 35.8     Anion Gap 8.0 mmol/L     Narrative:       GFR Normal >60  Chronic Kidney Disease <60  Kidney Failure <15    CBC & Differential [[id number]] Collected:  05/18/19 0358    Specimen:  Blood Updated:  05/18/19 0443    Narrative:       The following orders were created for panel order CBC &  Differential.  Procedure                               Abnormality         Status                     ---------                               -----------         ------                     CBC Auto Differential[[id number]]        Abnormal            Final result                 Please view results for these tests on the individual orders.    CBC Auto Differential [[id number]]  (Abnormal) Collected:  05/18/19 0358    Specimen:  Blood Updated:  05/18/19 0443     WBC 4.18 10*3/mm3      RBC 3.22 10*6/mm3      Hemoglobin 10.6 g/dL      Hematocrit 31.7 %      MCV 98.4 fL      MCH 32.9 pg      MCHC 33.4 g/dL      RDW 18.6 %      RDW-SD 65.6 fl      MPV 11.7 fL      Platelets 124 10*3/mm3      Neutrophil % 52.1 %      Lymphocyte % 20.1 %      Monocyte % 24.4 %      Eosinophil % 1.9 %      Basophil % 0.5 %      Immature Grans % 1.0 %      Neutrophils, Absolute 2.18 10*3/mm3      Lymphocytes, Absolute 0.84 10*3/mm3      Monocytes, Absolute 1.02 10*3/mm3      Eosinophils, Absolute 0.08 10*3/mm3      Basophils, Absolute 0.02 10*3/mm3      Immature Grans, Absolute 0.04 10*3/mm3      nRBC 0.0 /100 WBC     Basic Metabolic Panel [[id number]]  (Abnormal) Collected:  05/18/19 0358    Specimen:  Blood Updated:  05/18/19 0436     Glucose 108 mg/dL      BUN 26 mg/dL      Creatinine 0.80 mg/dL      Sodium 132 mmol/L      Potassium 4.3 mmol/L      Chloride 98 mmol/L      CO2 25.0 mmol/L      Calcium 9.2 mg/dL      eGFR Non African Amer 93 mL/min/1.73      BUN/Creatinine Ratio 32.5     Anion Gap 9.0 mmol/L     Narrative:       GFR Normal >60  Chronic Kidney Disease <60  Kidney Failure <15    CBC & Differential [[id number]] Collected:  05/17/19 0459    Specimen:  Blood Updated:  05/17/19 0547    Narrative:       The following orders were created for panel order CBC & Differential.  Procedure                               Abnormality         Status                     ---------                               -----------         ------                      CBC Auto Differential[[id number]]        Abnormal            Final result                 Please view results for these tests on the individual orders.    CBC Auto Differential [[id number]]  (Abnormal) Collected:  05/17/19 0459    Specimen:  Blood Updated:  05/17/19 0547     WBC 3.75 10*3/mm3      RBC 3.26 10*6/mm3      Hemoglobin 10.8 g/dL      Hematocrit 32.4 %      MCV 99.4 fL      MCH 33.1 pg      MCHC 33.3 g/dL      RDW 19.0 %      RDW-SD 67.8 fl      MPV 11.3 fL      Platelets 120 10*3/mm3      Neutrophil % 54.2 %      Lymphocyte % 19.7 %      Monocyte % 23.5 %      Eosinophil % 1.6 %      Basophil % 0.5 %      Neutrophils, Absolute 2.03 10*3/mm3      Lymphocytes, Absolute 0.74 10*3/mm3      Monocytes, Absolute 0.88 10*3/mm3      Eosinophils, Absolute 0.06 10*3/mm3      Basophils, Absolute 0.02 10*3/mm3     Basic Metabolic Panel [[id number]]  (Abnormal) Collected:  05/17/19 0459    Specimen:  Blood Updated:  05/17/19 0525     Glucose 112 mg/dL      BUN 18 mg/dL      Creatinine 0.68 mg/dL      Sodium 136 mmol/L      Potassium 4.4 mmol/L      Chloride 99 mmol/L      CO2 28.0 mmol/L      Calcium 9.6 mg/dL      eGFR Non African Amer 112 mL/min/1.73      BUN/Creatinine Ratio 26.5     Anion Gap 9.0 mmol/L     Narrative:       GFR Normal >60  Chronic Kidney Disease <60  Kidney Failure <15    Hemoglobin A1c [[id number]] Collected:  05/16/19 0510    Specimen:  Blood Updated:  05/16/19 0741     Hemoglobin A1C 5.8 %     Narrative:       Less than 6.0           Non-Diabetic Range  6.0-7.0                 ADA Therapeutic Target  Greater than 7.0        Action Suggested    CBC & Differential [[id number]] Collected:  05/16/19 0510    Specimen:  Blood Updated:  05/16/19 0603    Narrative:       The following orders were created for panel order CBC & Differential.  Procedure                               Abnormality         Status                     ---------                               -----------         ------                      CBC Auto Differential[[id number]]        Abnormal            Final result                 Please view results for these tests on the individual orders.    CBC Auto Differential [[id number]]  (Abnormal) Collected:  05/16/19 0510    Specimen:  Blood Updated:  05/16/19 0603     WBC 2.84 10*3/mm3      RBC 3.02 10*6/mm3      Hemoglobin 10.0 g/dL      Hematocrit 29.5 %      MCV 97.7 fL      MCH 33.1 pg      MCHC 33.9 g/dL      RDW 18.8 %      RDW-SD 66.2 fl      MPV 11.7 fL      Platelets 92 10*3/mm3     Manual Differential [[id number]]  (Abnormal) Collected:  05/16/19 0510    Specimen:  Blood Updated:  05/16/19 0603     Neutrophil % 70.8 %      Lymphocyte % 11.5 %      Monocyte % 10.4 %      Eosinophil % 2.1 %      Basophil % 2.1 %      Atypical Lymphocyte % 3.1 %      Neutrophils Absolute 2.01 10*3/mm3      Lymphocytes Absolute 0.33 10*3/mm3      Monocytes Absolute 0.30 10*3/mm3      Eosinophils Absolute 0.06 10*3/mm3      Basophils Absolute 0.06 10*3/mm3      nRBC 5.2 /100 WBC      Anisocytosis Slight/1+     Macrocytes Slight/1+     Poikilocytes Slight/1+     WBC Morphology Normal     Platelet Estimate Decreased     Giant Platelets Slight/1+    Lipid Panel [[id number]]  (Abnormal) Collected:  05/16/19 0510    Specimen:  Blood Updated:  05/16/19 0602     Total Cholesterol 123 mg/dL      Triglycerides 104 mg/dL      HDL Cholesterol 33 mg/dL      LDL Cholesterol  81 mg/dL      LDL/HDL Ratio 2.10    Basic Metabolic Panel [[id number]]  (Abnormal) Collected:  05/16/19 0510    Specimen:  Blood Updated:  05/16/19 0553     Glucose 105 mg/dL      BUN 20 mg/dL      Creatinine 0.74 mg/dL      Sodium 136 mmol/L      Potassium 4.0 mmol/L      Chloride 100 mmol/L      CO2 29.0 mmol/L      Calcium 9.4 mg/dL      eGFR Non African Amer 102 mL/min/1.73      BUN/Creatinine Ratio 27.0     Anion Gap 7.0 mmol/L     Narrative:       GFR Normal >60  Chronic Kidney Disease <60  Kidney Failure <15    TSH [[id number]]  (Normal) Collected:   05/15/19 1024    Specimen:  Blood Updated:  05/15/19 1150     TSH 1.650 mIU/mL     Troponin [[id number]]  (Abnormal) Collected:  05/15/19 0420    Specimen:  Blood Updated:  05/15/19 0451     Troponin I 0.237 ng/mL     Urinalysis With Culture If Indicated - Urine, Clean Catch [[id number]]  (Normal) Collected:  05/15/19 0313    Specimen:  Urine, Clean Catch Updated:  05/15/19 0349     Color, UA Yellow     Appearance, UA Clear     pH, UA 6.5     Specific Gravity, UA 1.017     Glucose, UA Negative     Ketones, UA Negative     Bilirubin, UA Negative     Blood, UA Negative     Protein, UA Negative     Leuk Esterase, UA Negative     Nitrite, UA Negative     Urobilinogen, UA 1.0 E.U./dL    Narrative:       Urine microscopic not indicated.    CBC & Differential [[id number]] Collected:  05/15/19 0053    Specimen:  Blood Updated:  05/15/19 0156    Narrative:       The following orders were created for panel order CBC & Differential.  Procedure                               Abnormality         Status                     ---------                               -----------         ------                     CBC Auto Differential[[id number]]        Abnormal            Final result                 Please view results for these tests on the individual orders.    CBC Auto Differential [[id number]]  (Abnormal) Collected:  05/15/19 0053    Specimen:  Blood Updated:  05/15/19 0156     WBC 4.36 10*3/mm3      RBC 3.08 10*6/mm3      Hemoglobin 10.3 g/dL      Hematocrit 30.1 %      MCV 97.7 fL      MCH 33.4 pg      MCHC 34.2 g/dL      RDW 19.2 %      RDW-SD 66.0 fl      MPV 12.8 fL      Platelets 115 10*3/mm3      Neutrophil % 57.8 %      Lymphocyte % 15.6 %      Monocyte % 24.3 %      Eosinophil % 0.7 %      Basophil % 0.9 %      Neutrophils, Absolute 2.52 10*3/mm3      Lymphocytes, Absolute 0.68 10*3/mm3      Monocytes, Absolute 1.06 10*3/mm3      Eosinophils, Absolute 0.03 10*3/mm3      Basophils, Absolute 0.04 10*3/mm3     BNP [[id number]]   (Abnormal) Collected:  05/15/19 0053    Specimen:  Blood Updated:  05/15/19 0131     proBNP 9,170.0 pg/mL     Troponin [[id number]]  (Abnormal) Collected:  05/15/19 0053    Specimen:  Blood Updated:  05/15/19 0131     Troponin I 0.252 ng/mL     Comprehensive Metabolic Panel [[id number]]  (Abnormal) Collected:  05/15/19 0053    Specimen:  Blood Updated:  05/15/19 0123     Glucose 98 mg/dL      BUN 27 mg/dL      Creatinine 0.76 mg/dL      Sodium 132 mmol/L      Potassium 4.9 mmol/L      Chloride 98 mmol/L      CO2 22.0 mmol/L      Calcium 9.3 mg/dL      Total Protein 5.7 g/dL      Albumin 3.60 g/dL      ALT (SGPT) 33 U/L      AST (SGOT) 43 U/L      Alkaline Phosphatase 68 U/L      Total Bilirubin 0.5 mg/dL      eGFR Non African Amer 99 mL/min/1.73      Globulin 2.1 gm/dL      A/G Ratio 1.7 g/dL      BUN/Creatinine Ratio 35.5     Anion Gap 12.0 mmol/L     Narrative:       GFR Normal >60  Chronic Kidney Disease <60  Kidney Failure <15    Protime-INR [[id number]]  (Normal) Collected:  05/15/19 0053    Specimen:  Blood Updated:  05/15/19 0123     Protime 14.4 Seconds      INR 1.08    aPTT [[id number]]  (Normal) Collected:  05/15/19 0053    Specimen:  Blood Updated:  05/15/19 0123     PTT 28.7 seconds         Imaging Results (last 7 days)     Procedure Component Value Units Date/Time    CT Angiogram Chest With Contrast [[id number]] Collected:  05/15/19 0710     Updated:  05/15/19 0722    Narrative:       History:  79-year-old with shortness of breath and atrial fibrillation.     Reference:  CT chest 03/11/2019.     Technique:  Following the administration of intravenous contrast, helical  acquisition was obtained from the thoracic inlet through the diaphragm  during the arterial phase. Multiplanar reconstructed images including 3D  MIP were obtained.     For this CT exam, one or more of the following dose reduction techniques  was employed:  -automated exposure control  -mA and/or kVp adjustment for patient size  -iterative  reconstruction      mGy-cm     Findings:     Vascular findings:  No pulmonary embolus. No thoracic aortic dissection. There is mild  calcified plaque throughout the thoracic aorta. The heart is enlarged.  Extensive three-vessel coronary artery atherosclerotic calcifications.  There is a small pericardial effusion.      Nonvascular findings:  2.2 x 1.7 cm lymph node in the aorticopulmonary window has decreased in  size from December 2018 when measured 3.5 x 2.6 cm. There are mildly  enlarged right hilar lymph node measuring 2.8 x 1.2 cm. Left subclavian  Port-A-Cath, tip in SVC.     Moderate right pleural effusion. Small left pleural effusion. Partial  process of atelectasis at the lower lobe bases. There is peribronchial  thickening. No residual pulmonary mass or nodule. No endobronchial mass.     Imaging to the upper abdomen shows mesenteric edema. Slight gallbladder  wall thickening likely related to vascular congestion. No bone  metastatic disease. Degenerative changes of the spine.          Impression:          1. Moderate right and small left pleural effusions. Adjacent atelectasis  at the lower lobe bases.  2. Pathologic left aorticopulmonary window lymph node has decreased in  size from December 2018 but stable from March. No residual left upper  lobe mass.  3. New mildly enlarged right hilar lymph node, cannot differentiate  reactive node from new site of disease.  4. Small pericardial effusion.     A preliminary report was provided by [facility].     This report was finalized on 05/15/2019 07:19 by Dr Denis Montes, .    XR Chest 1 View [[id number]] Collected:  05/15/19 0708     Updated:  05/15/19 0713    Narrative:       History:  79-year-old with shortness of breath.     Reference:  CT chest 03/11/2019.     Findings:  Frontal chest radiograph performed.     Normal heart size. Suspect atelectasis in the retrocardiac region. Small  pleural effusions. Left subclavian Port-A-Cath, tip SVC.  "Atherosclerotic  thoracic aorta. Degenerative hypertrophic changes of the thoracic spine.          Impression:       Small pleural effusions and probable retrocardiac atelectasis.  This report was finalized on 05/15/2019 07:10 by Dr Denis Montes, .          Condition on Discharge:    Stable    Physical Exam on Discharge:  BP 95/54 (BP Location: Right arm, Patient Position: Sitting)   Pulse 87   Temp 98.9 °F (37.2 °C) (Oral)   Resp 18   Ht 188 cm (74.02\")   Wt 68.1 kg (150 lb 3 oz)   SpO2 100%   BMI 19.27 kg/m²   Physical Exam  Constitutional: He is oriented to person, place, and time. He appears well-developed and well-nourished. He is cooperative.   HENT:   Head: Normocephalic and atraumatic.   Nose: Nose normal.   Mouth/Throat: Oropharynx is clear and moist.   Eyes: Conjunctivae are normal. No scleral icterus.   Neck: Normal range of motion. Neck supple. No JVD present.   Cardiovascular: Normal rate, regular rhythm, normal heart sounds and intact distal pulses.   No murmur heard.  Pulmonary/Chest: Effort normal and breath sounds normal. He has no wheezes.  No rales present.  Abdominal: Soft. Bowel sounds are normal. He exhibits no mass. There is no tenderness.   Musculoskeletal: Normal range of motion.  Trace edema BLE of ankles.  Neurological: He is alert and oriented to person, place, and time. Coordination normal.   Skin: Skin is warm and dry. No rash noted.   Psychiatric: He has a normal mood and affect.      Discharge Disposition:  Home or Self Care    Discharge Medications:     Discharge Medications      New Medications      Instructions Start Date   apixaban 5 MG tablet tablet  Commonly known as:  ELIQUIS   5 mg, Oral, Every 12 Hours Scheduled      digoxin 125 MCG tablet  Commonly known as:  LANOXIN   125 mcg, Oral, Daily Digoxin   Start Date:  5/21/2019     diltiaZEM  MG 24 hr capsule  Commonly known as:  CARDIZEM CD   360 mg, Oral, Every 24 Hours Scheduled   Start Date:  5/21/2019     metoprolol " tartrate 25 MG tablet  Commonly known as:  LOPRESSOR   12.5 mg, Oral, Every 12 Hours Scheduled         Continue These Medications      Instructions Start Date   CLARITIN 10 MG tablet  Generic drug:  loratadine   10 mg, Oral, Daily      finasteride 5 MG tablet  Commonly known as:  PROSCAR   5 mg, Oral, Daily      furosemide 20 MG tablet  Commonly known as:  LASIX   20 mg, Oral, Daily      HYDROcodone-acetaminophen 7.5-325 MG per tablet  Commonly known as:  NORCO   1 tablet, Oral, Every 8 Hours PRN      Lidocaine-Prilocaine (Bulk) 2.5-2.5 % cream   1 application, Topical, As Needed      megestrol 40 MG/ML suspension  Commonly known as:  MEGACE   400 mg, Oral, Daily      melatonin 5 MG tablet tablet   5 mg, Oral, Nightly      metFORMIN 500 MG tablet  Commonly known as:  GLUCOPHAGE   1,000 mg, Oral, 2 Times Daily With Meals      NEURONTIN 100 MG capsule  Generic drug:  gabapentin   100 mg, Oral, 3 Times Daily      pantoprazole 40 MG EC tablet  Commonly known as:  PROTONIX   40 mg, Oral, 2 Times Daily      polyethylene glycol packet  Commonly known as:  MIRALAX   17 g, Oral, Daily PRN      potassium chloride 20 MEQ CR tablet  Commonly known as:  K-DUR,KLOR-CON   20 mEq, Oral, Daily      vitamin b complex capsule capsule   1 capsule, Oral, Daily         Stop These Medications    diltiaZEM 30 MG tablet  Commonly known as:  CARDIZEM            Discharge Diet:   Diet Instructions     Diet: Regular; Thin      Discharge Diet:  Regular    Fluid Consistency:  Thin        Heart healthy diet                    Discharge Care Plan / Instructions:   Discharge home    Activity at Discharge:   Activity Instructions     Activity as Tolerated            Follow-up Appointments:  Follow-up with primary care physician next week for recheck BMP  Follow-up with Dr. Meyer in 2 weeks at [facility]       Stephane Ruvalcaba DO  05/20/19  12:37 PM    Time: Discharge Over 30 min    Please note that portions of this note may have been  completed with a voice recognition program. Efforts were made to edit the dictations, but occasionally words are mistranscribed.

## 2019-05-20 NOTE — PLAN OF CARE
Problem: Patient Care Overview  Goal: Plan of Care Review  Outcome: Ongoing (interventions implemented as appropriate)   05/20/19 0505   Coping/Psychosocial   Plan of Care Reviewed With patient   OTHER   Outcome Summary VSS, no c/o pain, edema present but improving, no c/o SOA, afib  up to 133 f-pvc, coup, 15 beats of v-tach.   Plan of Care Review   Progress improving       Problem: Fall Risk (Adult)  Goal: Identify Related Risk Factors and Signs and Symptoms  Outcome: Ongoing (interventions implemented as appropriate)    Goal: Absence of Fall  Outcome: Ongoing (interventions implemented as appropriate)      Problem: Cardiac Output Decreased (Adult)  Goal: Effective Tissue Perfusion  Outcome: Ongoing (interventions implemented as appropriate)

## 2019-05-20 NOTE — TELEPHONE ENCOUNTER
"Notified Dr. Meyer of BP 88/49, HR 55, patient dizzy and SOA. Wife is very anxious on the call. He ordered to hold metoprolol 12.5 mg tonight and tomorrow if SBP less than 100 then hold it tomorrow morning along with cardizem  mg that he takes once daily. Attempted to give instructions to wife but she was too upset and nervous. She asked me to call her daughter who is a pharmacist, Blossom Alvarez at [phone]. I contacted the daughter and she repeated instruction back to me.     Reason for Disposition  • [1] Systolic BP < 90 AND [2] dizzy, lightheaded, or weak    Additional Information  • Negative: Started suddenly after an allergic medicine, an allergic food, or bee sting  • Negative: Shock suspected (e.g., cold/pale/clammy skin, too weak to stand, low BP, rapid pulse)  • Negative: Difficult to awaken or acting confused (e.g., disoriented, slurred speech)  • Negative: Fainted    Answer Assessment - Initial Assessment Questions  1. BLOOD PRESSURE: \"What is the blood pressure?\" \"Did you take at least two measurements 5 minutes apart?\"      88/49  2. ONSET: \"When did you take your blood pressure?\"     5pm  3. HOW: \"How did you obtain the blood pressure?\" (e.g., visiting nurse, automatic home BP monitor)      Home cuff  4. HISTORY: \"Do you have a history of low blood pressure?\" \"What is your blood pressure normally?\"      Yes  5. MEDICATIONS: \"Are you taking any medications for blood pressure?\" If yes: \"Have they been changed recently?\"      Metoprolol, diltiazem CD, furosemide  6. PULSE RATE: \"Do you know what your pulse rate is?\"       55  7. OTHER SYMPTOMS: \"Have you been sick recently?\" \"Have you had a recent injury?\"      CHF, afib, recent hospitalization  8. PREGNANCY: \"Is there any chance you are pregnant?\" \"When was your last menstrual period?\"      NA    Protocols used: LOW BLOOD PRESSURE-ADULT-AH      "

## 2019-05-20 NOTE — PROGRESS NOTES
"  Chief Complaint   Patient presents with   • Shortness of Breath     S: There were no acute events overnight.  No palpitations.  Ambulatory without difficulty.  Edema improving - now only at ankles.     Medications: Reviewed    Review of Systems: All pertinent negative and positives as noted above.  Otherwise, all systems reviewed and found to be negative.    Telemetry: atrial fibrillation with HR 's, currently 70-90's range    O:  /65 (BP Location: Right arm, Patient Position: Lying)   Pulse 85   Temp 98.9 °F (37.2 °C) (Oral)   Resp 18   Ht 188 cm (74.02\")   Wt 68.1 kg (150 lb 3 oz)   SpO2 97%   BMI 19.27 kg/m²   Temp:  [97.7 °F (36.5 °C)-99.1 °F (37.3 °C)] 98.9 °F (37.2 °C)  Heart Rate:  [67-92] 85  Resp:  [18] 18  BP: ()/(51-65) 144/65     Intake/Output Summary (Last 24 hours) at 5/20/2019 1019  Last data filed at 5/20/2019 0732  Gross per 24 hour   Intake 720 ml   Output 1950 ml   Net -1230 ml     Gen: NAD, chronically ill appearing  CV: Irreg, irreg  Pulm: CTAB  Gi: s, nt, +bs  Ext: ankle edema only at this time    Diagnostic Data:    Lab Results   Component Value Date    GLUCOSE 92 05/20/2019    CALCIUM 9.2 05/20/2019     (L) 05/20/2019    K 4.4 05/20/2019    CO2 26.0 05/20/2019    CL 95 (L) 05/20/2019    BUN 30 (H) 05/20/2019    CREATININE 0.76 05/20/2019    EGFRIFNONA 99 05/20/2019    BCR 39.5 (H) 05/20/2019    ANIONGAP 10.0 05/20/2019     ASSESSMENT/PLAN:    1.  Acute systolic and diastolic heart failure/volume overload, driven by problem #2  2.  Persistent atrial fibrillation, rapid ventricular response  3.  Non-Hodgkin's lymphoma, status post recent completion of chemotherapy  4.  Small pericardial effusion  5.  Anemia  6.  Leukopenia  7.  Thrombocytopenia  8.  Hyponatremia  9.  Elevated troponin (POA): Not consistent with acute myocardial necrosis, likely secondary to problem #1     -Converted to oral diuretics - continue current dose.  Would recommend BMP later this week " at PCP office to monitor Cr, K levels.  -Continue Cardizem at present dose - HR reasonably well controlled. Continue Eliquis.  Will plan for outpatient cardioversion in approximately 3 weeks (patient has been on on oral anticoagulation since 5/15, so will plan cardioversion 4 weeks from time of initiation).  -Patient tolerating low dose lopressor at this time, but will not tolerate ACEI, given relatively low BP readings (with exception of most recent reading).  - Likely to be discharged today.  Recommend follow-up with our [facility] clinic in 2 weeks to re-evaluate.  At that appointment, will set up cardioversion.

## 2019-05-21 ENCOUNTER — READMISSION MANAGEMENT (OUTPATIENT)
Dept: CALL CENTER | Facility: HOSPITAL | Age: 79
End: 2019-05-21

## 2019-05-21 NOTE — OUTREACH NOTE
Prep Survey      Responses   Facility patient discharged from?  [place]   Is patient eligible?  Yes   Discharge diagnosis  CHF,  AFIB/RVR,  PE   Does the patient have one of the following disease processes/diagnoses(primary or secondary)?  CHF   Does the patient have Home health ordered?  No   Is there a DME ordered?  No   Comments regarding appointments  see AVS   Medication alerts for this patient  eliquis, digoxin, cardizemk lopressor   Prep survey completed?  Yes          Yaneth Key RN

## 2019-05-22 ENCOUNTER — READMISSION MANAGEMENT (OUTPATIENT)
Dept: CALL CENTER | Facility: HOSPITAL | Age: 79
End: 2019-05-22

## 2019-05-22 ENCOUNTER — NURSE TRIAGE (OUTPATIENT)
Dept: CALL CENTER | Facility: HOSPITAL | Age: 79
End: 2019-05-22

## 2019-05-22 NOTE — TELEPHONE ENCOUNTER
"Caller states I'm needing the order for a BNP that Dr. Meyer wanted sent to my local MD here. Caller given Dr. Meyer's office number and he is aware office can assist with that.     Reason for Disposition  • [1] Caller requesting NON-URGENT health information AND [2] PCP's office is the best resource    Additional Information  • Negative: [1] Caller is not with the adult (patient) AND [2] reporting urgent symptoms  • Negative: Lab result questions  • Negative: Medication questions  • Negative: Caller cannot be reached by phone  • Negative: Caller has already spoken to PCP or another triager  • Negative: RN needs further essential information from caller in order to complete triage  • Negative: Requesting regular office appointment  • Negative: Health Information question, no triage required and triager able to answer question    Answer Assessment - Initial Assessment Questions  1. REASON FOR CALL or QUESTION: \"What is your reason for calling today?\" or \"How can I best help you?\" or \"What question do you have that I can help answer?\"      I need that order faxed    Protocols used: INFORMATION ONLY CALL-ADULT-      "

## 2019-05-22 NOTE — OUTREACH NOTE
CHF Week 1 Survey      Responses   Facility patient discharged from?  [place]   Does the patient have one of the following disease processes/diagnoses(primary or secondary)?  CHF   Is there a successful TCM telephone encounter documented?  No   CHF Week 1 attempt successful?  No   Unsuccessful attempts  Attempt 1          Samantha Kay, RN

## 2019-05-23 ENCOUNTER — READMISSION MANAGEMENT (OUTPATIENT)
Dept: CALL CENTER | Facility: HOSPITAL | Age: 79
End: 2019-05-23

## 2019-05-23 NOTE — OUTREACH NOTE
CHF Week 1 Survey      Responses   Facility patient discharged from?  [place]   Does the patient have one of the following disease processes/diagnoses(primary or secondary)?  CHF   Is there a successful TCM telephone encounter documented?  No   CHF Week 1 attempt successful?  Yes   Call start time  1414   Call end time  1430   Discharge diagnosis  CHF,  AFIB/RVR,  PE   Is patient permission given to speak with other caregiver?  Yes   List who call center can speak with  wife   Person spoke with today (if not patient) and relationship  Wife   Meds reviewed with patient/caregiver?  Yes   Is the patient having any side effects they believe may be caused by any medication additions or changes?  No   Does the patient have all medications ordered at discharge?  Yes   Is the patient taking all medications as directed (includes completed medication regime)?  Yes   Does the patient have a primary care provider?   Yes   Does the patient have an appointment with their PCP within 7 days of discharge?  Yes   Has the patient kept scheduled appointments due by today?  Yes   Psychosocial issues?  No   Did the patient receive a copy of their discharge instructions?  Yes   Nursing interventions  Reviewed instructions with patient   What is the patient's perception of their health status since discharge?  Same   Nursing interventions  Nurse provided patient education   Is the patient weighing daily?  Yes   Does the patient have scales?  Yes   Daily weight interventions  Education provided on importance of daily weight   Is the patient able to teach back Heart Failure diet management?  Yes   Is the patient able to teach back Heart Failure Zones?  Yes   Is the patient able to teach back signs and symptoms of worsening condition? (i.e. weight gain, shortness of air, etc.)  Yes   Is the patient/caregiver able to teach back the hierarchy of who to call/visit for symptoms/problems? PCP, Specialist, Home health nurse, Urgent Care, ED, 911   Yes    CHF Week 1 call completed?  Yes   Wrap up additional comments   pt has cancer. Pt having trouble sleeping. Has call into doctor.          Beulah Smith RN

## 2019-05-28 ENCOUNTER — HOSPITAL ENCOUNTER (OUTPATIENT)
Dept: CT IMAGING | Facility: HOSPITAL | Age: 79
Discharge: HOME | End: 2019-05-28
Admitting: INTERNAL MEDICINE

## 2019-05-28 ENCOUNTER — HOSPITAL ENCOUNTER (OUTPATIENT)
Dept: CT IMAGING | Facility: HOSPITAL | Age: 79
Discharge: HOME | End: 2019-05-28

## 2019-05-28 DIAGNOSIS — C83.89: ICD-10-CM

## 2019-05-28 PROCEDURE — A9552 F18 FDG: HCPCS | Performed by: INTERNAL MEDICINE

## 2019-05-28 PROCEDURE — 0 FLUDEOXYGLUCOSE F18 SOLUTION: Performed by: INTERNAL MEDICINE

## 2019-05-28 PROCEDURE — 78815 PET IMAGE W/CT SKULL-THIGH: CPT

## 2019-05-28 RX ADMIN — FLUDEOXYGLUCOSE F18 1 DOSE: 300 INJECTION INTRAVENOUS at 09:51

## 2019-05-31 ENCOUNTER — READMISSION MANAGEMENT (OUTPATIENT)
Dept: CALL CENTER | Facility: HOSPITAL | Age: 79
End: 2019-05-31

## 2019-05-31 NOTE — OUTREACH NOTE
CHF Week 2 Survey      Responses   Facility patient discharged from?  [place]   Does the patient have one of the following disease processes/diagnoses(primary or secondary)?  CHF   Week 2 attempt successful?  Yes   Call start time  1612   Call end time  1618   Discharge diagnosis  CHF,  AFIB/RVR,  PE   Meds reviewed with patient/caregiver?  Yes   Is the patient having any side effects they believe may be caused by any medication additions or changes?  No   Does the patient have all medications ordered at discharge?  Yes   Is the patient taking all medications as directed (includes completed medication regime)?  Yes   Does the patient have a primary care provider?   Yes   Does the patient have an appointment with their PCP within 7 days of discharge?  Yes   Has the patient kept scheduled appointments due by today?  Yes   Has home health visited the patient within 72 hours of discharge?  N/A   Psychosocial issues?  No   Did the patient receive a copy of their discharge instructions?  Yes   Nursing interventions  Reviewed instructions with patient   What is the patient's perception of their health status since discharge?  Improving   Nursing interventions  Nurse provided patient education   Is the patient weighing daily?  Yes   Does the patient have scales?  Yes   Daily weight interventions  Education provided on importance of daily weight   Is the patient able to teach back Heart Failure diet management?  Yes   Is the patient able to teach back Heart Failure Zones?  Yes   Is the patient able to teach back signs and symptoms of worsening condition? (i.e. weight gain, shortness of air, etc.)  Yes   Is the patient/caregiver able to teach back the hierarchy of who to call/visit for symptoms/problems? PCP, Specialist, Home health nurse, Urgent Care, ED, 911  Yes   CHF Week 2 call completed?  Yes          Toribio Quezada RN

## 2019-06-03 ENCOUNTER — TRANSCRIBE ORDERS (OUTPATIENT)
Dept: ADMINISTRATIVE | Facility: HOSPITAL | Age: 79
End: 2019-06-03

## 2019-06-03 ENCOUNTER — HOSPITAL ENCOUNTER (OUTPATIENT)
Age: 79
Discharge: HOME | End: 2019-06-03
Payer: MEDICARE

## 2019-06-03 DIAGNOSIS — C83.39 DIFFUSE LARGE B-CELL LYMPHOMA OF EXTRANODAL SITE (HCC): Primary | ICD-10-CM

## 2019-06-04 ENCOUNTER — APPOINTMENT (OUTPATIENT)
Dept: LAB | Facility: HOSPITAL | Age: 79
End: 2019-06-04

## 2019-06-04 ENCOUNTER — OFFICE VISIT (OUTPATIENT)
Dept: CARDIOLOGY | Facility: CLINIC | Age: 79
End: 2019-06-04

## 2019-06-04 VITALS
HEART RATE: 84 BPM | HEIGHT: 74 IN | WEIGHT: 151 LBS | SYSTOLIC BLOOD PRESSURE: 110 MMHG | BODY MASS INDEX: 19.38 KG/M2 | DIASTOLIC BLOOD PRESSURE: 60 MMHG | RESPIRATION RATE: 18 BRPM

## 2019-06-04 DIAGNOSIS — I48.19 ATRIAL FIBRILLATION, PERSISTENT (HCC): Primary | ICD-10-CM

## 2019-06-04 DIAGNOSIS — E11.8 TYPE 2 DIABETES MELLITUS WITH COMPLICATION, WITHOUT LONG-TERM CURRENT USE OF INSULIN (HCC): ICD-10-CM

## 2019-06-04 DIAGNOSIS — C83.30 DIFFUSE LARGE B-CELL LYMPHOMA, UNSPECIFIED BODY REGION (HCC): ICD-10-CM

## 2019-06-04 DIAGNOSIS — I50.22 CHRONIC SYSTOLIC CHF (CONGESTIVE HEART FAILURE), NYHA CLASS 3 (HCC): ICD-10-CM

## 2019-06-04 LAB
ANION GAP SERPL CALCULATED.3IONS-SCNC: 7 MMOL/L (ref 4–13)
BUN BLD-MCNC: 33 MG/DL (ref 5–21)
BUN/CREAT SERPL: 43.4 (ref 7–25)
CALCIUM SPEC-SCNC: 9.7 MG/DL (ref 8.4–10.4)
CHLORIDE SERPL-SCNC: 97 MMOL/L (ref 98–110)
CO2 SERPL-SCNC: 27 MMOL/L (ref 24–31)
CREAT BLD-MCNC: 0.76 MG/DL (ref 0.5–1.4)
GFR SERPL CREATININE-BSD FRML MDRD: 99 ML/MIN/1.73
GLUCOSE BLD-MCNC: 75 MG/DL (ref 70–100)
POTASSIUM BLD-SCNC: 4.4 MMOL/L (ref 3.5–5.3)
SODIUM BLD-SCNC: 131 MMOL/L (ref 135–145)

## 2019-06-04 PROCEDURE — 93000 ELECTROCARDIOGRAM COMPLETE: CPT | Performed by: NURSE PRACTITIONER

## 2019-06-04 PROCEDURE — 36415 COLL VENOUS BLD VENIPUNCTURE: CPT

## 2019-06-04 PROCEDURE — 80048 BASIC METABOLIC PNL TOTAL CA: CPT | Performed by: NURSE PRACTITIONER

## 2019-06-04 PROCEDURE — 99214 OFFICE O/P EST MOD 30 MIN: CPT | Performed by: NURSE PRACTITIONER

## 2019-06-04 NOTE — PROGRESS NOTES
"    Subjective:     Encounter Date:06/04/2019      Patient ID: Aram Nunez is a 79 y.o. male.    Chief Complaint:  The patient reports he is feeling fair overall. He has continued weakness, occasional dizziness (especially with position changes), fatigue, and insomnia. He states he was prescribed trazodone for sleep per his PCP, but he has not taken this yet. He reports chronic dyspnea on exertion, but his volume status has improved significantly since his hospital admission for CHF and afib, rvr. He denies orthopnea, PND, sudden weight gain or abdominal distension. He has mild ankle and pedal edema. He reports he is currently in remission and there are currently no plans for more chemo or cancer treatments. CBC yesterday per Dr. Macdonald is stable. He denies palpitations or chest pain. His blood pressure remains low normal.         The following portions of the patient's history were reviewed and updated as appropriate: allergies, current medications, past family history, past medical history, past social history, past surgical history and problem list.  /60 (BP Location: Right arm, Patient Position: Sitting, Cuff Size: Adult)   Pulse 84   Resp 18   Ht 188 cm (74\")   Wt 68.5 kg (151 lb)   BMI 19.39 kg/m²   No Known Allergies    Current Outpatient Medications:   •  apixaban (ELIQUIS) 5 MG tablet tablet, Take 1 tablet by mouth Every 12 (Twelve) Hours., Disp: 60 tablet, Rfl: 2  •  B Complex Vitamins (VITAMIN B COMPLEX) capsule capsule, Take 1 capsule by mouth Daily., Disp: , Rfl:   •  digoxin (LANOXIN) 125 MCG tablet, Take 1 tablet by mouth Daily., Disp: 30 tablet, Rfl: 2  •  diltiaZEM CD (CARDIZEM CD) 360 MG 24 hr capsule, Take 1 capsule by mouth Daily., Disp: 30 capsule, Rfl: 2  •  finasteride (PROSCAR) 5 MG tablet, Take 5 mg by mouth Daily., Disp: , Rfl:   •  furosemide (LASIX) 20 MG tablet, Take 20 mg by mouth Daily., Disp: , Rfl:   •  gabapentin (NEURONTIN) 100 MG capsule, Take 100 mg by mouth 3 " (Three) Times a Day., Disp: , Rfl:   •  HYDROcodone-acetaminophen (NORCO) 7.5-325 MG per tablet, Take 1 tablet by mouth Every 8 (Eight) Hours As Needed for Moderate Pain ., Disp: 10 tablet, Rfl: 0  •  Lidocaine-Prilocaine, Bulk, 2.5-2.5 % cream, Apply 1 application topically to the appropriate area as directed As Needed (APPLY TO AREA OF PORT 30 MIN- 1 HR PRIOR TO PORT ACCESS)., Disp: , Rfl:   •  loratadine (CLARITIN) 10 MG tablet, Take 10 mg by mouth Daily., Disp: , Rfl:   •  megestrol (MEGACE) 40 MG/ML suspension, Take 400 mg by mouth Daily., Disp: , Rfl:   •  melatonin 5 MG tablet tablet, Take 5 mg by mouth Every Night., Disp: , Rfl:   •  metFORMIN (GLUCOPHAGE) 500 MG tablet, Take 1,000 mg by mouth 2 (Two) Times a Day With Meals., Disp: , Rfl:   •  metoprolol tartrate (LOPRESSOR) 25 MG tablet, Take 0.5 tablets by mouth Every 12 (Twelve) Hours., Disp: 60 tablet, Rfl: 2  •  pantoprazole (PROTONIX) 40 MG EC tablet, Take 40 mg by mouth 2 (Two) Times a Day., Disp: , Rfl:   •  polyethylene glycol (MIRALAX) packet, Take 17 g by mouth Daily As Needed (constipation)., Disp: , Rfl:   •  potassium chloride (K-DUR,KLOR-CON) 20 MEQ CR tablet, Take 20 mEq by mouth Daily., Disp: , Rfl:   Past Medical History:   Diagnosis Date   • Arthritis    • Cancer (CMS/HCC)     skin   • Diabetes mellitus (CMS/HCC)     borderline, no medication   • Disease of thyroid gland    • Dysrhythmia    • HL (hearing loss)    • Hyperlipidemia    • Hypertension    • IBS (irritable bowel syndrome)    • Lung mass 12/24/2018   • Neuropathy    • Non Hodgkin's lymphoma (CMS/HCC)      Past Surgical History:   Procedure Laterality Date   • CARDIAC CATHETERIZATION     • PARATHYROID GLAND SURGERY     • PROSTATE SURGERY      PROSTATECTOMY   • SKIN CANCER EXCISION       Social History     Socioeconomic History   • Marital status:      Spouse name: Not on file   • Number of children: Not on file   • Years of education: Not on file   • Highest education  level: Not on file   Tobacco Use   • Smoking status: Never Smoker   • Smokeless tobacco: Never Used   Substance and Sexual Activity   • Alcohol use: No   • Drug use: No   • Sexual activity: Defer     Family History   Problem Relation Age of Onset   • Heart disease Mother    • Cancer Father    • Heart disease Brother        Review of Systems   Constitution: Positive for weakness and malaise/fatigue. Negative for chills, diaphoresis and fever.   HENT: Negative for nosebleeds.    Eyes: Negative for visual disturbance.   Cardiovascular: Positive for dyspnea on exertion and leg swelling (mild pedal edema, improved ). Negative for chest pain, claudication, cyanosis, irregular heartbeat, near-syncope, orthopnea, palpitations, paroxysmal nocturnal dyspnea and syncope.   Respiratory: Negative for cough, hemoptysis, shortness of breath, sputum production and wheezing.    Hematologic/Lymphatic: Negative for bleeding problem.   Skin: Negative for color change and flushing.   Musculoskeletal: Negative for falls.   Gastrointestinal: Negative for bloating, abdominal pain, hematemesis, hematochezia, melena, nausea and vomiting.   Genitourinary: Negative for hematuria.   Neurological: Positive for dizziness.   Psychiatric/Behavioral: Negative for altered mental status. The patient has insomnia.          ECG 12 Lead  Date/Time: 6/4/2019 11:24 AM  Performed by: Perri Dill APRN  Authorized by: Perri Dill APRN   Comparison: compared with previous ECG from 5/15/2019  Similar to previous ECG  Comparison to previous ECG: Anterior lateral T wave inversions, diffuse non specific T wave abnormalities   Rhythm: atrial fibrillation  Ectopy: unifocal PVCs               Objective:     Physical Exam   Constitutional: He is oriented to person, place, and time. He appears well-developed. He has a sickly appearance (Frail appearing ). No distress.   HENT:   Head: Normocephalic and atraumatic.   Eyes: Pupils are equal, round, and reactive to  light.   Neck: Normal range of motion. Neck supple. No JVD present. No thyromegaly present.   Cardiovascular: Normal rate, normal heart sounds and intact distal pulses. An irregularly irregular rhythm present. Exam reveals no gallop and no friction rub.   No murmur heard.  Pulmonary/Chest: Effort normal and breath sounds normal. No respiratory distress. He has no wheezes. He has no rales. He exhibits no tenderness.   Abdominal: Soft. Bowel sounds are normal. He exhibits no distension. There is no tenderness.   Musculoskeletal: Normal range of motion. He exhibits edema (2+ ankle and pedal edema ).   Neurological: He is alert and oriented to person, place, and time. No cranial nerve deficit.   Skin: Skin is warm and dry. He is not diaphoretic. There is pallor.   Psychiatric: He has a normal mood and affect. His behavior is normal.       Lab Review:       Assessment:          Diagnosis Plan   1. Atrial fibrillation, persistent (CMS/HCC)  Schedule cardioversion around 6/12 (after 4 weeks of uninterrupted anticoagulation )  Now rate controlled with lopressor, Cardizem, and digoxin   Anticoagulated with Eliquis         2. Chronic systolic CHF (congestive heart failure), NYHA class 3 (CMS/[facility])    Stage C  LVEF 41-45%  Currently compensated  Check BMP today   Likely driven by afib rvr, resulting in his recent heart failure admission      3. Diffuse large B-cell lymphoma, unspecified body region (CMS/[facility])    Followed by oncology    4. Type 2 diabetes mellitus with complication, without long-term current use of insulin (CMS/[facility])  Followed by PCP           Plan:       Check BMP today - will adjust lasix and potassium pending results, if needed  Schedule cardioversion with Dr. Meyer for 6/12 or after  Continue Eliquis, digoxin, cardizem, lasix, K, and lopressor (will plant to transition to toprol xl at follow up) at current doses  Will hold off on adding ACEI or ARB given his hypotension and dizziness  Will tentatively  schedule next follow up for 1 month, sooner with symptoms or concerns     Reviewed signs and symptoms of CHF and what to report with the patient. Patient instructed to restrict sodium and weigh daily. Report weight gain of greater than 2 lbs overnight or 5 lbs in 1 week. Pt verbalized understanding of instructions and plan of care.

## 2019-06-07 ENCOUNTER — READMISSION MANAGEMENT (OUTPATIENT)
Dept: CALL CENTER | Facility: HOSPITAL | Age: 79
End: 2019-06-07

## 2019-06-07 NOTE — OUTREACH NOTE
CHF Week 3 Survey      Responses   Facility patient discharged from?  [place]   Does the patient have one of the following disease processes/diagnoses(primary or secondary)?  CHF   Week 3 attempt successful?  No   Unsuccessful attempts  Attempt 1          Luna Riley RN

## 2019-06-10 ENCOUNTER — READMISSION MANAGEMENT (OUTPATIENT)
Dept: CALL CENTER | Facility: HOSPITAL | Age: 79
End: 2019-06-10

## 2019-06-10 NOTE — OUTREACH NOTE
CHF Week 3 Survey      Responses   Facility patient discharged from?  [place]   Does the patient have one of the following disease processes/diagnoses(primary or secondary)?  CHF   Week 3 attempt successful?  Yes   Call start time  1444   Call end time  1450   General alerts for this patient  Afib To go for cardioversion   List who call center can speak with  wife   Person spoke with today (if not patient) and relationship  Wife   Meds reviewed with patient/caregiver?  Yes   Is the patient taking all medications as directed (includes completed medication regime)?  Yes   Comments regarding appointments  Has seen PCP and cardiology   Does the patient have a primary care provider?   Yes   Has the patient kept scheduled appointments due by today?  Yes   What is the patient's perception of their health status since discharge?  Improving   Is the patient weighing daily?  Yes   Is the patient able to teach back Heart Failure Zones?  Yes   Additional teach back comments  Will cardiovert him Thursday , was on chemo   CHF Week 3 call completed?  Yes   Wrap up additional comments  will be cardioverted Thursday he is in AFIB          Gina Salas RN

## 2019-06-11 RX ORDER — FUROSEMIDE 20 MG/1
20 TABLET ORAL DAILY
Qty: 30 TABLET | Refills: 11 | Status: SHIPPED | OUTPATIENT
Start: 2019-06-11 | End: 2019-12-06

## 2019-06-13 ENCOUNTER — HOSPITAL ENCOUNTER (OUTPATIENT)
Dept: CARDIOLOGY | Facility: HOSPITAL | Age: 79
Setting detail: HOSPITAL OUTPATIENT SURGERY
Discharge: HOME | End: 2019-06-13
Admitting: INTERNAL MEDICINE

## 2019-06-13 ENCOUNTER — ANESTHESIA (OUTPATIENT)
Dept: CARDIOLOGY | Facility: HOSPITAL | Age: 79
End: 2019-06-13

## 2019-06-13 ENCOUNTER — ANESTHESIA EVENT (OUTPATIENT)
Dept: CARDIOLOGY | Facility: HOSPITAL | Age: 79
End: 2019-06-13

## 2019-06-13 VITALS
HEIGHT: 74 IN | SYSTOLIC BLOOD PRESSURE: 130 MMHG | HEART RATE: 54 BPM | OXYGEN SATURATION: 100 % | WEIGHT: 140.44 LBS | RESPIRATION RATE: 15 BRPM | DIASTOLIC BLOOD PRESSURE: 63 MMHG | BODY MASS INDEX: 18.02 KG/M2

## 2019-06-13 VITALS
DIASTOLIC BLOOD PRESSURE: 57 MMHG | OXYGEN SATURATION: 100 % | SYSTOLIC BLOOD PRESSURE: 118 MMHG | HEART RATE: 52 BPM | RESPIRATION RATE: 20 BRPM

## 2019-06-13 DIAGNOSIS — I48.19 ATRIAL FIBRILLATION, PERSISTENT (HCC): ICD-10-CM

## 2019-06-13 PROCEDURE — 93010 ELECTROCARDIOGRAM REPORT: CPT | Performed by: INTERNAL MEDICINE

## 2019-06-13 PROCEDURE — 92960 CARDIOVERSION ELECTRIC EXT: CPT

## 2019-06-13 PROCEDURE — 93005 ELECTROCARDIOGRAM TRACING: CPT | Performed by: INTERNAL MEDICINE

## 2019-06-13 PROCEDURE — 92960 CARDIOVERSION ELECTRIC EXT: CPT | Performed by: INTERNAL MEDICINE

## 2019-06-13 PROCEDURE — 25010000002 PROPOFOL 10 MG/ML EMULSION: Performed by: NURSE ANESTHETIST, CERTIFIED REGISTERED

## 2019-06-13 RX ORDER — SODIUM CHLORIDE 0.9 % (FLUSH) 0.9 %
5 SYRINGE (ML) INJECTION AS NEEDED
Status: DISCONTINUED | OUTPATIENT
Start: 2019-06-13 | End: 2019-06-14 | Stop reason: HOSPADM

## 2019-06-13 RX ORDER — TRAZODONE HYDROCHLORIDE 50 MG/1
50 TABLET ORAL NIGHTLY
COMMUNITY
End: 2019-12-06

## 2019-06-13 RX ORDER — PROPOFOL 10 MG/ML
VIAL (ML) INTRAVENOUS AS NEEDED
Status: DISCONTINUED | OUTPATIENT
Start: 2019-06-13 | End: 2019-06-13 | Stop reason: SURG

## 2019-06-13 RX ORDER — SODIUM CHLORIDE 9 MG/ML
50 INJECTION, SOLUTION INTRAVENOUS CONTINUOUS
Status: DISCONTINUED | OUTPATIENT
Start: 2019-06-13 | End: 2019-06-14 | Stop reason: HOSPADM

## 2019-06-13 RX ORDER — SODIUM CHLORIDE 0.9 % (FLUSH) 0.9 %
3 SYRINGE (ML) INJECTION EVERY 12 HOURS SCHEDULED
Status: DISCONTINUED | OUTPATIENT
Start: 2019-06-13 | End: 2019-06-14 | Stop reason: HOSPADM

## 2019-06-13 RX ADMIN — PROPOFOL 60 MG: 10 INJECTION, EMULSION INTRAVENOUS at 14:20

## 2019-06-13 NOTE — ANESTHESIA POSTPROCEDURE EVALUATION
"Patient: Aram Nunez    Procedure Summary     Date:  06/13/19 Room / Location:  [facility] CATH LAB    Anesthesia Start:  1416 Anesthesia Stop:  1424    Procedure:  CARDIOVERSION EXTERNAL IN CARDIOLOGY DEPARTMENT Diagnosis:       Atrial fibrillation, persistent (CMS/HCC)      (atrial fibrillation)    Scheduled Providers:  Chris Meyer MD Provider:  Loco Ortiz CRNA    Anesthesia Type:  MAC ASA Status:  3          Anesthesia Type: MAC  Last vitals  BP   114/73 (06/13/19 1237)   Temp       Pulse   56 (06/13/19 1237)   Resp   18 (06/13/19 1237)     SpO2   100 % (06/13/19 1237)     Post Anesthesia Care and Evaluation    Patient location during evaluation: PACU  Patient participation: complete - patient participated  Level of consciousness: awake and alert  Pain score: 0  Pain management: adequate  Airway patency: patent  Anesthetic complications: No anesthetic complications    Cardiovascular status: acceptable  Respiratory status: acceptable  Hydration status: acceptable    Comments: Blood pressure 114/73, pulse 56, resp. rate 18, height 188 cm (74\"), weight 63.7 kg (140 lb 7 oz), SpO2 100 %.    Pt discharged from PACU based on mechelle score >8      "

## 2019-06-13 NOTE — ANESTHESIA PREPROCEDURE EVALUATION
Anesthesia Evaluation     Patient summary reviewed and Nursing notes reviewed   NPO Solid Status: > 8 hours  NPO Liquid Status: > 8 hours           Airway   Mallampati: I  TM distance: >3 FB  Neck ROM: full  No difficulty expected  Dental      Pulmonary    (+) pleural effusion,   (-) sleep apnea  Cardiovascular   Exercise tolerance: good (4-7 METS)    Patient on routine beta blocker and Beta blocker given within 24 hours of surgery    (+) hypertension, dysrhythmias Atrial Fib, CHF, hyperlipidemia,     ROS comment: Admitted 5/15/19 with afib with RVR, volume overload    Echo  · Left ventricular systolic function is mildly decreased. Estimated EF appears to be in the range of 41 - 45%.  · Mild mitral valve regurgitation is present  · Trace tricuspid valve regurgitation is present. Estimated right ventricular systolic pressure from tricuspid regurgitation is normal (<35 mmHg).  · There is a small (<1cm) pericardial effusion.    Neuro/Psych  (-) seizures, TIA, CVA  GI/Hepatic/Renal/Endo    (+)   diabetes mellitus,   (-) liver disease, no renal disease    Musculoskeletal     Abdominal    Substance History      OB/GYN          Other   (+) arthritis   history of cancer (B cell lymphoma, s/p chemo in remission as of april 2019) active                  Anesthesia Plan    ASA 3     MAC     intravenous induction   Anesthetic plan, all risks, benefits, and alternatives have been provided, discussed and informed consent has been obtained with: patient.

## 2019-06-19 ENCOUNTER — READMISSION MANAGEMENT (OUTPATIENT)
Dept: CALL CENTER | Facility: HOSPITAL | Age: 79
End: 2019-06-19

## 2019-06-19 NOTE — OUTREACH NOTE
CHF Week 4 Survey      Responses   Facility patient discharged from?  [facility]   Does the patient have one of the following disease processes/diagnoses(primary or secondary)?  CHF   Week 4 attempt successful?  Yes   Call start time  1146   Call end time  1222   General alerts for this patient  had cardioversion   Discharge diagnosis  CHF,  AFIB/RVR,  PE   Person spoke with today (if not patient) and relationship  Wife   Meds reviewed with patient/caregiver?  Yes   Is the patient having any side effects they believe may be caused by any medication additions or changes?  No   Is the patient taking all medications as directed (includes completed medication regime)?  Yes   Medication comments  wife is unsure which medications have changed   Has the patient kept scheduled appointments due by today?  Yes   Is the patient still receiving Home Health Services?  N/A   Psychosocial issues?  No   What is the patient's perception of their health status since discharge?  Improving   Nursing interventions  Nurse provided patient education   Is the patient weighing daily?  Yes   Does the patient have scales?  Yes   Daily weight interventions  Education provided on importance of daily weight   Is the patient able to teach back Heart Failure diet management?  Yes   Is the patient able to teach back Heart Failure Zones?  Yes   Is the patient able to teach back signs and symptoms of worsening condition? (i.e. weight gain, shortness of air, etc.)  Yes   Is the patient/caregiver able to teach back the hierarchy of who to call/visit for symptoms/problems? PCP, Specialist, Home health nurse, Urgent Care, ED, 911  Yes   Week 4 Call Completed?  Yes   Would the patient like one additional call?  No   Graduated  Yes   Did the patient feel the follow up calls were helpful during their recovery period?  Yes          Neena Chin RN

## 2019-07-16 ENCOUNTER — HOSPITAL ENCOUNTER (OUTPATIENT)
Dept: INFUSION THERAPY | Age: 79
Discharge: HOME | End: 2019-07-16
Payer: MEDICARE

## 2019-07-16 ENCOUNTER — HOSPITAL ENCOUNTER (OUTPATIENT)
Dept: GENERAL RADIOLOGY | Facility: HOSPITAL | Age: 79
Discharge: HOME | End: 2019-07-16
Admitting: INTERNAL MEDICINE

## 2019-07-16 ENCOUNTER — OFFICE VISIT (OUTPATIENT)
Dept: CARDIOLOGY | Facility: CLINIC | Age: 79
End: 2019-07-16

## 2019-07-16 ENCOUNTER — HOSPITAL ENCOUNTER (OUTPATIENT)
Dept: CT IMAGING | Facility: HOSPITAL | Age: 79
Discharge: HOME | End: 2019-07-16

## 2019-07-16 ENCOUNTER — TRANSCRIBE ORDERS (OUTPATIENT)
Dept: ADMINISTRATIVE | Facility: HOSPITAL | Age: 79
End: 2019-07-16

## 2019-07-16 VITALS
RESPIRATION RATE: 18 BRPM | BODY MASS INDEX: 19.38 KG/M2 | HEART RATE: 82 BPM | WEIGHT: 151 LBS | DIASTOLIC BLOOD PRESSURE: 75 MMHG | OXYGEN SATURATION: 98 % | SYSTOLIC BLOOD PRESSURE: 118 MMHG | HEIGHT: 74 IN

## 2019-07-16 DIAGNOSIS — I48.0 PAF (PAROXYSMAL ATRIAL FIBRILLATION) (HCC): ICD-10-CM

## 2019-07-16 DIAGNOSIS — Z79.01 CURRENT USE OF LONG TERM ANTICOAGULATION: ICD-10-CM

## 2019-07-16 DIAGNOSIS — C83.30 DIFFUSE LARGE B-CELL LYMPHOMA, UNSPECIFIED BODY REGION (HCC): Chronic | ICD-10-CM

## 2019-07-16 DIAGNOSIS — C83.39 DIFFUSE LARGE B-CELL LYMPHOMA OF EXTRANODAL SITE (HCC): ICD-10-CM

## 2019-07-16 DIAGNOSIS — I50.22 CHRONIC SYSTOLIC HEART FAILURE (HCC): ICD-10-CM

## 2019-07-16 DIAGNOSIS — R07.89 CHEST PAIN, ATYPICAL: Primary | ICD-10-CM

## 2019-07-16 DIAGNOSIS — D64.9 ANEMIA, UNSPECIFIED TYPE: ICD-10-CM

## 2019-07-16 DIAGNOSIS — R07.9 CHEST PAIN, UNSPECIFIED TYPE: ICD-10-CM

## 2019-07-16 DIAGNOSIS — R07.9 CHEST PAIN, UNSPECIFIED TYPE: Primary | ICD-10-CM

## 2019-07-16 LAB — CREAT BLDA-MCNC: 0.9 MG/DL (ref 0.6–1.3)

## 2019-07-16 PROCEDURE — 93000 ELECTROCARDIOGRAM COMPLETE: CPT | Performed by: NURSE PRACTITIONER

## 2019-07-16 PROCEDURE — 99214 OFFICE O/P EST MOD 30 MIN: CPT | Performed by: NURSE PRACTITIONER

## 2019-07-16 PROCEDURE — 25010000002 IOPAMIDOL 61 % SOLUTION: Performed by: INTERNAL MEDICINE

## 2019-07-16 PROCEDURE — 85025 COMPLETE CBC W/AUTO DIFF WBC: CPT

## 2019-07-16 PROCEDURE — 74177 CT ABD & PELVIS W/CONTRAST: CPT

## 2019-07-16 PROCEDURE — 82565 ASSAY OF CREATININE: CPT

## 2019-07-16 PROCEDURE — 71260 CT THORAX DX C+: CPT

## 2019-07-16 PROCEDURE — 80053 COMPREHEN METABOLIC PANEL: CPT

## 2019-07-16 PROCEDURE — 71046 X-RAY EXAM CHEST 2 VIEWS: CPT

## 2019-07-16 PROCEDURE — 36415 COLL VENOUS BLD VENIPUNCTURE: CPT

## 2019-07-16 PROCEDURE — 83615 LACTATE (LD) (LDH) ENZYME: CPT

## 2019-07-16 RX ADMIN — IOPAMIDOL 100 ML: 612 INJECTION, SOLUTION INTRAVENOUS at 13:11

## 2019-07-16 NOTE — PROGRESS NOTES
"    Subjective:     Encounter Date:07/16/2019      Patient ID: Aram Nunez is a 79 y.o. male with a history of chronic systolic/diastolic heart failure, PAF with recent cardioversion last month, long term anticoagulation with Eliquis, non-Hodgkins lymphoma, chronic anemia, and DM. He presents today for follow up after recent cardioversion and new complaints of chest and back pain.     Chief Complaint: Chest/back pain  History of Present Illness  Chest pain: positional. Started 7-10 days ago. Cannot sleep well. Feels like it did when he had an identified tumor in the past. Palpitation does not reproduce pain.     Back pain: not reproduced with palpation. Pain is sharp in the left scapula region. Worse with certain positions. Worse being upright and walking.     Chills: complains of chills and feeling \"cold\" recently    AF: asymptomatic with rhythm change. Now back out of rhythm and uncertain when this occurred. Rates have been well controlled. Denies palpitations.    Urinary frequency: worse at night. Due to hx of prostate problems. Cannot tolerate medications well due to dizziness    Dizziness: worse when upright. Associated with the new pain and discomfort in chest and back in last 7-10 days. Not sleeping well. Cannot get comfortable.    The following portions of the patient's history were reviewed and updated as appropriate: allergies, current medications, past family history, past medical history, past social history and past surgical history.     No Known Allergies      Current Outpatient Medications:   •  apixaban (ELIQUIS) 5 MG tablet tablet, Take 1 tablet by mouth Every 12 (Twelve) Hours., Disp: 60 tablet, Rfl: 2  •  B Complex Vitamins (VITAMIN B COMPLEX) capsule capsule, Take 1 capsule by mouth Daily., Disp: , Rfl:   •  digoxin (LANOXIN) 125 MCG tablet, Take 1 tablet by mouth Daily., Disp: 30 tablet, Rfl: 2  •  finasteride (PROSCAR) 5 MG tablet, Take 5 mg by mouth Daily., Disp: , Rfl:   •  furosemide " (LASIX) 20 MG tablet, Take 1 tablet by mouth Daily., Disp: 30 tablet, Rfl: 11  •  Lidocaine-Prilocaine, Bulk, 2.5-2.5 % cream, Apply 1 application topically to the appropriate area as directed As Needed (APPLY TO AREA OF PORT 30 MIN- 1 HR PRIOR TO PORT ACCESS)., Disp: , Rfl:   •  loratadine (CLARITIN) 10 MG tablet, Take 10 mg by mouth Daily., Disp: , Rfl:   •  megestrol (MEGACE) 40 MG/ML suspension, Take 400 mg by mouth Daily., Disp: , Rfl:   •  melatonin 5 MG tablet tablet, Take 5 mg by mouth Every Night., Disp: , Rfl:   •  metFORMIN (GLUCOPHAGE) 1000 MG tablet, Take 1,000 mg by mouth 2 (Two) Times a Day With Meals., Disp: , Rfl:   •  metoprolol tartrate (LOPRESSOR) 25 MG tablet, Take 0.5 tablets by mouth Every 12 (Twelve) Hours., Disp: 60 tablet, Rfl: 2  •  pantoprazole (PROTONIX) 40 MG EC tablet, Take 40 mg by mouth 2 (Two) Times a Day., Disp: , Rfl:   •  polyethylene glycol (MIRALAX) packet, Take 17 g by mouth Daily As Needed (constipation)., Disp: , Rfl:   •  potassium chloride (K-DUR,KLOR-CON) 20 MEQ CR tablet, Take 20 mEq by mouth Daily., Disp: , Rfl:   •  traZODone (DESYREL) 50 MG tablet, Take 50 mg by mouth Every Night., Disp: , Rfl: ]    Past Medical History:   Diagnosis Date   • Arthritis    • Cancer (CMS/HCC)     skin   • Diabetes mellitus (CMS/HCC)     borderline, no medication   • Disease of thyroid gland    • Dysrhythmia    • HL (hearing loss)    • Hyperlipidemia    • Hypertension    • IBS (irritable bowel syndrome)    • Lung mass 12/24/2018   • Neuropathy    • Non Hodgkin's lymphoma (CMS/HCC)      Family History   Problem Relation Age of Onset   • Heart disease Mother    • Cancer Father    • Heart disease Brother      Social History     Socioeconomic History   • Marital status:      Spouse name: Not on file   • Number of children: Not on file   • Years of education: Not on file   • Highest education level: Not on file   Tobacco Use   • Smoking status: Never Smoker   • Smokeless tobacco: Never  "Used   Substance and Sexual Activity   • Alcohol use: No   • Drug use: No   • Sexual activity: Defer     Past Surgical History:   Procedure Laterality Date   • CARDIAC CATHETERIZATION     • PARATHYROID GLAND SURGERY     • PROSTATE SURGERY      PROSTATECTOMY   • SKIN CANCER EXCISION       Review of Systems   Constitution: Positive for chills, weakness and malaise/fatigue. Negative for diaphoresis and fever.   Cardiovascular: Positive for chest pain. Negative for leg swelling, near-syncope, orthopnea, palpitations, paroxysmal nocturnal dyspnea and syncope.   Hematologic/Lymphatic: Negative for bleeding problem. Bruises/bleeds easily.   Skin: Negative for dry skin, flushing and itching.   Musculoskeletal: Positive for back pain.   Gastrointestinal: Negative for bloating, abdominal pain, nausea and vomiting.   Neurological: Positive for dizziness. Negative for focal weakness.   Psychiatric/Behavioral: The patient is nervous/anxious.          ECG 12 Lead  Date/Time: 7/16/2019 10:54 AM  Performed by: Kateryna Draper APRN  Authorized by: Kateryna Draper APRN   Comparison: compared with previous ECG from 6/13/2019  Rhythm: atrial fibrillation  Rate: normal  BPM: 82  Conduction: conduction normal  ST Segments: ST segments normal  T Waves: T waves normal    Clinical impression: abnormal EKG          /75 (BP Location: Right arm, Patient Position: Sitting, Cuff Size: Adult)   Pulse 82   Resp 18   Ht 188 cm (74\")   Wt 68.5 kg (151 lb)   SpO2 98%   BMI 19.39 kg/m²        Objective:     Physical Exam   Constitutional: He is oriented to person, place, and time. He appears well-developed. He appears cachectic. He is cooperative. He appears ill. He appears distressed (in acute pain).   HENT:   Head: Normocephalic and atraumatic.   Mouth/Throat: Oropharynx is clear and moist.   Eyes: Conjunctivae are normal.   Neck: Normal range of motion. Neck supple.   Cardiovascular: Normal rate and normal heart sounds. An irregularly " irregular rhythm present.   Pulmonary/Chest: Effort normal and breath sounds normal. No respiratory distress. He has no decreased breath sounds. He has no wheezes. He has no rhonchi. He has no rales.   Abdominal: Soft. Normal appearance. He exhibits no distension. There is no tenderness.   Musculoskeletal: He exhibits no edema.   Neurological: He is alert and oriented to person, place, and time.   Skin: Skin is warm, dry and intact. He is not diaphoretic. There is pallor.   Psychiatric: His mood appears anxious.       Lab Review:       Assessment:          Diagnosis Plan   1. Chest pain, atypical     2. PAF (paroxysmal atrial fibrillation) (CMS/HCC)     3. Chronic systolic heart failure (CMS/HCC)     4. Current use of long term anticoagulation     5. Anemia, unspecified type     6. Diffuse large B-cell lymphoma, unspecified body region (CMS/HCC)            Plan:       - chest pain: atypical features for angina. And is not associated with symptoms that sound as if it is related to his arrhythmia. I am concerned due to his previous complaints of chest pain with tumor findings that this is a recurrence of tumor. I advised him to go to urgent care today at a minimum. He is anxious that this is tumor related as well. I suggested that they call his oncologist to see if they can get direct orders for scans while they are here or proceed to urgent care. I have spoken with the daughter and wife who accompany the patient today.     - PAF: back in AF today. Anticoagulated and rate controlled. No changes at this time. We can address need for further management with repeat cardioversion or antiarrhythmics in follow up after acute pain is addressed.    - CHF: previously treated for systolic and diastolic heart failure at time of diagnosis of AF. Since that time this has been stable. He remains on low dose lasix every day with K supplement. This is appropriate given he is back out of rhythm. Continue same meds at this time.  Ideally we will change from Lopressor to Toprol. But given his pain and anxiety at this time defer to follow up.    - anticoagulation: remain on Eliquis for stroke risk reduction. He is concerned given his chills that this is a side effect. Discussed side effects of anemia but that this is likely not related to his anticoagulation and there is no better replacement drug for this. He may be experiencing chills related a new tumor, although, I am not sure that this is the case.     - anemia: last monitored by oncology and was stable. No changes at this time. No complaints of bleeding.     - lymphoma: hx of lymphoma with treatment. Was stable and had CT scans due in 2 months. Contact oncologist today or proceed to urgent care due to complaints of pain consistent with previous pain that was found to be related to tumor. He and his family verbalized understanding and are making a phone call now.    Oncology has ordered an x ray and will follow up with him. He is going over the the Imaging center after check out.     Follow up on cardiology needs in one month.

## 2019-07-17 RX ORDER — POTASSIUM CHLORIDE 20 MEQ/1
20 TABLET, EXTENDED RELEASE ORAL DAILY
Qty: 30 TABLET | Refills: 11 | Status: SHIPPED | OUTPATIENT
Start: 2019-07-17 | End: 2019-12-06

## 2019-07-19 ENCOUNTER — HOSPITAL ENCOUNTER (OUTPATIENT)
Dept: RADIATION ONCOLOGY | Facility: HOSPITAL | Age: 79
Discharge: HOME | End: 2019-07-19

## 2019-07-19 ENCOUNTER — HOSPITAL ENCOUNTER (OUTPATIENT)
Dept: RADIATION ONCOLOGY | Facility: HOSPITAL | Age: 79
Setting detail: RADIATION/ONCOLOGY SERIES
End: 2019-07-19

## 2019-07-19 ENCOUNTER — CONSULT (OUTPATIENT)
Dept: RADIATION ONCOLOGY | Facility: HOSPITAL | Age: 79
End: 2019-07-19

## 2019-07-19 VITALS — BODY MASS INDEX: 19.39 KG/M2 | SYSTOLIC BLOOD PRESSURE: 111 MMHG | WEIGHT: 151 LBS | DIASTOLIC BLOOD PRESSURE: 84 MMHG

## 2019-07-19 DIAGNOSIS — C83.30 DIFFUSE LARGE B-CELL LYMPHOMA, UNSPECIFIED BODY REGION (HCC): Primary | Chronic | ICD-10-CM

## 2019-07-19 DIAGNOSIS — Z78.9 NON-SMOKER: ICD-10-CM

## 2019-07-19 DIAGNOSIS — Z92.21 STATUS POST CHEMOTHERAPY: ICD-10-CM

## 2019-07-19 DIAGNOSIS — Z71.9 ENCOUNTER FOR CONSULTATION: ICD-10-CM

## 2019-07-19 DIAGNOSIS — G89.3 CANCER RELATED PAIN: ICD-10-CM

## 2019-07-19 PROCEDURE — 77290 THER RAD SIMULAJ FIELD CPLX: CPT | Performed by: RADIOLOGY

## 2019-07-19 PROCEDURE — 77334 RADIATION TREATMENT AID(S): CPT | Performed by: RADIOLOGY

## 2019-07-19 NOTE — PATIENT INSTRUCTIONS
1)  Simulation today for radiation  2)  We will call you with a time to start radiation  3)  We will ask for insurance authorization ASAP  4)  Take you pain medicine when you need it!

## 2019-07-22 ENCOUNTER — HOSPITAL ENCOUNTER (OUTPATIENT)
Dept: RADIATION ONCOLOGY | Facility: HOSPITAL | Age: 79
Discharge: HOME | End: 2019-07-22

## 2019-07-22 PROCEDURE — 77300 RADIATION THERAPY DOSE PLAN: CPT | Performed by: RADIOLOGY

## 2019-07-22 PROCEDURE — 77334 RADIATION TREATMENT AID(S): CPT | Performed by: RADIOLOGY

## 2019-07-22 PROCEDURE — 77417 THER RADIOLOGY PORT IMAGE(S): CPT | Performed by: RADIOLOGY

## 2019-07-22 PROCEDURE — 77412 RADIATION TX DELIVERY LVL 3: CPT | Performed by: RADIOLOGY

## 2019-07-22 PROCEDURE — 77295 3-D RADIOTHERAPY PLAN: CPT | Performed by: RADIOLOGY

## 2019-07-23 ENCOUNTER — HOSPITAL ENCOUNTER (OUTPATIENT)
Dept: RADIATION ONCOLOGY | Facility: HOSPITAL | Age: 79
Discharge: HOME | End: 2019-07-23

## 2019-07-23 PROCEDURE — 77412 RADIATION TX DELIVERY LVL 3: CPT | Performed by: RADIOLOGY

## 2019-07-24 ENCOUNTER — HOSPITAL ENCOUNTER (OUTPATIENT)
Dept: RADIATION ONCOLOGY | Facility: HOSPITAL | Age: 79
Discharge: HOME | End: 2019-07-24

## 2019-07-24 PROCEDURE — 77336 RADIATION PHYSICS CONSULT: CPT | Performed by: RADIOLOGY

## 2019-07-24 PROCEDURE — 77412 RADIATION TX DELIVERY LVL 3: CPT | Performed by: RADIOLOGY

## 2019-07-25 PROCEDURE — 77412 RADIATION TX DELIVERY LVL 3: CPT | Performed by: RADIOLOGY

## 2019-07-26 ENCOUNTER — HOSPITAL ENCOUNTER (OUTPATIENT)
Dept: RADIATION ONCOLOGY | Facility: HOSPITAL | Age: 79
Discharge: HOME | End: 2019-07-26

## 2019-07-26 PROCEDURE — 77412 RADIATION TX DELIVERY LVL 3: CPT | Performed by: RADIOLOGY

## 2019-07-29 ENCOUNTER — HOSPITAL ENCOUNTER (OUTPATIENT)
Dept: INFUSION THERAPY | Age: 79
Discharge: HOME | End: 2019-07-29
Payer: MEDICARE

## 2019-07-29 ENCOUNTER — HOSPITAL ENCOUNTER (OUTPATIENT)
Dept: RADIATION ONCOLOGY | Facility: HOSPITAL | Age: 79
Discharge: HOME | End: 2019-07-29

## 2019-07-29 PROCEDURE — 6360000002 HC RX W HCPCS: Performed by: INTERNAL MEDICINE

## 2019-07-29 PROCEDURE — 77412 RADIATION TX DELIVERY LVL 3: CPT | Performed by: RADIOLOGY

## 2019-07-29 PROCEDURE — 77417 THER RADIOLOGY PORT IMAGE(S): CPT | Performed by: RADIOLOGY

## 2019-07-29 PROCEDURE — 96523 IRRIG DRUG DELIVERY DEVICE: CPT

## 2019-07-29 PROCEDURE — 2580000003 HC RX 258: Performed by: INTERNAL MEDICINE

## 2019-07-29 RX ORDER — SODIUM CHLORIDE 0.9 % (FLUSH) 0.9 %
10 SYRINGE (ML) INJECTION ONCE
Status: DISCONTINUED | OUTPATIENT
Start: 2019-07-29 | End: 2019-07-31 | Stop reason: HOSPADM

## 2019-07-30 ENCOUNTER — HOSPITAL ENCOUNTER (OUTPATIENT)
Dept: RADIATION ONCOLOGY | Facility: HOSPITAL | Age: 79
Discharge: HOME | End: 2019-07-30

## 2019-07-30 PROCEDURE — 77412 RADIATION TX DELIVERY LVL 3: CPT | Performed by: RADIOLOGY

## 2019-07-31 ENCOUNTER — HOSPITAL ENCOUNTER (OUTPATIENT)
Dept: RADIATION ONCOLOGY | Facility: HOSPITAL | Age: 79
Discharge: HOME | End: 2019-07-31

## 2019-07-31 PROCEDURE — 77336 RADIATION PHYSICS CONSULT: CPT | Performed by: RADIOLOGY

## 2019-07-31 PROCEDURE — 77412 RADIATION TX DELIVERY LVL 3: CPT | Performed by: RADIOLOGY

## 2019-08-01 ENCOUNTER — HOSPITAL ENCOUNTER (OUTPATIENT)
Dept: RADIATION ONCOLOGY | Facility: HOSPITAL | Age: 79
Setting detail: RADIATION/ONCOLOGY SERIES
End: 2019-08-01

## 2019-08-01 ENCOUNTER — HOSPITAL ENCOUNTER (OUTPATIENT)
Dept: INFUSION THERAPY | Age: 79
Discharge: HOME | End: 2019-08-01
Payer: MEDICARE

## 2019-08-01 ENCOUNTER — HOSPITAL ENCOUNTER (OUTPATIENT)
Dept: RADIATION ONCOLOGY | Facility: HOSPITAL | Age: 79
Discharge: HOME | End: 2019-08-01

## 2019-08-01 PROCEDURE — 77412 RADIATION TX DELIVERY LVL 3: CPT | Performed by: RADIOLOGY

## 2019-08-01 PROCEDURE — 99211 OFF/OP EST MAY X REQ PHY/QHP: CPT

## 2019-08-01 PROCEDURE — 85025 COMPLETE CBC W/AUTO DIFF WBC: CPT

## 2019-08-02 ENCOUNTER — HOSPITAL ENCOUNTER (OUTPATIENT)
Dept: RADIATION ONCOLOGY | Facility: HOSPITAL | Age: 79
Discharge: HOME | End: 2019-08-02

## 2019-08-02 PROCEDURE — 77412 RADIATION TX DELIVERY LVL 3: CPT | Performed by: RADIOLOGY

## 2019-08-05 ENCOUNTER — HOSPITAL ENCOUNTER (OUTPATIENT)
Dept: RADIATION ONCOLOGY | Facility: HOSPITAL | Age: 79
Discharge: HOME | End: 2019-08-05

## 2019-08-05 PROCEDURE — 77417 THER RADIOLOGY PORT IMAGE(S): CPT | Performed by: RADIOLOGY

## 2019-08-05 PROCEDURE — 77412 RADIATION TX DELIVERY LVL 3: CPT | Performed by: RADIOLOGY

## 2019-08-06 ENCOUNTER — HOSPITAL ENCOUNTER (OUTPATIENT)
Dept: RADIATION ONCOLOGY | Facility: HOSPITAL | Age: 79
Discharge: HOME | End: 2019-08-06

## 2019-08-06 PROCEDURE — 77412 RADIATION TX DELIVERY LVL 3: CPT | Performed by: RADIOLOGY

## 2019-08-07 ENCOUNTER — HOSPITAL ENCOUNTER (OUTPATIENT)
Dept: RADIATION ONCOLOGY | Facility: HOSPITAL | Age: 79
Discharge: HOME | End: 2019-08-07

## 2019-08-07 PROCEDURE — 77336 RADIATION PHYSICS CONSULT: CPT | Performed by: RADIOLOGY

## 2019-08-07 PROCEDURE — 77412 RADIATION TX DELIVERY LVL 3: CPT | Performed by: RADIOLOGY

## 2019-08-08 ENCOUNTER — HOSPITAL ENCOUNTER (OUTPATIENT)
Dept: RADIATION ONCOLOGY | Facility: HOSPITAL | Age: 79
Discharge: HOME | End: 2019-08-08

## 2019-08-08 PROCEDURE — 77412 RADIATION TX DELIVERY LVL 3: CPT | Performed by: RADIOLOGY

## 2019-08-08 RX ORDER — DIGOXIN 125 MCG
125 TABLET ORAL
Qty: 30 TABLET | Refills: 11 | Status: SHIPPED | OUTPATIENT
Start: 2019-08-08

## 2019-08-09 ENCOUNTER — HOSPITAL ENCOUNTER (OUTPATIENT)
Dept: RADIATION ONCOLOGY | Facility: HOSPITAL | Age: 79
Discharge: HOME | End: 2019-08-09

## 2019-08-09 PROCEDURE — 77412 RADIATION TX DELIVERY LVL 3: CPT | Performed by: RADIOLOGY

## 2019-08-12 ENCOUNTER — HOSPITAL ENCOUNTER (OUTPATIENT)
Dept: RADIATION ONCOLOGY | Facility: HOSPITAL | Age: 79
Discharge: HOME | End: 2019-08-12

## 2019-08-12 PROCEDURE — 77412 RADIATION TX DELIVERY LVL 3: CPT | Performed by: RADIOLOGY

## 2019-08-12 PROCEDURE — 77417 THER RADIOLOGY PORT IMAGE(S): CPT | Performed by: RADIOLOGY

## 2019-08-13 ENCOUNTER — HOSPITAL ENCOUNTER (OUTPATIENT)
Dept: RADIATION ONCOLOGY | Facility: HOSPITAL | Age: 79
Discharge: HOME | End: 2019-08-13

## 2019-08-13 PROCEDURE — 77412 RADIATION TX DELIVERY LVL 3: CPT | Performed by: RADIOLOGY

## 2019-08-15 ENCOUNTER — OFFICE VISIT (OUTPATIENT)
Dept: CARDIOLOGY | Facility: CLINIC | Age: 79
End: 2019-08-15

## 2019-08-15 ENCOUNTER — HOSPITAL ENCOUNTER (OUTPATIENT)
Dept: RADIATION ONCOLOGY | Facility: HOSPITAL | Age: 79
Discharge: HOME | End: 2019-08-15

## 2019-08-15 VITALS
HEART RATE: 92 BPM | HEIGHT: 74 IN | SYSTOLIC BLOOD PRESSURE: 110 MMHG | WEIGHT: 154 LBS | DIASTOLIC BLOOD PRESSURE: 62 MMHG | BODY MASS INDEX: 19.76 KG/M2

## 2019-08-15 DIAGNOSIS — Z79.01 CURRENT USE OF LONG TERM ANTICOAGULATION: ICD-10-CM

## 2019-08-15 DIAGNOSIS — I50.22 CHRONIC SYSTOLIC HEART FAILURE (HCC): ICD-10-CM

## 2019-08-15 DIAGNOSIS — I48.19 PERSISTENT ATRIAL FIBRILLATION (HCC): Primary | ICD-10-CM

## 2019-08-15 DIAGNOSIS — C83.32 DIFFUSE LARGE B-CELL LYMPHOMA OF INTRATHORACIC LYMPH NODES (HCC): ICD-10-CM

## 2019-08-15 PROCEDURE — 93000 ELECTROCARDIOGRAM COMPLETE: CPT | Performed by: NURSE PRACTITIONER

## 2019-08-15 PROCEDURE — 77336 RADIATION PHYSICS CONSULT: CPT | Performed by: RADIOLOGY

## 2019-08-15 PROCEDURE — 99214 OFFICE O/P EST MOD 30 MIN: CPT | Performed by: NURSE PRACTITIONER

## 2019-08-15 PROCEDURE — 77412 RADIATION TX DELIVERY LVL 3: CPT | Performed by: RADIOLOGY

## 2019-08-15 RX ORDER — HYDROCODONE BITARTRATE AND ACETAMINOPHEN 7.5; 325 MG/1; MG/1
1 TABLET ORAL EVERY 6 HOURS PRN
COMMUNITY

## 2019-08-15 NOTE — PROGRESS NOTES
Subjective:     Encounter Date:08/15/2019      Patient ID: Aram Nunez is a 79 y.o. male with a history of chronic systolic/diastolic heart failure, PAF with recent cardioversion last month, long term anticoagulation with Eliquis, non-Hodgkins lymphoma, chronic anemia, and DM. He presents today for follow up after recent cardioversion and new complaints of chest and back pain.     Chief Complaint: Chest/back pain  History of Present Illness   AF: asymptomatic. Rates have been well controlled. Denies palpitations or shortness of breath.    Lymphoma: under radiation treatment at present. Minimal chest discomfort.     CHF: no swelling, bloating, weight gain, or shortness of breath. Denies orthopnea, PND.     The following portions of the patient's history were reviewed and updated as appropriate: allergies, current medications, past family history, past medical history, past social history and past surgical history.     No Known Allergies      Current Outpatient Medications:   •  apixaban (ELIQUIS) 5 MG tablet tablet, Take 1 tablet by mouth Every 12 (Twelve) Hours., Disp: 60 tablet, Rfl: 11  •  B Complex Vitamins (VITAMIN B COMPLEX) capsule capsule, Take 1 capsule by mouth Daily., Disp: , Rfl:   •  digoxin (LANOXIN) 125 MCG tablet, Take 1 tablet by mouth Daily., Disp: 30 tablet, Rfl: 11  •  finasteride (PROSCAR) 5 MG tablet, Take 5 mg by mouth Daily., Disp: , Rfl:   •  furosemide (LASIX) 20 MG tablet, Take 1 tablet by mouth Daily., Disp: 30 tablet, Rfl: 11  •  HYDROcodone-acetaminophen (NORCO) 7.5-325 MG per tablet, Take 1 tablet by mouth Every 6 (Six) Hours As Needed for Moderate Pain ., Disp: , Rfl:   •  Lidocaine-Prilocaine, Bulk, 2.5-2.5 % cream, Apply 1 application topically to the appropriate area as directed As Needed (APPLY TO AREA OF PORT 30 MIN- 1 HR PRIOR TO PORT ACCESS)., Disp: , Rfl:   •  loratadine (CLARITIN) 10 MG tablet, Take 10 mg by mouth Daily., Disp: , Rfl:   •  megestrol (MEGACE) 40  MG/ML suspension, Take 400 mg by mouth Daily., Disp: , Rfl:   •  melatonin 5 MG tablet tablet, Take 5 mg by mouth Every Night., Disp: , Rfl:   •  metFORMIN (GLUCOPHAGE) 1000 MG tablet, Take 1,000 mg by mouth 2 (Two) Times a Day With Meals., Disp: , Rfl:   •  metoprolol tartrate (LOPRESSOR) 25 MG tablet, Take 0.5 tablets by mouth Every 12 (Twelve) Hours., Disp: 60 tablet, Rfl: 2  •  pantoprazole (PROTONIX) 40 MG EC tablet, Take 40 mg by mouth 2 (Two) Times a Day., Disp: , Rfl:   •  potassium chloride (K-DUR,KLOR-CON) 20 MEQ CR tablet, Take 1 tablet by mouth Daily., Disp: 30 tablet, Rfl: 11  •  traZODone (DESYREL) 50 MG tablet, Take 50 mg by mouth Every Night., Disp: , Rfl: ]    Past Medical History:   Diagnosis Date   • Arthritis    • Cancer (CMS/HCC)     skin   • Diabetes mellitus (CMS/HCC)     borderline, no medication   • Disease of thyroid gland    • Dysrhythmia    • HL (hearing loss)    • Hyperlipidemia    • Hypertension    • IBS (irritable bowel syndrome)    • Lung mass 12/24/2018   • Neuropathy    • Non Hodgkin's lymphoma (CMS/HCC)      Family History   Problem Relation Age of Onset   • Heart disease Mother    • Cancer Father    • Heart disease Brother      Social History     Socioeconomic History   • Marital status:      Spouse name: Not on file   • Number of children: Not on file   • Years of education: Not on file   • Highest education level: Not on file   Tobacco Use   • Smoking status: Never Smoker   • Smokeless tobacco: Never Used   Substance and Sexual Activity   • Alcohol use: No   • Drug use: No   • Sexual activity: Defer     Past Surgical History:   Procedure Laterality Date   • CARDIAC CATHETERIZATION     • PARATHYROID GLAND SURGERY     • PROSTATE SURGERY      PROSTATECTOMY   • SKIN CANCER EXCISION       Review of Systems   Constitution: Positive for malaise/fatigue. Negative for chills, diaphoresis, fever and weakness.   Cardiovascular: Negative for chest pain, dyspnea on exertion, leg  "swelling, near-syncope, orthopnea, palpitations, paroxysmal nocturnal dyspnea and syncope.   Respiratory: Negative for cough, shortness of breath and wheezing.    Hematologic/Lymphatic: Negative for bleeding problem. Bruises/bleeds easily.   Skin: Negative for dry skin, flushing and itching.   Musculoskeletal: Positive for back pain.   Gastrointestinal: Negative for bloating, abdominal pain, nausea and vomiting.   Neurological: Negative for dizziness, focal weakness and light-headedness.   Psychiatric/Behavioral: Negative for altered mental status. The patient is not nervous/anxious.          ECG 12 Lead  Date/Time: 8/15/2019 2:56 PM  Performed by: Kateryna Draper APRN  Authorized by: Kateryna Draper APRN   Comparison: compared with previous ECG from 8/15/2019  Similar to previous ECG  Rhythm: atrial fibrillation  Ectopy: infrequent PVCs  Rate: normal  BPM: 92  Conduction: conduction normal  ST Segments: ST segments normal  T Waves: T waves normal  QRS axis: normal    Clinical impression: abnormal EKG          /62   Pulse 92   Ht 188 cm (74\")   Wt 69.9 kg (154 lb)   BMI 19.77 kg/m²        Objective:     Physical Exam   Constitutional: He is oriented to person, place, and time. He appears well-developed. He appears cachectic. He is cooperative. He appears ill. He appears distressed (in acute pain).   HENT:   Head: Normocephalic and atraumatic.   Mouth/Throat: Oropharynx is clear and moist.   Eyes: Conjunctivae are normal. No scleral icterus.   Neck: Normal range of motion. Neck supple.   Cardiovascular: Normal rate and normal heart sounds. An irregularly irregular rhythm present.   Pulmonary/Chest: Effort normal and breath sounds normal. No respiratory distress. He has no decreased breath sounds. He has no wheezes. He has no rhonchi. He has no rales.   Abdominal: Soft. Normal appearance. He exhibits no distension. There is no tenderness.   Musculoskeletal: He exhibits no edema.   Neurological: He is alert " "and oriented to person, place, and time.   Skin: Skin is warm, dry and intact. No rash noted. He is not diaphoretic. No erythema. No pallor.   Psychiatric: He has a normal mood and affect. His behavior is normal.       Lab Review:       Assessment:          Diagnosis Plan   1. Persistent atrial fibrillation (CMS/HCC)     2. Chronic systolic heart failure (CMS/HCC)     3. Current use of long term anticoagulation     4. Diffuse large B-cell lymphoma of intrathoracic lymph nodes (CMS/HCC)            Plan:        - AF, persistent: remains in AF.  Anticoagulated and rate controlled. No changes at this time. He is asymptomatic.     - CHF: previously treated for systolic and diastolic heart failure at time of diagnosis of AF. Since that time this has been stable. He remains on low dose lasix every day with K supplement. He remains rate controlled. He is asking to \"stop any heart medications he doesn't need.  He is inquiring about his lasix. I have told him it is reasonable to use his lasix and potassium as needed at home. He can start this any time. He should monitor for swelling and weight gain with daily weights. He verbalized understanding.    - anticoagulation: remain on Eliquis for stroke risk reduction. He is on 5 mg BID. He will be 80 years old in January and his weights vary.  Monitor closely in future for dose change.    - lymphoma: recurrence. Treatment with radiation at present. Following with [facility]      Follow up on cardiology in 3 months sooner if needed       "

## 2019-08-16 ENCOUNTER — HOSPITAL ENCOUNTER (OUTPATIENT)
Dept: RADIATION ONCOLOGY | Facility: HOSPITAL | Age: 79
Discharge: HOME | End: 2019-08-16

## 2019-08-16 PROCEDURE — 77412 RADIATION TX DELIVERY LVL 3: CPT | Performed by: RADIOLOGY

## 2019-08-19 ENCOUNTER — HOSPITAL ENCOUNTER (OUTPATIENT)
Dept: RADIATION ONCOLOGY | Facility: HOSPITAL | Age: 79
Discharge: HOME | End: 2019-08-19

## 2019-08-19 PROCEDURE — 77412 RADIATION TX DELIVERY LVL 3: CPT | Performed by: RADIOLOGY

## 2019-08-19 RX ORDER — MEGESTROL ACETATE 40 MG/ML
SUSPENSION ORAL
Qty: 240 ML | Refills: 1 | Status: SHIPPED | OUTPATIENT
Start: 2019-08-19 | End: 2019-10-14 | Stop reason: SDUPTHER

## 2019-08-20 ENCOUNTER — HOSPITAL ENCOUNTER (OUTPATIENT)
Dept: RADIATION ONCOLOGY | Facility: HOSPITAL | Age: 79
Discharge: HOME | End: 2019-08-20

## 2019-08-20 VITALS
SYSTOLIC BLOOD PRESSURE: 124 MMHG | BODY MASS INDEX: 19.38 KG/M2 | DIASTOLIC BLOOD PRESSURE: 72 MMHG | HEIGHT: 74 IN | HEART RATE: 46 BPM | WEIGHT: 151 LBS

## 2019-08-20 DIAGNOSIS — C83.39 DIFFUSE LARGE B-CELL LYMPHOMA, EXTRANODAL AND SOLID ORGAN SITES (HCC): ICD-10-CM

## 2019-08-20 DIAGNOSIS — M79.2 NEURALGIA AND NEURITIS: ICD-10-CM

## 2019-08-20 DIAGNOSIS — Z01.818 ENCOUNTER FOR OTHER PREPROCEDURAL EXAMINATION: ICD-10-CM

## 2019-08-20 PROCEDURE — 77412 RADIATION TX DELIVERY LVL 3: CPT | Performed by: RADIOLOGY

## 2019-08-20 PROCEDURE — 77417 THER RADIOLOGY PORT IMAGE(S): CPT | Performed by: RADIOLOGY

## 2019-08-20 RX ORDER — POTASSIUM CHLORIDE 20 MEQ/1
1 TABLET, EXTENDED RELEASE ORAL DAILY
Refills: 11 | COMMUNITY
Start: 2019-07-17

## 2019-08-20 RX ORDER — VITAMIN B COMPLEX
1 CAPSULE ORAL 2 TIMES DAILY
COMMUNITY

## 2019-08-20 RX ORDER — FUROSEMIDE 20 MG/1
20 TABLET ORAL EVERY MORNING
COMMUNITY
Start: 2019-06-11

## 2019-08-20 NOTE — PROGRESS NOTES
Andre Calderon   1940 8/21/2019     Chief Complaint   Patient presents with    Lymphoma        Interval history/history of present illness:  Diagnosis   Non-Hodgkin lymphoma, DLBC   Stage IVB   ABC phenotype   FISH rearrangement negative for BCL2, BCL6 and c-Myc   Bone marrow positive for clonal B-cell gene rearrangement (minimal bone marrow involvement by lymphoma)   IPS score 4   [id number]-mkinjnflmypul-[id number]? ? Lymphoma relapse(chest wall recurrence), July 2019  Treatment summary  01/18/2019-4/15/2019-R-CHOP chemotherapy   Revlimid 15 mg by mouth daily, days 1-10 starting with cycle #3 through cycle#5   Current receiving palliative RT right chest wall recurrence      Interval history:  Mr. 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 He is status post systemic chemotherapy with R CHOP and Revlimid, completed on 4/15/2019. Unfortunately, he had disease recurrence documented on 7/16/2019 in the left chest wall. He is currently receiving RT chest wall recurrence. Left chest wall pain has improved significantly. He was seen as a second opinion at [facility] and recommended to continue his radiation. He was also recommended [facility]. He presents here today to discuss further treatment options. He denies any B symptoms. He is still quite fatigued and tired and still has generalized weakness. He is accompanied by his wife today. Hematology history  Mr Andre Calderon was seen in initial oncology consultation on 1/11/2018 referred for a diagnosis of non-Hodgkin lymphoma. He initially presented on 12/24/2018 to the ER department at [facility].  12/24/2018-CT chest showed [facility] a left upper lobe with a pleural based 7.6 x 4.1 x 9.4 cm mass which may infiltrate the chest wall.  Enlarged lymph node at the left lateral aspect of the Sexual Activity    Alcohol use: No    Drug use: Not on file    Sexual activity: Not on file   Lifestyle    Physical activity:     Days per week: Not on file     Minutes per session: Not on file    Stress: Not on file   Relationships    Social connections:     Talks on phone: Not on file     Gets together: Not on file     Attends Orthodox service: Not on file     Active member of club or organization: Not on file     Attends meetings of clubs or organizations: Not on file     Relationship status: Not on file    Intimate partner violence:     Fear of current or ex partner: Not on file     Emotionally abused: Not on file     Physically abused: Not on file     Forced sexual activity: Not on file   Other Topics Concern    Not on file   Social History Narrative    Not on file        Family history:   Family History   Problem Relation Age of Onset    High Blood Pressure Mother     Heart Disease Mother     Other Father     Cancer Father         Current Outpatient Medications   Medication Sig Dispense Refill    B Complex CAPS Take 1 capsule by mouth 2 times daily      metFORMIN (GLUCOPHAGE) 500 MG tablet Take 500 mg by mouth 2 times daily (with meals)      HYDROcodone-acetaminophen (NORCO) 7.5-325 MG per tablet Take 1 tablet by mouth every 4 hours as needed for Pain.  pantoprazole (PROTONIX) 40 MG tablet Take 40 mg by mouth 2 times daily      furosemide (LASIX) 20 MG tablet Take 20 mg by mouth every morning      potassium chloride (KLOR-CON M) 20 MEQ extended release tablet Take 1 tablet by mouth daily  11    megestrol (MEGACE) 40 MG/ML suspension TAKE 10 MLS BY MOUTH DAILY 240 mL 1    polyethylene glycol (GLYCOLAX) powder Take 17 g by mouth daily      allopurinol (ZYLOPRIM) 300 MG tablet Take 300 mg by mouth daily      UNABLE TO FIND        No current facility-administered medications for this visit.          REVIEW OF SYSTEMS:    Constitutional: no fever, no night sweats, fatigue; generalized

## 2019-08-21 ENCOUNTER — HOSPITAL ENCOUNTER (OUTPATIENT)
Dept: RADIATION ONCOLOGY | Facility: HOSPITAL | Age: 79
Discharge: HOME | End: 2019-08-21

## 2019-08-21 ENCOUNTER — HOSPITAL ENCOUNTER (OUTPATIENT)
Dept: INFUSION THERAPY | Age: 79
Discharge: HOME | End: 2019-08-21
Payer: MEDICARE

## 2019-08-21 ENCOUNTER — OFFICE VISIT (OUTPATIENT)
Dept: HEMATOLOGY | Age: 79
End: 2019-08-21
Payer: MEDICARE

## 2019-08-21 VITALS
HEIGHT: 73 IN | HEART RATE: 56 BPM | BODY MASS INDEX: 20.54 KG/M2 | SYSTOLIC BLOOD PRESSURE: 118 MMHG | WEIGHT: 155 LBS | OXYGEN SATURATION: 99 % | DIASTOLIC BLOOD PRESSURE: 56 MMHG

## 2019-08-21 DIAGNOSIS — I50.22 CHRONIC SYSTOLIC HEART FAILURE (HCC): ICD-10-CM

## 2019-08-21 DIAGNOSIS — T50.905S TOXICITY, LATE EFFECT, DUE TO DRUG, MEDICINE, OR BIOLOGICAL SUBSTANCE: ICD-10-CM

## 2019-08-21 DIAGNOSIS — C83.39 DIFFUSE LARGE B-CELL LYMPHOMA, EXTRANODAL AND SOLID ORGAN SITES (HCC): Primary | ICD-10-CM

## 2019-08-21 DIAGNOSIS — C83.39 DIFFUSE LARGE B-CELL LYMPHOMA, EXTRANODAL AND SOLID ORGAN SITES (HCC): ICD-10-CM

## 2019-08-21 DIAGNOSIS — Z71.89 CARE PLAN DISCUSSED WITH PATIENT: ICD-10-CM

## 2019-08-21 PROCEDURE — 85025 COMPLETE CBC W/AUTO DIFF WBC: CPT

## 2019-08-21 PROCEDURE — 77412 RADIATION TX DELIVERY LVL 3: CPT | Performed by: RADIOLOGY

## 2019-08-21 PROCEDURE — 99213 OFFICE O/P EST LOW 20 MIN: CPT | Performed by: INTERNAL MEDICINE

## 2019-08-22 ENCOUNTER — HOSPITAL ENCOUNTER (OUTPATIENT)
Dept: RADIATION ONCOLOGY | Facility: HOSPITAL | Age: 79
Discharge: HOME | End: 2019-08-22

## 2019-08-22 PROCEDURE — 77336 RADIATION PHYSICS CONSULT: CPT | Performed by: RADIOLOGY

## 2019-08-22 PROCEDURE — 77412 RADIATION TX DELIVERY LVL 3: CPT | Performed by: RADIOLOGY

## 2019-08-23 ENCOUNTER — HOSPITAL ENCOUNTER (OUTPATIENT)
Dept: RADIATION ONCOLOGY | Facility: HOSPITAL | Age: 79
Discharge: HOME | End: 2019-08-23

## 2019-08-23 PROCEDURE — 77412 RADIATION TX DELIVERY LVL 3: CPT | Performed by: RADIOLOGY

## 2019-08-26 ENCOUNTER — HOSPITAL ENCOUNTER (OUTPATIENT)
Dept: RADIATION ONCOLOGY | Facility: HOSPITAL | Age: 79
Discharge: HOME | End: 2019-08-26

## 2019-08-26 PROCEDURE — 77412 RADIATION TX DELIVERY LVL 3: CPT | Performed by: RADIOLOGY

## 2019-09-04 NOTE — PROGRESS NOTES
Milana Cazares   1940 9/5/2019     Chief Complaint   Patient presents with    Lymphoma        Interval history/history of present illness:  Diagnosis   Non-Hodgkin lymphoma, DLBC   Stage IVB   ABC phenotype   FISH rearrangement negative for BCL2, BCL6 and c-Myc   Bone marrow positive for clonal B-cell gene rearrangement (minimal bone marrow involvement by lymphoma)   IPS score 4   Iqyqlbdcbgdzmk-etjbfxpcreqgc-upctaty? ? Lymphoma relapse(chest wall recurrence), July 2019  Treatment summary  01/18/2019-4/15/2019-R-CHOP chemotherapy   Revlimid 15 mg by mouth daily, days 1-10 starting with cycle #3 through cycle#5   Completion of RT right chest wall recurrence-August 2019      Interval history:  Mr. 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 He is status post systemic chemotherapy with R CHOP and Revlimid, completed on 4/15/2019. Unfortunately, he had disease recurrence documented on 7/16/2019 in the left chest wall. He is currently receiving RT for his chest wall recurrence. Left chest wall pain has improved significantly. He was seen as a second opinion at [facility] and recommended to continue his radiation and then start Imbruvica 2 to 3 weeks after completion of radiation. He presents here today to discuss further treatment options. He denies any B symptoms. He is still quite fatigued. He is accompanied by his wife and his daughter today. Of note the patient is taking Eliquis for a prior diagnosis of A. fib. Hematology history  Mr Milana Cazares was seen in initial oncology consultation on 1/11/2018 referred for a diagnosis of non-Hodgkin lymphoma.  He initially presented on 12/24/2018 to the ER department at [facility].  12/24/2018-CT chest showed at [facility] a left upper lobe with a pleural based 7.6 x 4.1 x 9.4 cm mass which may hospitalized at [facility] with heart failure. 2019-PET scan showed faint uptake in the left chest wall in the region of the previously seen hypermetabolic mass. No adjacent mass is noted. No hypermetabolic adenopathy. 2019-CT chest, abdomen, pelvis showed a 8.3 x 2.3 cm soft tissue mass involving left second, third, fourth and fifth ribs in the left upper pleura chest wall region consistent with lymphoma recurrence. 2019-referred for palliative radiation left chest wall mass.        ECO      Past medical history:  Past Medical History:   Diagnosis Date    Cancer [facility])     large b cell lymphoma    Chest pain, unspecified     Diabetes mellitus ([facility] [address]     Diffuse large b-cell lymphoma, extranodal and solid organ sites [facility])     Encounter for other preprocedural examination     Hyperlipidemia     Hypertension     IBS (irritable bowel syndrome)     Neuralgia and neuritis     Non-Hodgkin lymphoma ([facility] [address]     Osteoarthritis     Peripheral vascular disease (HCC)     Thyroid disease     had parathyroid gland removed        Past surgical history:  Past Surgical History:   Procedure Laterality Date    CARDIAC SURGERY      heart cath 2    COLONOSCOPY      COLONOSCOPY      INSERTION / REMOVAL / REPLACEMENT VENOUS ACCESS CATHETER N/A 2019    SINGLE LUMEN PORT PLACEMENT performed by Bonny Land MD at [place] [address]      PARATHYROIDECTOMY      PROSTATECTOMY          Social history:  Social History     Socioeconomic History    Marital status:      Spouse name: Not on file    Number of children: Not on file    Years of education: Not on file    Highest education level: Not on file   Occupational History    Not on file   Social Needs    Financial resource strain: Not on file    Food insecurity:     Worry: Not on file     Inability: Not on file    Transportation needs:     Medical: Not on file     Non-medical: Not on file   Tobacco Use

## 2019-09-05 ENCOUNTER — OFFICE VISIT (OUTPATIENT)
Dept: HEMATOLOGY | Age: 79
End: 2019-09-05
Payer: MEDICARE

## 2019-09-05 ENCOUNTER — HOSPITAL ENCOUNTER (OUTPATIENT)
Dept: GENERAL RADIOLOGY | Age: 79
Discharge: HOME | End: 2019-09-05
Payer: MEDICARE

## 2019-09-05 ENCOUNTER — HOSPITAL ENCOUNTER (OUTPATIENT)
Dept: INFUSION THERAPY | Age: 79
Discharge: HOME | End: 2019-09-05
Payer: MEDICARE

## 2019-09-05 VITALS
SYSTOLIC BLOOD PRESSURE: 110 MMHG | DIASTOLIC BLOOD PRESSURE: 60 MMHG | BODY MASS INDEX: 20.9 KG/M2 | WEIGHT: 157.7 LBS | OXYGEN SATURATION: 96 % | HEART RATE: 60 BPM | HEIGHT: 73 IN

## 2019-09-05 DIAGNOSIS — C83.39 DIFFUSE LARGE B-CELL LYMPHOMA, EXTRANODAL AND SOLID ORGAN SITES (HCC): Primary | ICD-10-CM

## 2019-09-05 DIAGNOSIS — C83.39 DIFFUSE LARGE B-CELL LYMPHOMA, EXTRANODAL AND SOLID ORGAN SITES (HCC): ICD-10-CM

## 2019-09-05 DIAGNOSIS — Z51.11 CHEMOTHERAPY MANAGEMENT, ENCOUNTER FOR: ICD-10-CM

## 2019-09-05 DIAGNOSIS — Z71.89 CARE PLAN DISCUSSED WITH PATIENT: ICD-10-CM

## 2019-09-05 PROCEDURE — 71046 X-RAY EXAM CHEST 2 VIEWS: CPT

## 2019-09-05 PROCEDURE — 85025 COMPLETE CBC W/AUTO DIFF WBC: CPT

## 2019-09-05 PROCEDURE — 99214 OFFICE O/P EST MOD 30 MIN: CPT | Performed by: INTERNAL MEDICINE

## 2019-09-24 LAB
CYTO UR: NORMAL
LAB AP CASE REPORT: NORMAL
LAB AP FLOW CYTOMETRY REPORT,ADDENDUM: NORMAL
LAB AP FLOW CYTOMETRY SUMMARY: NORMAL
LAB AP SYNOPTIC CHECKLIST: NORMAL
Lab: NORMAL
PATH REPORT.FINAL DX SPEC: NORMAL
PATH REPORT.GROSS SPEC: NORMAL

## 2019-09-26 ENCOUNTER — HOSPITAL ENCOUNTER (OUTPATIENT)
Dept: CT IMAGING | Facility: HOSPITAL | Age: 79
Discharge: HOME | End: 2019-09-26
Admitting: INTERNAL MEDICINE

## 2019-09-26 LAB — CREAT BLDA-MCNC: 0.8 MG/DL (ref 0.6–1.3)

## 2019-09-26 PROCEDURE — 0 IOHEXOL 300 MG/ML SOLUTION: Performed by: INTERNAL MEDICINE

## 2019-09-26 PROCEDURE — 74177 CT ABD & PELVIS W/CONTRAST: CPT

## 2019-09-26 PROCEDURE — 25010000002 IOPAMIDOL 61 % SOLUTION: Performed by: INTERNAL MEDICINE

## 2019-09-26 PROCEDURE — 82565 ASSAY OF CREATININE: CPT

## 2019-09-26 PROCEDURE — 71260 CT THORAX DX C+: CPT

## 2019-09-26 RX ADMIN — IOPAMIDOL 100 ML: 612 INJECTION, SOLUTION INTRAVENOUS at 09:21

## 2019-09-26 RX ADMIN — IOHEXOL 50 ML: 300 INJECTION, SOLUTION INTRAVENOUS at 09:21

## 2019-09-27 NOTE — PROGRESS NOTES
soft non-tender, active bowel sounds, no hepatosplenomegaly. No palpable masses. EXTREMITIES: warm, Full ROM of all fours extremities. No focal weakness. SKIN: warm, dry with no rashes or lesions  LYMPH: No cervical, clavicular, axillary, or inguinal lymphadenopathy  NEUROLOGIC: follows commands, non focal.   PSYCH: mood and affect appropriate. Alert and oriented to time and place and person. Relevant Lab findings:    CBC reviewed by me. WBC 6.1 hemoglobin 11.7, platelet [phone]    Relevant Imaging studies:  1. New 3.1 cm enhancing solid mass in the left tracheoesophageal groove  at the level of the thyroid gland, concerning for disease progression. 2. Previously identified recurrent left chest wall mass is essentially  resolved, with only a minimal residual pleural thickening may reflect a  reactive change and/or scarring. 3. There is a 1.2 cm soft tissue nodule in the left AP window which is  stable. 4. New minimal groundglass nodularity in the left upper lobe which may  reflect an early radiation-induced change if recent chest wall  Radiation. 5. There is a new 2.4 cm soft tissue implant identified in the fat just  above the left hemidiaphragm, concerning for lymphoma progression. 6. Additional 1.4 cm soft tissue nodule in the retroperitoneal space  adjacent to the aorta which is enlarged from the prior exam, also  concerning for lymphoma. ASSESSMENT:    Orders Placed This Encounter   Procedures    CBC Auto Differential     5 part     Standing Status:   Future     Number of Occurrences:   1     Standing Expiration Date:   9/30/2020      Herman Andre was seen today for lymphoma. Diagnoses and all orders for this visit:    Diffuse large b-cell lymphoma, extranodal and solid organ sites [facility])  -     CBC Auto Differential; Future    Care plan discussed with patient       ABC phenotype Diffuse large B-cell lymphoma, stage IVb relapse (left chest wall) July 2019. CT C/A/P 09/26/2019:  1.  New 3.1

## 2019-09-30 ENCOUNTER — HOSPITAL ENCOUNTER (OUTPATIENT)
Dept: INFUSION THERAPY | Age: 79
Discharge: HOME | End: 2019-09-30
Payer: MEDICARE

## 2019-09-30 ENCOUNTER — OFFICE VISIT (OUTPATIENT)
Dept: HEMATOLOGY | Age: 79
End: 2019-09-30
Payer: MEDICARE

## 2019-09-30 VITALS
HEART RATE: 69 BPM | BODY MASS INDEX: 20.94 KG/M2 | HEIGHT: 73 IN | OXYGEN SATURATION: 92 % | WEIGHT: 158 LBS | SYSTOLIC BLOOD PRESSURE: 110 MMHG | DIASTOLIC BLOOD PRESSURE: 60 MMHG

## 2019-09-30 DIAGNOSIS — C83.39 DIFFUSE LARGE B-CELL LYMPHOMA, EXTRANODAL AND SOLID ORGAN SITES (HCC): Primary | ICD-10-CM

## 2019-09-30 DIAGNOSIS — Z71.89 CARE PLAN DISCUSSED WITH PATIENT: ICD-10-CM

## 2019-09-30 DIAGNOSIS — C83.39 DIFFUSE LARGE B-CELL LYMPHOMA, EXTRANODAL AND SOLID ORGAN SITES (HCC): ICD-10-CM

## 2019-09-30 PROCEDURE — 4040F PNEUMOC VAC/ADMIN/RCVD: CPT | Performed by: INTERNAL MEDICINE

## 2019-09-30 PROCEDURE — 85025 COMPLETE CBC W/AUTO DIFF WBC: CPT

## 2019-09-30 PROCEDURE — 99214 OFFICE O/P EST MOD 30 MIN: CPT | Performed by: INTERNAL MEDICINE

## 2019-09-30 PROCEDURE — G8420 CALC BMI NORM PARAMETERS: HCPCS | Performed by: INTERNAL MEDICINE

## 2019-09-30 PROCEDURE — 1123F ACP DISCUSS/DSCN MKR DOCD: CPT | Performed by: INTERNAL MEDICINE

## 2019-09-30 PROCEDURE — G8427 DOCREV CUR MEDS BY ELIG CLIN: HCPCS | Performed by: INTERNAL MEDICINE

## 2019-09-30 PROCEDURE — 1036F TOBACCO NON-USER: CPT | Performed by: INTERNAL MEDICINE

## 2019-09-30 RX ORDER — APIXABAN 5 MG/1
TABLET, FILM COATED ORAL
Refills: 11 | COMMUNITY
Start: 2019-09-16

## 2019-09-30 RX ORDER — CHOLECALCIFEROL (VITAMIN D3) 125 MCG
5 CAPSULE ORAL
COMMUNITY

## 2019-09-30 RX ORDER — DIGOXIN 125 MCG
TABLET ORAL
Refills: 11 | COMMUNITY
Start: 2019-09-16

## 2019-09-30 RX ORDER — FINASTERIDE 5 MG/1
TABLET, FILM COATED ORAL
COMMUNITY
Start: 2019-07-25

## 2019-09-30 RX ORDER — TRAZODONE HYDROCHLORIDE 50 MG/1
50 TABLET ORAL
COMMUNITY

## 2019-09-30 RX ORDER — LORATADINE 10 MG/1
10 TABLET ORAL
COMMUNITY

## 2019-10-01 ENCOUNTER — TELEPHONE (OUTPATIENT)
Dept: RADIATION ONCOLOGY | Facility: HOSPITAL | Age: 79
End: 2019-10-01

## 2019-10-01 NOTE — TELEPHONE ENCOUNTER
----- Message from Patsy Ojeda sent at 9/27/2019  4:05 PM CDT -----  Regarding: CT CAP  Mr. Nunez's daughter called asking if you would look at his CT chest, abdomen and pelvis done here on 9/26/19.  Dr. Walton ordered it but they wanted your opinion as well.  He has a 6 week follow-up visit with you on 10/10/19.  The daughter's name is Blossom Alvarez, cell# [phone]    Thank you.      I spoke with the patient's daughter Ms. Blossom Alvarez this evening about his recent findings on CT scan of the chest abdomen pelvis.  Although these 3 new areas could conceivably be treated with radiation I do not believe it would offer any significant benefit as he would likely have further systemic failure and he is asymptomatic at this time.    In addition, the patient is seen Dr. Walton and they do anticipate proceeding with a new regimen of systemic therapy.  I completely agree with this approach and previously told the patient that I believe strongly believe he needs and will benefit from systemic therapy rather than localized radiotherapy.    He did receive a good response to the large chest wall mass that we recently treated, but this will not prohibit him from developing new spots of tumor, which systemic therapy will hopefully attain this goal.

## 2019-10-07 ENCOUNTER — HOSPITAL ENCOUNTER (OUTPATIENT)
Dept: INFUSION THERAPY | Age: 79
Discharge: HOME | End: 2019-10-07
Payer: MEDICARE

## 2019-10-07 ENCOUNTER — OFFICE VISIT (OUTPATIENT)
Dept: HEMATOLOGY | Age: 79
End: 2019-10-07
Payer: MEDICARE

## 2019-10-07 VITALS
RESPIRATION RATE: 20 BRPM | HEIGHT: 73 IN | HEART RATE: 72 BPM | DIASTOLIC BLOOD PRESSURE: 78 MMHG | TEMPERATURE: 96.5 F | SYSTOLIC BLOOD PRESSURE: 152 MMHG | OXYGEN SATURATION: 98 % | BODY MASS INDEX: 20.85 KG/M2

## 2019-10-07 DIAGNOSIS — C83.39 DIFFUSE LARGE B-CELL LYMPHOMA, EXTRANODAL AND SOLID ORGAN SITES (HCC): ICD-10-CM

## 2019-10-07 DIAGNOSIS — T45.1X5A ADVERSE EFFECT OF CHEMOTHERAPY, INITIAL ENCOUNTER: ICD-10-CM

## 2019-10-07 DIAGNOSIS — C83.39 DIFFUSE LARGE B-CELL LYMPHOMA, EXTRANODAL AND SOLID ORGAN SITES (HCC): Primary | ICD-10-CM

## 2019-10-07 DIAGNOSIS — G89.3 CANCER-RELATED PAIN: ICD-10-CM

## 2019-10-07 DIAGNOSIS — C85.90 NON-HODGKIN'S LYMPHOMA, UNSPECIFIED BODY REGION, UNSPECIFIED NON-HODGKIN LYMPHOMA TYPE (HCC): Primary | ICD-10-CM

## 2019-10-07 DIAGNOSIS — Z51.11 CHEMOTHERAPY MANAGEMENT, ENCOUNTER FOR: ICD-10-CM

## 2019-10-07 PROCEDURE — 4040F PNEUMOC VAC/ADMIN/RCVD: CPT | Performed by: INTERNAL MEDICINE

## 2019-10-07 PROCEDURE — 83615 LACTATE (LD) (LDH) ENZYME: CPT

## 2019-10-07 PROCEDURE — 6370000000 HC RX 637 (ALT 250 FOR IP): Performed by: INTERNAL MEDICINE

## 2019-10-07 PROCEDURE — 96413 CHEMO IV INFUSION 1 HR: CPT

## 2019-10-07 PROCEDURE — 2580000003 HC RX 258: Performed by: INTERNAL MEDICINE

## 2019-10-07 PROCEDURE — G8484 FLU IMMUNIZE NO ADMIN: HCPCS | Performed by: INTERNAL MEDICINE

## 2019-10-07 PROCEDURE — G8420 CALC BMI NORM PARAMETERS: HCPCS | Performed by: INTERNAL MEDICINE

## 2019-10-07 PROCEDURE — 99214 OFFICE O/P EST MOD 30 MIN: CPT | Performed by: INTERNAL MEDICINE

## 2019-10-07 PROCEDURE — 6360000002 HC RX W HCPCS: Performed by: INTERNAL MEDICINE

## 2019-10-07 PROCEDURE — 85025 COMPLETE CBC W/AUTO DIFF WBC: CPT

## 2019-10-07 PROCEDURE — 80053 COMPREHEN METABOLIC PANEL: CPT

## 2019-10-07 PROCEDURE — 96375 TX/PRO/DX INJ NEW DRUG ADDON: CPT

## 2019-10-07 PROCEDURE — 1036F TOBACCO NON-USER: CPT | Performed by: INTERNAL MEDICINE

## 2019-10-07 PROCEDURE — G8427 DOCREV CUR MEDS BY ELIG CLIN: HCPCS | Performed by: INTERNAL MEDICINE

## 2019-10-07 PROCEDURE — 96415 CHEMO IV INFUSION ADDL HR: CPT

## 2019-10-07 PROCEDURE — 1123F ACP DISCUSS/DSCN MKR DOCD: CPT | Performed by: INTERNAL MEDICINE

## 2019-10-07 RX ORDER — EPINEPHRINE 1 MG/ML
0.3 INJECTION, SOLUTION, CONCENTRATE INTRAVENOUS PRN
Status: DISCONTINUED | OUTPATIENT
Start: 2019-10-07 | End: 2019-10-09 | Stop reason: HOSPADM

## 2019-10-07 RX ORDER — HYDROCODONE BITARTRATE AND ACETAMINOPHEN 7.5; 325 MG/1; MG/1
1 TABLET ORAL EVERY 6 HOURS PRN
Qty: 80 TABLET | Refills: 0 | Status: SHIPPED | OUTPATIENT
Start: 2019-10-07 | End: 2019-11-06

## 2019-10-07 RX ORDER — SODIUM CHLORIDE 0.9 % (FLUSH) 0.9 %
10 SYRINGE (ML) INJECTION PRN
Status: DISCONTINUED | OUTPATIENT
Start: 2019-10-07 | End: 2019-10-08 | Stop reason: HOSPADM

## 2019-10-07 RX ORDER — ACETAMINOPHEN 500 MG
1000 TABLET ORAL ONCE
Status: DISCONTINUED | OUTPATIENT
Start: 2019-10-07 | End: 2019-10-09 | Stop reason: HOSPADM

## 2019-10-07 RX ORDER — SODIUM CHLORIDE 0.9 % (FLUSH) 0.9 %
5 SYRINGE (ML) INJECTION PRN
Status: DISCONTINUED | OUTPATIENT
Start: 2019-10-07 | End: 2019-10-08 | Stop reason: HOSPADM

## 2019-10-07 RX ORDER — DIPHENHYDRAMINE HYDROCHLORIDE 50 MG/ML
50 INJECTION INTRAMUSCULAR; INTRAVENOUS PRN
Status: DISCONTINUED | OUTPATIENT
Start: 2019-10-07 | End: 2019-10-09 | Stop reason: HOSPADM

## 2019-10-07 RX ORDER — DIPHENHYDRAMINE HYDROCHLORIDE 50 MG/ML
25 INJECTION INTRAMUSCULAR; INTRAVENOUS ONCE
Status: DISCONTINUED | OUTPATIENT
Start: 2019-10-07 | End: 2019-10-09 | Stop reason: HOSPADM

## 2019-10-07 RX ORDER — DEXAMETHASONE SODIUM PHOSPHATE 10 MG/ML
10 INJECTION, SOLUTION INTRAMUSCULAR; INTRAVENOUS ONCE
Status: DISCONTINUED | OUTPATIENT
Start: 2019-10-07 | End: 2019-10-09 | Stop reason: HOSPADM

## 2019-10-07 RX ORDER — SODIUM CHLORIDE 9 MG/ML
20 INJECTION, SOLUTION INTRAVENOUS ONCE
Status: DISCONTINUED | OUTPATIENT
Start: 2019-10-07 | End: 2019-10-09 | Stop reason: HOSPADM

## 2019-10-07 RX ORDER — METHYLPREDNISOLONE SODIUM SUCCINATE 125 MG/2ML
125 INJECTION, POWDER, LYOPHILIZED, FOR SOLUTION INTRAMUSCULAR; INTRAVENOUS PRN
Status: DISCONTINUED | OUTPATIENT
Start: 2019-10-07 | End: 2019-10-09 | Stop reason: HOSPADM

## 2019-10-07 RX ORDER — PALONOSETRON 0.05 MG/ML
0.25 INJECTION, SOLUTION INTRAVENOUS ONCE
Status: DISCONTINUED | OUTPATIENT
Start: 2019-10-07 | End: 2019-10-09 | Stop reason: HOSPADM

## 2019-10-08 ENCOUNTER — HOSPITAL ENCOUNTER (OUTPATIENT)
Dept: INFUSION THERAPY | Age: 79
Discharge: HOME | End: 2019-10-08
Payer: MEDICARE

## 2019-10-08 DIAGNOSIS — C85.90 NON-HODGKIN'S LYMPHOMA, UNSPECIFIED BODY REGION, UNSPECIFIED NON-HODGKIN LYMPHOMA TYPE (HCC): ICD-10-CM

## 2019-10-08 DIAGNOSIS — D70.1 CHEMOTHERAPY-INDUCED NEUTROPENIA (HCC): Primary | ICD-10-CM

## 2019-10-08 DIAGNOSIS — C83.39 DIFFUSE LARGE B-CELL LYMPHOMA, EXTRANODAL AND SOLID ORGAN SITES (HCC): ICD-10-CM

## 2019-10-08 DIAGNOSIS — T45.1X5A CHEMOTHERAPY-INDUCED NEUTROPENIA (HCC): Primary | ICD-10-CM

## 2019-10-08 PROCEDURE — 2580000003 HC RX 258: Performed by: INTERNAL MEDICINE

## 2019-10-08 PROCEDURE — 6370000000 HC RX 637 (ALT 250 FOR IP): Performed by: INTERNAL MEDICINE

## 2019-10-08 PROCEDURE — 96367 TX/PROPH/DG ADDL SEQ IV INF: CPT

## 2019-10-08 PROCEDURE — 96368 THER/DIAG CONCURRENT INF: CPT

## 2019-10-08 PROCEDURE — 96417 CHEMO IV INFUS EACH ADDL SEQ: CPT

## 2019-10-08 PROCEDURE — 96413 CHEMO IV INFUSION 1 HR: CPT

## 2019-10-08 PROCEDURE — 6360000002 HC RX W HCPCS: Performed by: INTERNAL MEDICINE

## 2019-10-08 PROCEDURE — 2500000003 HC RX 250 WO HCPCS: Performed by: INTERNAL MEDICINE

## 2019-10-08 PROCEDURE — 96415 CHEMO IV INFUSION ADDL HR: CPT

## 2019-10-08 RX ORDER — DIPHENHYDRAMINE HYDROCHLORIDE 50 MG/ML
50 INJECTION INTRAMUSCULAR; INTRAVENOUS PRN
Status: DISCONTINUED | OUTPATIENT
Start: 2019-10-08 | End: 2019-10-10 | Stop reason: HOSPADM

## 2019-10-08 RX ORDER — SODIUM CHLORIDE 9 MG/ML
20 INJECTION, SOLUTION INTRAVENOUS ONCE
Status: DISCONTINUED | OUTPATIENT
Start: 2019-10-08 | End: 2019-10-10 | Stop reason: HOSPADM

## 2019-10-08 RX ORDER — SODIUM CHLORIDE 0.9 % (FLUSH) 0.9 %
10 SYRINGE (ML) INJECTION PRN
Status: DISCONTINUED | OUTPATIENT
Start: 2019-10-08 | End: 2019-10-09 | Stop reason: HOSPADM

## 2019-10-08 RX ORDER — METHYLPREDNISOLONE SODIUM SUCCINATE 125 MG/2ML
125 INJECTION, POWDER, LYOPHILIZED, FOR SOLUTION INTRAMUSCULAR; INTRAVENOUS PRN
Status: DISCONTINUED | OUTPATIENT
Start: 2019-10-08 | End: 2019-10-10 | Stop reason: HOSPADM

## 2019-10-08 RX ORDER — HEPARIN SODIUM (PORCINE) LOCK FLUSH IV SOLN 100 UNIT/ML 100 UNIT/ML
500 SOLUTION INTRAVENOUS PRN
Status: DISCONTINUED | OUTPATIENT
Start: 2019-10-08 | End: 2019-10-09 | Stop reason: HOSPADM

## 2019-10-08 RX ORDER — EPINEPHRINE 1 MG/ML
0.3 INJECTION, SOLUTION, CONCENTRATE INTRAVENOUS PRN
Status: DISCONTINUED | OUTPATIENT
Start: 2019-10-08 | End: 2019-10-10 | Stop reason: HOSPADM

## 2019-10-08 RX ORDER — ACETAMINOPHEN 500 MG
1000 TABLET ORAL ONCE
Status: DISCONTINUED | OUTPATIENT
Start: 2019-10-08 | End: 2019-10-10 | Stop reason: HOSPADM

## 2019-10-08 RX ORDER — DIPHENHYDRAMINE HYDROCHLORIDE 50 MG/ML
25 INJECTION INTRAMUSCULAR; INTRAVENOUS ONCE
Status: DISCONTINUED | OUTPATIENT
Start: 2019-10-08 | End: 2019-10-10 | Stop reason: HOSPADM

## 2019-10-08 RX ORDER — SODIUM CHLORIDE 0.9 % (FLUSH) 0.9 %
5 SYRINGE (ML) INJECTION PRN
Status: DISCONTINUED | OUTPATIENT
Start: 2019-10-08 | End: 2019-10-09 | Stop reason: HOSPADM

## 2019-10-09 ENCOUNTER — HOSPITAL ENCOUNTER (OUTPATIENT)
Dept: INFUSION THERAPY | Age: 79
Discharge: HOME | End: 2019-10-09
Payer: MEDICARE

## 2019-10-09 ENCOUNTER — CLINICAL DOCUMENTATION (OUTPATIENT)
Dept: HEMATOLOGY | Age: 79
End: 2019-10-09

## 2019-10-09 DIAGNOSIS — C83.39 DIFFUSE LARGE B-CELL LYMPHOMA, EXTRANODAL AND SOLID ORGAN SITES (HCC): Primary | ICD-10-CM

## 2019-10-09 DIAGNOSIS — T45.1X5A CHEMOTHERAPY-INDUCED NEUTROPENIA (HCC): Primary | ICD-10-CM

## 2019-10-09 DIAGNOSIS — C85.90 NON-HODGKIN'S LYMPHOMA, UNSPECIFIED BODY REGION, UNSPECIFIED NON-HODGKIN LYMPHOMA TYPE (HCC): ICD-10-CM

## 2019-10-09 DIAGNOSIS — D70.1 CHEMOTHERAPY-INDUCED NEUTROPENIA (HCC): Primary | ICD-10-CM

## 2019-10-09 DIAGNOSIS — C83.39 DIFFUSE LARGE B-CELL LYMPHOMA, EXTRANODAL AND SOLID ORGAN SITES (HCC): ICD-10-CM

## 2019-10-09 PROBLEM — Z92.3 HISTORY OF RADIATION THERAPY: Status: ACTIVE | Noted: 2019-10-09

## 2019-10-09 PROCEDURE — 80053 COMPREHEN METABOLIC PANEL: CPT

## 2019-10-09 PROCEDURE — 2580000003 HC RX 258: Performed by: INTERNAL MEDICINE

## 2019-10-09 PROCEDURE — 36415 COLL VENOUS BLD VENIPUNCTURE: CPT

## 2019-10-09 PROCEDURE — 96413 CHEMO IV INFUSION 1 HR: CPT

## 2019-10-09 PROCEDURE — 6360000002 HC RX W HCPCS: Performed by: INTERNAL MEDICINE

## 2019-10-09 PROCEDURE — 85025 COMPLETE CBC W/AUTO DIFF WBC: CPT

## 2019-10-09 PROCEDURE — 96372 THER/PROPH/DIAG INJ SC/IM: CPT

## 2019-10-09 PROCEDURE — 96367 TX/PROPH/DG ADDL SEQ IV INF: CPT

## 2019-10-09 RX ORDER — METHYLPREDNISOLONE SODIUM SUCCINATE 125 MG/2ML
125 INJECTION, POWDER, LYOPHILIZED, FOR SOLUTION INTRAMUSCULAR; INTRAVENOUS PRN
Status: DISCONTINUED | OUTPATIENT
Start: 2019-10-09 | End: 2019-10-11 | Stop reason: HOSPADM

## 2019-10-09 RX ORDER — DIPHENHYDRAMINE HYDROCHLORIDE 50 MG/ML
50 INJECTION INTRAMUSCULAR; INTRAVENOUS PRN
Status: DISCONTINUED | OUTPATIENT
Start: 2019-10-09 | End: 2019-10-11 | Stop reason: HOSPADM

## 2019-10-09 RX ORDER — SODIUM CHLORIDE 0.9 % (FLUSH) 0.9 %
5 SYRINGE (ML) INJECTION PRN
Status: DISCONTINUED | OUTPATIENT
Start: 2019-10-09 | End: 2019-10-10 | Stop reason: HOSPADM

## 2019-10-09 RX ORDER — SODIUM CHLORIDE 0.9 % (FLUSH) 0.9 %
10 SYRINGE (ML) INJECTION PRN
Status: DISCONTINUED | OUTPATIENT
Start: 2019-10-09 | End: 2019-10-10 | Stop reason: HOSPADM

## 2019-10-09 RX ORDER — ALLOPURINOL 300 MG/1
300 TABLET ORAL DAILY
Qty: 30 TABLET | Refills: 0 | Status: SHIPPED | OUTPATIENT
Start: 2019-10-09 | End: 2019-11-18

## 2019-10-09 RX ORDER — EPINEPHRINE 1 MG/ML
0.3 INJECTION, SOLUTION, CONCENTRATE INTRAVENOUS PRN
Status: DISCONTINUED | OUTPATIENT
Start: 2019-10-09 | End: 2019-10-11 | Stop reason: HOSPADM

## 2019-10-10 ENCOUNTER — HOSPITAL ENCOUNTER (OUTPATIENT)
Dept: RADIATION ONCOLOGY | Facility: HOSPITAL | Age: 79
Setting detail: RADIATION/ONCOLOGY SERIES
End: 2019-10-10

## 2019-10-14 RX ORDER — MEGESTROL ACETATE 40 MG/ML
SUSPENSION ORAL
Qty: 240 ML | Refills: 1 | Status: SHIPPED | OUTPATIENT
Start: 2019-10-14 | End: 2019-12-26

## 2019-10-25 RX ORDER — SODIUM CHLORIDE 0.9 % (FLUSH) 0.9 %
10 SYRINGE (ML) INJECTION PRN
Status: CANCELLED | OUTPATIENT
Start: 2019-10-28

## 2019-10-25 RX ORDER — SODIUM CHLORIDE 0.9 % (FLUSH) 0.9 %
10 SYRINGE (ML) INJECTION PRN
Status: CANCELLED | OUTPATIENT
Start: 2019-10-29

## 2019-10-25 RX ORDER — EPINEPHRINE 1 MG/ML
0.3 INJECTION, SOLUTION, CONCENTRATE INTRAVENOUS PRN
Status: CANCELLED | OUTPATIENT
Start: 2019-10-28

## 2019-10-25 RX ORDER — DIPHENHYDRAMINE HYDROCHLORIDE 50 MG/ML
50 INJECTION INTRAMUSCULAR; INTRAVENOUS PRN
Status: CANCELLED | OUTPATIENT
Start: 2019-10-28

## 2019-10-25 RX ORDER — HEPARIN SODIUM (PORCINE) LOCK FLUSH IV SOLN 100 UNIT/ML 100 UNIT/ML
500 SOLUTION INTRAVENOUS PRN
Status: CANCELLED | OUTPATIENT
Start: 2019-10-28

## 2019-10-25 RX ORDER — SODIUM CHLORIDE 9 MG/ML
20 INJECTION, SOLUTION INTRAVENOUS ONCE
Status: CANCELLED | OUTPATIENT
Start: 2019-10-28

## 2019-10-25 RX ORDER — SODIUM CHLORIDE 9 MG/ML
20 INJECTION, SOLUTION INTRAVENOUS ONCE
Status: CANCELLED | OUTPATIENT
Start: 2019-10-29

## 2019-10-25 RX ORDER — EPINEPHRINE 1 MG/ML
0.3 INJECTION, SOLUTION, CONCENTRATE INTRAVENOUS PRN
Status: CANCELLED | OUTPATIENT
Start: 2019-10-29

## 2019-10-25 RX ORDER — SODIUM CHLORIDE 0.9 % (FLUSH) 0.9 %
5 SYRINGE (ML) INJECTION PRN
Status: CANCELLED | OUTPATIENT
Start: 2019-10-28

## 2019-10-25 RX ORDER — DIPHENHYDRAMINE HYDROCHLORIDE 50 MG/ML
25 INJECTION INTRAMUSCULAR; INTRAVENOUS ONCE
Status: CANCELLED | OUTPATIENT
Start: 2019-10-28

## 2019-10-25 RX ORDER — DIPHENHYDRAMINE HYDROCHLORIDE 50 MG/ML
50 INJECTION INTRAMUSCULAR; INTRAVENOUS PRN
Status: CANCELLED | OUTPATIENT
Start: 2019-10-29

## 2019-10-25 RX ORDER — PALONOSETRON 0.05 MG/ML
0.25 INJECTION, SOLUTION INTRAVENOUS ONCE
Status: CANCELLED | OUTPATIENT
Start: 2019-10-28

## 2019-10-25 RX ORDER — PALONOSETRON 0.05 MG/ML
0.25 INJECTION, SOLUTION INTRAVENOUS ONCE
Status: CANCELLED
Start: 2019-10-28

## 2019-10-25 RX ORDER — METHYLPREDNISOLONE SODIUM SUCCINATE 125 MG/2ML
125 INJECTION, POWDER, LYOPHILIZED, FOR SOLUTION INTRAMUSCULAR; INTRAVENOUS PRN
Status: CANCELLED | OUTPATIENT
Start: 2019-10-29

## 2019-10-25 RX ORDER — HEPARIN SODIUM (PORCINE) LOCK FLUSH IV SOLN 100 UNIT/ML 100 UNIT/ML
500 SOLUTION INTRAVENOUS PRN
Status: CANCELLED | OUTPATIENT
Start: 2019-10-29

## 2019-10-25 RX ORDER — ACETAMINOPHEN 325 MG/1
1000 TABLET ORAL ONCE
Status: CANCELLED | OUTPATIENT
Start: 2019-10-28

## 2019-10-25 RX ORDER — METHYLPREDNISOLONE SODIUM SUCCINATE 125 MG/2ML
125 INJECTION, POWDER, LYOPHILIZED, FOR SOLUTION INTRAMUSCULAR; INTRAVENOUS PRN
Status: CANCELLED | OUTPATIENT
Start: 2019-10-28

## 2019-10-25 RX ORDER — SODIUM CHLORIDE 0.9 % (FLUSH) 0.9 %
5 SYRINGE (ML) INJECTION PRN
Status: CANCELLED | OUTPATIENT
Start: 2019-10-29

## 2019-10-28 ENCOUNTER — HOSPITAL ENCOUNTER (OUTPATIENT)
Dept: INFUSION THERAPY | Age: 79
Discharge: HOME | End: 2019-10-28
Payer: MEDICARE

## 2019-10-28 VITALS
OXYGEN SATURATION: 99 % | DIASTOLIC BLOOD PRESSURE: 58 MMHG | HEIGHT: 76 IN | WEIGHT: 154.8 LBS | HEART RATE: 78 BPM | BODY MASS INDEX: 18.85 KG/M2 | TEMPERATURE: 96 F | SYSTOLIC BLOOD PRESSURE: 110 MMHG

## 2019-10-28 DIAGNOSIS — C83.39 DIFFUSE LARGE B-CELL LYMPHOMA, EXTRANODAL AND SOLID ORGAN SITES (HCC): ICD-10-CM

## 2019-10-28 DIAGNOSIS — T45.1X5A CHEMOTHERAPY-INDUCED NEUTROPENIA (HCC): Primary | ICD-10-CM

## 2019-10-28 DIAGNOSIS — C83.39 DIFFUSE LARGE B-CELL LYMPHOMA, EXTRANODAL AND SOLID ORGAN SITES (HCC): Primary | ICD-10-CM

## 2019-10-28 DIAGNOSIS — D70.1 CHEMOTHERAPY-INDUCED NEUTROPENIA (HCC): Primary | ICD-10-CM

## 2019-10-28 DIAGNOSIS — C85.90 NON-HODGKIN'S LYMPHOMA, UNSPECIFIED BODY REGION, UNSPECIFIED NON-HODGKIN LYMPHOMA TYPE (HCC): ICD-10-CM

## 2019-10-28 PROCEDURE — 6360000002 HC RX W HCPCS: Performed by: INTERNAL MEDICINE

## 2019-10-28 PROCEDURE — 96415 CHEMO IV INFUSION ADDL HR: CPT

## 2019-10-28 PROCEDURE — 96375 TX/PRO/DX INJ NEW DRUG ADDON: CPT

## 2019-10-28 PROCEDURE — 2580000003 HC RX 258: Performed by: INTERNAL MEDICINE

## 2019-10-28 PROCEDURE — 96417 CHEMO IV INFUS EACH ADDL SEQ: CPT

## 2019-10-28 PROCEDURE — 6370000000 HC RX 637 (ALT 250 FOR IP): Performed by: INTERNAL MEDICINE

## 2019-10-28 PROCEDURE — 80053 COMPREHEN METABOLIC PANEL: CPT

## 2019-10-28 PROCEDURE — 96413 CHEMO IV INFUSION 1 HR: CPT

## 2019-10-28 PROCEDURE — 85025 COMPLETE CBC W/AUTO DIFF WBC: CPT

## 2019-10-28 RX ORDER — SODIUM CHLORIDE 0.9 % (FLUSH) 0.9 %
10 SYRINGE (ML) INJECTION PRN
Status: DISCONTINUED | OUTPATIENT
Start: 2019-10-28 | End: 2019-10-29 | Stop reason: HOSPADM

## 2019-10-28 RX ORDER — DIPHENHYDRAMINE HYDROCHLORIDE 50 MG/ML
25 INJECTION INTRAMUSCULAR; INTRAVENOUS ONCE
Status: COMPLETED | OUTPATIENT
Start: 2019-10-28 | End: 2019-10-28

## 2019-10-28 RX ORDER — HEPARIN SODIUM (PORCINE) LOCK FLUSH IV SOLN 100 UNIT/ML 100 UNIT/ML
500 SOLUTION INTRAVENOUS PRN
Status: DISCONTINUED | OUTPATIENT
Start: 2019-10-28 | End: 2019-10-29 | Stop reason: HOSPADM

## 2019-10-28 RX ORDER — PALONOSETRON 0.05 MG/ML
0.25 INJECTION, SOLUTION INTRAVENOUS ONCE
Status: COMPLETED | OUTPATIENT
Start: 2019-10-28 | End: 2019-10-28

## 2019-10-28 RX ORDER — DEXAMETHASONE SODIUM PHOSPHATE 10 MG/ML
10 INJECTION, SOLUTION INTRAMUSCULAR; INTRAVENOUS ONCE
Status: COMPLETED | OUTPATIENT
Start: 2019-10-28 | End: 2019-10-28

## 2019-10-28 RX ORDER — ACETAMINOPHEN 500 MG
1000 TABLET ORAL ONCE
Status: COMPLETED | OUTPATIENT
Start: 2019-10-28 | End: 2019-10-28

## 2019-10-28 RX ADMIN — DIPHENHYDRAMINE HYDROCHLORIDE 25 MG: 50 INJECTION, SOLUTION INTRAMUSCULAR; INTRAVENOUS at 13:02

## 2019-10-28 RX ADMIN — BENDAMUSTINE HYDROCHLORIDE 138 MG: 25 INJECTION, SOLUTION INTRAVENOUS at 15:30

## 2019-10-28 RX ADMIN — Medication 500 UNITS: at 15:46

## 2019-10-28 RX ADMIN — ACETAMINOPHEN 1000 MG: 500 TABLET, FILM COATED ORAL at 13:02

## 2019-10-28 RX ADMIN — RITUXIMAB 730 MG: 10 INJECTION, SOLUTION INTRAVENOUS at 14:17

## 2019-10-28 RX ADMIN — PALONOSETRON 0.25 MG: 0.05 INJECTION, SOLUTION INTRAVENOUS at 13:02

## 2019-10-28 RX ADMIN — RITUXIMAB 730 MG: 10 INJECTION, SOLUTION INTRAVENOUS at 13:41

## 2019-10-28 RX ADMIN — Medication 10 ML: at 15:46

## 2019-10-28 RX ADMIN — DEXAMETHASONE SODIUM PHOSPHATE 10 MG: 10 INJECTION, SOLUTION INTRAMUSCULAR; INTRAVENOUS at 13:04

## 2019-10-28 RX ADMIN — WATER 2.2 ML: 1 INJECTION INTRAMUSCULAR; INTRAVENOUS; SUBCUTANEOUS at 13:10

## 2019-10-28 RX ADMIN — ALTEPLASE 2 MG: 2.2 INJECTION, POWDER, LYOPHILIZED, FOR SOLUTION INTRAVENOUS at 13:10

## 2019-10-28 ASSESSMENT — PAIN SCALES - GENERAL: PAINLEVEL_OUTOF10: 0

## 2019-10-29 ENCOUNTER — HOSPITAL ENCOUNTER (OUTPATIENT)
Dept: INFUSION THERAPY | Age: 79
Discharge: HOME | End: 2019-10-29
Payer: MEDICARE

## 2019-10-29 VITALS
HEART RATE: 100 BPM | SYSTOLIC BLOOD PRESSURE: 112 MMHG | OXYGEN SATURATION: 96 % | DIASTOLIC BLOOD PRESSURE: 58 MMHG | TEMPERATURE: 96.4 F

## 2019-10-29 DIAGNOSIS — C85.90 NON-HODGKIN'S LYMPHOMA, UNSPECIFIED BODY REGION, UNSPECIFIED NON-HODGKIN LYMPHOMA TYPE (HCC): ICD-10-CM

## 2019-10-29 DIAGNOSIS — R42 ORTHOSTATIC DIZZINESS: Primary | ICD-10-CM

## 2019-10-29 DIAGNOSIS — T45.1X5A CHEMOTHERAPY-INDUCED NEUTROPENIA (HCC): ICD-10-CM

## 2019-10-29 DIAGNOSIS — I95.1 ORTHOSTATIC HYPOTENSION: ICD-10-CM

## 2019-10-29 DIAGNOSIS — C83.39 DIFFUSE LARGE B-CELL LYMPHOMA, EXTRANODAL AND SOLID ORGAN SITES (HCC): ICD-10-CM

## 2019-10-29 DIAGNOSIS — D70.1 CHEMOTHERAPY-INDUCED NEUTROPENIA (HCC): ICD-10-CM

## 2019-10-29 PROCEDURE — 6360000002 HC RX W HCPCS: Performed by: INTERNAL MEDICINE

## 2019-10-29 PROCEDURE — 96415 CHEMO IV INFUSION ADDL HR: CPT

## 2019-10-29 PROCEDURE — 2580000003 HC RX 258: Performed by: INTERNAL MEDICINE

## 2019-10-29 PROCEDURE — 96413 CHEMO IV INFUSION 1 HR: CPT

## 2019-10-29 PROCEDURE — 2500000003 HC RX 250 WO HCPCS: Performed by: INTERNAL MEDICINE

## 2019-10-29 PROCEDURE — 96377 APPLICATON ON-BODY INJECTOR: CPT

## 2019-10-29 PROCEDURE — 96368 THER/DIAG CONCURRENT INF: CPT

## 2019-10-29 PROCEDURE — 96417 CHEMO IV INFUS EACH ADDL SEQ: CPT

## 2019-10-29 RX ORDER — SODIUM CHLORIDE 0.9 % (FLUSH) 0.9 %
10 SYRINGE (ML) INJECTION PRN
Status: DISCONTINUED | OUTPATIENT
Start: 2019-10-29 | End: 2019-10-30 | Stop reason: HOSPADM

## 2019-10-29 RX ORDER — SODIUM CHLORIDE 0.9 % (FLUSH) 0.9 %
5 SYRINGE (ML) INJECTION PRN
Status: DISCONTINUED | OUTPATIENT
Start: 2019-10-29 | End: 2019-10-30 | Stop reason: HOSPADM

## 2019-10-29 RX ORDER — SODIUM CHLORIDE 0.9 % (FLUSH) 0.9 %
10 SYRINGE (ML) INJECTION PRN
Status: CANCELLED | OUTPATIENT
Start: 2019-10-29

## 2019-10-29 RX ORDER — 0.9 % SODIUM CHLORIDE 0.9 %
500 INTRAVENOUS SOLUTION INTRAVENOUS ONCE
Status: DISCONTINUED | OUTPATIENT
Start: 2019-10-29 | End: 2019-10-31 | Stop reason: HOSPADM

## 2019-10-29 RX ORDER — HEPARIN SODIUM (PORCINE) LOCK FLUSH IV SOLN 100 UNIT/ML 100 UNIT/ML
500 SOLUTION INTRAVENOUS PRN
Status: DISCONTINUED | OUTPATIENT
Start: 2019-10-29 | End: 2019-10-30 | Stop reason: HOSPADM

## 2019-10-29 RX ORDER — 0.9 % SODIUM CHLORIDE 0.9 %
500 INTRAVENOUS SOLUTION INTRAVENOUS ONCE
Status: CANCELLED | OUTPATIENT
Start: 2019-10-29

## 2019-10-29 RX ORDER — HEPARIN SODIUM (PORCINE) LOCK FLUSH IV SOLN 100 UNIT/ML 100 UNIT/ML
500 SOLUTION INTRAVENOUS PRN
Status: CANCELLED | OUTPATIENT
Start: 2019-10-29

## 2019-10-29 RX ORDER — SODIUM CHLORIDE 0.9 % (FLUSH) 0.9 %
5 SYRINGE (ML) INJECTION PRN
Status: CANCELLED | OUTPATIENT
Start: 2019-10-29

## 2019-10-29 RX ADMIN — PEGFILGRASTIM 6 MG: KIT SUBCUTANEOUS at 12:26

## 2019-10-29 RX ADMIN — Medication 10 ML: at 12:15

## 2019-10-29 RX ADMIN — DEXAMETHASONE SODIUM PHOSPHATE: 10 INJECTION, SOLUTION INTRAMUSCULAR; INTRAVENOUS at 11:07

## 2019-10-29 RX ADMIN — Medication 500 UNITS: at 12:16

## 2019-10-29 RX ADMIN — BENDAMUSTINE HYDROCHLORIDE 138 MG: 25 INJECTION, SOLUTION INTRAVENOUS at 11:34

## 2019-10-29 RX ADMIN — POLATUZUMAB VEDOTIN 129 MG: 140 INJECTION, POWDER, LYOPHILIZED, FOR SOLUTION INTRAVENOUS at 11:47

## 2019-10-29 ASSESSMENT — PAIN SCALES - GENERAL: PAINLEVEL_OUTOF10: 0

## 2019-10-30 ENCOUNTER — TELEPHONE (OUTPATIENT)
Dept: INFUSION THERAPY | Age: 79
End: 2019-10-30

## 2019-11-04 RX ORDER — PANTOPRAZOLE SODIUM 40 MG/1
TABLET, DELAYED RELEASE ORAL
Qty: 60 TABLET | Refills: 2 | Status: SHIPPED | OUTPATIENT
Start: 2019-11-04 | End: 2020-01-31

## 2019-11-09 ENCOUNTER — HOSPITAL ENCOUNTER (OUTPATIENT)
Facility: HOSPITAL | Age: 79
Setting detail: OBSERVATION
Discharge: HOME HEALTH | End: 2019-11-10
Attending: FAMILY MEDICINE | Admitting: INTERNAL MEDICINE

## 2019-11-09 ENCOUNTER — APPOINTMENT (OUTPATIENT)
Dept: GENERAL RADIOLOGY | Facility: HOSPITAL | Age: 79
End: 2019-11-09

## 2019-11-09 ENCOUNTER — APPOINTMENT (OUTPATIENT)
Dept: CT IMAGING | Facility: HOSPITAL | Age: 79
End: 2019-11-09

## 2019-11-09 DIAGNOSIS — G45.9 TRANSIENT ISCHEMIC ATTACK: Primary | ICD-10-CM

## 2019-11-09 DIAGNOSIS — I48.0 PAROXYSMAL ATRIAL FIBRILLATION WITH RAPID VENTRICULAR RESPONSE (HCC): ICD-10-CM

## 2019-11-09 DIAGNOSIS — Z74.09 IMPAIRED MOBILITY AND ACTIVITIES OF DAILY LIVING: ICD-10-CM

## 2019-11-09 DIAGNOSIS — E87.1 HYPONATREMIA: ICD-10-CM

## 2019-11-09 DIAGNOSIS — Z78.9 IMPAIRED MOBILITY AND ACTIVITIES OF DAILY LIVING: ICD-10-CM

## 2019-11-09 DIAGNOSIS — R53.1 WEAKNESS: ICD-10-CM

## 2019-11-09 DIAGNOSIS — R47.01 APHASIA: ICD-10-CM

## 2019-11-09 DIAGNOSIS — I50.9 ACUTE CONGESTIVE HEART FAILURE, UNSPECIFIED HEART FAILURE TYPE (HCC): ICD-10-CM

## 2019-11-09 LAB
ALBUMIN SERPL-MCNC: 3.6 G/DL (ref 3.5–5.2)
ALBUMIN/GLOB SERPL: 1.5 G/DL
ALP SERPL-CCNC: 78 U/L (ref 39–117)
ALT SERPL W P-5'-P-CCNC: 34 U/L (ref 1–41)
AMMONIA BLD-SCNC: 24 UMOL/L (ref 16–60)
ANION GAP SERPL CALCULATED.3IONS-SCNC: 15 MMOL/L (ref 5–15)
AST SERPL-CCNC: 28 U/L (ref 1–40)
BACTERIA UR QL AUTO: ABNORMAL /HPF
BASOPHILS # BLD AUTO: 0.03 10*3/MM3 (ref 0–0.2)
BASOPHILS NFR BLD AUTO: 0.4 % (ref 0–1.5)
BILIRUB SERPL-MCNC: 0.5 MG/DL (ref 0.2–1.2)
BILIRUB UR QL STRIP: NEGATIVE
BUN BLD-MCNC: 19 MG/DL (ref 8–23)
BUN/CREAT SERPL: 24.7 (ref 7–25)
CALCIUM SPEC-SCNC: 9.4 MG/DL (ref 8.6–10.5)
CHLORIDE SERPL-SCNC: 96 MMOL/L (ref 98–107)
CLARITY UR: CLEAR
CO2 SERPL-SCNC: 21 MMOL/L (ref 22–29)
COLOR UR: YELLOW
CREAT BLD-MCNC: 0.77 MG/DL (ref 0.76–1.27)
DEPRECATED RDW RBC AUTO: 57.1 FL (ref 37–54)
DIGOXIN SERPL-MCNC: 0.9 NG/ML (ref 0.6–1.2)
EOSINOPHIL # BLD AUTO: 0.46 10*3/MM3 (ref 0–0.4)
EOSINOPHIL NFR BLD AUTO: 6.7 % (ref 0.3–6.2)
ERYTHROCYTE [DISTWIDTH] IN BLOOD BY AUTOMATED COUNT: 16.4 % (ref 12.3–15.4)
GFR SERPL CREATININE-BSD FRML MDRD: 97 ML/MIN/1.73
GLOBULIN UR ELPH-MCNC: 2.4 GM/DL
GLUCOSE BLD-MCNC: 230 MG/DL (ref 65–99)
GLUCOSE UR STRIP-MCNC: ABNORMAL MG/DL
HCT VFR BLD AUTO: 28.6 % (ref 37.5–51)
HGB BLD-MCNC: 9.7 G/DL (ref 13–17.7)
HGB UR QL STRIP.AUTO: ABNORMAL
HOLD SPECIMEN: NORMAL
HYALINE CASTS UR QL AUTO: ABNORMAL /LPF
IMM GRANULOCYTES # BLD AUTO: 0.07 10*3/MM3 (ref 0–0.05)
IMM GRANULOCYTES NFR BLD AUTO: 1 % (ref 0–0.5)
KETONES UR QL STRIP: NEGATIVE
LEUKOCYTE ESTERASE UR QL STRIP.AUTO: NEGATIVE
LYMPHOCYTES # BLD AUTO: 0.94 10*3/MM3 (ref 0.7–3.1)
LYMPHOCYTES NFR BLD AUTO: 13.7 % (ref 19.6–45.3)
MCH RBC QN AUTO: 33.7 PG (ref 26.6–33)
MCHC RBC AUTO-ENTMCNC: 33.9 G/DL (ref 31.5–35.7)
MCV RBC AUTO: 99.3 FL (ref 79–97)
MONOCYTES # BLD AUTO: 0.79 10*3/MM3 (ref 0.1–0.9)
MONOCYTES NFR BLD AUTO: 11.5 % (ref 5–12)
NEUTROPHILS # BLD AUTO: 4.59 10*3/MM3 (ref 1.7–7)
NEUTROPHILS NFR BLD AUTO: 66.7 % (ref 42.7–76)
NITRITE UR QL STRIP: NEGATIVE
NRBC BLD AUTO-RTO: 0.6 /100 WBC (ref 0–0.2)
PH UR STRIP.AUTO: 5.5 [PH] (ref 5–8)
PLATELET # BLD AUTO: 124 10*3/MM3 (ref 140–450)
PMV BLD AUTO: 10.7 FL (ref 6–12)
POTASSIUM BLD-SCNC: 4.4 MMOL/L (ref 3.5–5.2)
PROT SERPL-MCNC: 6 G/DL (ref 6–8.5)
PROT UR QL STRIP: NEGATIVE
RBC # BLD AUTO: 2.88 10*6/MM3 (ref 4.14–5.8)
RBC # UR: ABNORMAL /HPF
REF LAB TEST METHOD: ABNORMAL
SODIUM BLD-SCNC: 132 MMOL/L (ref 136–145)
SP GR UR STRIP: 1.01 (ref 1–1.03)
SQUAMOUS #/AREA URNS HPF: ABNORMAL /HPF
UROBILINOGEN UR QL STRIP: ABNORMAL
WBC NRBC COR # BLD: 6.88 10*3/MM3 (ref 3.4–10.8)
WBC UR QL AUTO: ABNORMAL /HPF
WHOLE BLOOD HOLD SPECIMEN: NORMAL
WHOLE BLOOD HOLD SPECIMEN: NORMAL

## 2019-11-09 PROCEDURE — 82140 ASSAY OF AMMONIA: CPT | Performed by: FAMILY MEDICINE

## 2019-11-09 PROCEDURE — 80162 ASSAY OF DIGOXIN TOTAL: CPT | Performed by: FAMILY MEDICINE

## 2019-11-09 PROCEDURE — 96361 HYDRATE IV INFUSION ADD-ON: CPT

## 2019-11-09 PROCEDURE — 99285 EMERGENCY DEPT VISIT HI MDM: CPT

## 2019-11-09 PROCEDURE — 81001 URINALYSIS AUTO W/SCOPE: CPT | Performed by: FAMILY MEDICINE

## 2019-11-09 PROCEDURE — 71045 X-RAY EXAM CHEST 1 VIEW: CPT

## 2019-11-09 PROCEDURE — 93005 ELECTROCARDIOGRAM TRACING: CPT | Performed by: FAMILY MEDICINE

## 2019-11-09 PROCEDURE — 85025 COMPLETE CBC W/AUTO DIFF WBC: CPT | Performed by: FAMILY MEDICINE

## 2019-11-09 PROCEDURE — 80053 COMPREHEN METABOLIC PANEL: CPT | Performed by: FAMILY MEDICINE

## 2019-11-09 PROCEDURE — 93010 ELECTROCARDIOGRAM REPORT: CPT | Performed by: INTERNAL MEDICINE

## 2019-11-09 PROCEDURE — 70450 CT HEAD/BRAIN W/O DYE: CPT

## 2019-11-09 RX ORDER — SODIUM CHLORIDE 9 MG/ML
125 INJECTION, SOLUTION INTRAVENOUS CONTINUOUS
Status: DISCONTINUED | OUTPATIENT
Start: 2019-11-09 | End: 2019-11-10 | Stop reason: HOSPADM

## 2019-11-09 RX ADMIN — SODIUM CHLORIDE 125 ML/HR: 9 INJECTION, SOLUTION INTRAVENOUS at 22:12

## 2019-11-10 ENCOUNTER — APPOINTMENT (OUTPATIENT)
Dept: ULTRASOUND IMAGING | Facility: HOSPITAL | Age: 79
End: 2019-11-10

## 2019-11-10 ENCOUNTER — APPOINTMENT (OUTPATIENT)
Dept: CARDIOLOGY | Facility: HOSPITAL | Age: 79
End: 2019-11-10

## 2019-11-10 ENCOUNTER — APPOINTMENT (OUTPATIENT)
Dept: MRI IMAGING | Facility: HOSPITAL | Age: 79
End: 2019-11-10

## 2019-11-10 VITALS
TEMPERATURE: 98 F | WEIGHT: 157.85 LBS | SYSTOLIC BLOOD PRESSURE: 119 MMHG | DIASTOLIC BLOOD PRESSURE: 60 MMHG | HEIGHT: 74 IN | BODY MASS INDEX: 20.26 KG/M2 | RESPIRATION RATE: 16 BRPM | HEART RATE: 104 BPM | OXYGEN SATURATION: 95 %

## 2019-11-10 PROBLEM — E07.9 DISEASE OF THYROID GLAND: Status: ACTIVE | Noted: 2019-11-10

## 2019-11-10 PROBLEM — R47.01 APHASIA: Status: ACTIVE | Noted: 2019-11-10

## 2019-11-10 PROBLEM — I10 HYPERTENSION: Status: ACTIVE | Noted: 2019-11-10

## 2019-11-10 PROBLEM — E78.5 HYPERLIPIDEMIA: Status: ACTIVE | Noted: 2019-11-10

## 2019-11-10 PROBLEM — G45.9 TRANSIENT ISCHEMIC ATTACK: Status: ACTIVE | Noted: 2019-11-10

## 2019-11-10 PROBLEM — E11.9 DIABETES MELLITUS (HCC): Status: ACTIVE | Noted: 2019-11-10

## 2019-11-10 PROBLEM — G45.9 TRANSIENT ISCHEMIC ATTACK: Status: RESOLVED | Noted: 2019-11-10 | Resolved: 2019-11-10

## 2019-11-10 LAB
ALBUMIN SERPL-MCNC: 3.5 G/DL (ref 3.5–5.2)
ALBUMIN/GLOB SERPL: 1.8 G/DL
ALP SERPL-CCNC: 95 U/L (ref 39–117)
ALT SERPL W P-5'-P-CCNC: 30 U/L (ref 1–41)
ANION GAP SERPL CALCULATED.3IONS-SCNC: 12 MMOL/L (ref 5–15)
ANISOCYTOSIS BLD QL: ABNORMAL
AST SERPL-CCNC: 23 U/L (ref 1–40)
BASOPHILS # BLD MANUAL: 0.25 10*3/MM3 (ref 0–0.2)
BASOPHILS NFR BLD AUTO: 4.1 % (ref 0–1.5)
BH CV ECHO MEAS - AO MAX PG (FULL): 2.2 MMHG
BH CV ECHO MEAS - AO MAX PG: 4.4 MMHG
BH CV ECHO MEAS - AO MEAN PG (FULL): 1.6 MMHG
BH CV ECHO MEAS - AO MEAN PG: 2.6 MMHG
BH CV ECHO MEAS - AO ROOT AREA (BSA CORRECTED): 1.6
BH CV ECHO MEAS - AO ROOT AREA: 8 CM^2
BH CV ECHO MEAS - AO ROOT DIAM: 3.2 CM
BH CV ECHO MEAS - AO V2 MAX: 105 CM/SEC
BH CV ECHO MEAS - AO V2 MEAN: 74.1 CM/SEC
BH CV ECHO MEAS - AO V2 VTI: 13.8 CM
BH CV ECHO MEAS - AVA(I,A): 2.4 CM^2
BH CV ECHO MEAS - AVA(I,D): 2.4 CM^2
BH CV ECHO MEAS - AVA(V,A): 2.4 CM^2
BH CV ECHO MEAS - AVA(V,D): 2.4 CM^2
BH CV ECHO MEAS - BSA(HAYCOCK): 1.9 M^2
BH CV ECHO MEAS - BSA: 2 M^2
BH CV ECHO MEAS - BZI_BMI: 20.2 KILOGRAMS/M^2
BH CV ECHO MEAS - BZI_METRIC_HEIGHT: 188 CM
BH CV ECHO MEAS - BZI_METRIC_WEIGHT: 71.2 KG
BH CV ECHO MEAS - EDV(CUBED): 119.1 ML
BH CV ECHO MEAS - EDV(MOD-SP4): 70 ML
BH CV ECHO MEAS - EDV(TEICH): 113.9 ML
BH CV ECHO MEAS - EF(CUBED): 64.9 %
BH CV ECHO MEAS - EF(MOD-SP4): 63.3 %
BH CV ECHO MEAS - EF(TEICH): 56.3 %
BH CV ECHO MEAS - ESV(CUBED): 41.8 ML
BH CV ECHO MEAS - ESV(MOD-SP4): 25.7 ML
BH CV ECHO MEAS - ESV(TEICH): 49.8 ML
BH CV ECHO MEAS - FS: 29.5 %
BH CV ECHO MEAS - IVS/LVPW: 1.1
BH CV ECHO MEAS - IVSD: 0.96 CM
BH CV ECHO MEAS - LA DIMENSION: 3.5 CM
BH CV ECHO MEAS - LA/AO: 1.1
BH CV ECHO MEAS - LAT PEAK E' VEL: 9.5 CM/SEC
BH CV ECHO MEAS - LV DIASTOLIC VOL/BSA (35-75): 35.7 ML/M^2
BH CV ECHO MEAS - LV MASS(C)D: 159.6 GRAMS
BH CV ECHO MEAS - LV MASS(C)DI: 81.4 GRAMS/M^2
BH CV ECHO MEAS - LV MAX PG: 2.2 MMHG
BH CV ECHO MEAS - LV MEAN PG: 1 MMHG
BH CV ECHO MEAS - LV SYSTOLIC VOL/BSA (12-30): 13.1 ML/M^2
BH CV ECHO MEAS - LV V1 MAX: 73.3 CM/SEC
BH CV ECHO MEAS - LV V1 MEAN: 54.7 CM/SEC
BH CV ECHO MEAS - LV V1 VTI: 9.4 CM
BH CV ECHO MEAS - LVIDD: 4.9 CM
BH CV ECHO MEAS - LVIDS: 3.5 CM
BH CV ECHO MEAS - LVLD AP4: 7.5 CM
BH CV ECHO MEAS - LVLS AP4: 6.5 CM
BH CV ECHO MEAS - LVOT AREA (M): 3.5 CM^2
BH CV ECHO MEAS - LVOT AREA: 3.5 CM^2
BH CV ECHO MEAS - LVOT DIAM: 2.1 CM
BH CV ECHO MEAS - LVPWD: 0.89 CM
BH CV ECHO MEAS - MED PEAK E' VEL: 9.9 CM/SEC
BH CV ECHO MEAS - MV A MAX VEL: 74.7 CM/SEC
BH CV ECHO MEAS - MV DEC TIME: 0.13 SEC
BH CV ECHO MEAS - MV E MAX VEL: 102 CM/SEC
BH CV ECHO MEAS - MV E/A: 1.4
BH CV ECHO MEAS - RAP SYSTOLE: 5 MMHG
BH CV ECHO MEAS - RVSP: 33.3 MMHG
BH CV ECHO MEAS - SI(AO): 56.7 ML/M^2
BH CV ECHO MEAS - SI(CUBED): 39.4 ML/M^2
BH CV ECHO MEAS - SI(LVOT): 16.6 ML/M^2
BH CV ECHO MEAS - SI(MOD-SP4): 22.6 ML/M^2
BH CV ECHO MEAS - SI(TEICH): 32.7 ML/M^2
BH CV ECHO MEAS - SV(AO): 111.1 ML
BH CV ECHO MEAS - SV(CUBED): 77.3 ML
BH CV ECHO MEAS - SV(LVOT): 32.6 ML
BH CV ECHO MEAS - SV(MOD-SP4): 44.3 ML
BH CV ECHO MEAS - SV(TEICH): 64.1 ML
BH CV ECHO MEAS - TR MAX VEL: 266 CM/SEC
BH CV ECHO MEASUREMENTS AVERAGE E/E' RATIO: 10.52
BILIRUB SERPL-MCNC: 0.6 MG/DL (ref 0.2–1.2)
BUN BLD-MCNC: 16 MG/DL (ref 8–23)
BUN/CREAT SERPL: 21.6 (ref 7–25)
CALCIUM SPEC-SCNC: 8.6 MG/DL (ref 8.6–10.5)
CHLORIDE SERPL-SCNC: 101 MMOL/L (ref 98–107)
CHOLEST SERPL-MCNC: 131 MG/DL (ref 0–200)
CO2 SERPL-SCNC: 21 MMOL/L (ref 22–29)
CREAT BLD-MCNC: 0.74 MG/DL (ref 0.76–1.27)
DEPRECATED RDW RBC AUTO: 56.8 FL (ref 37–54)
EOSINOPHIL # BLD MANUAL: 0.43 10*3/MM3 (ref 0–0.4)
EOSINOPHIL NFR BLD MANUAL: 7.1 % (ref 0.3–6.2)
ERYTHROCYTE [DISTWIDTH] IN BLOOD BY AUTOMATED COUNT: 16.3 % (ref 12.3–15.4)
GFR SERPL CREATININE-BSD FRML MDRD: 102 ML/MIN/1.73
GLOBULIN UR ELPH-MCNC: 2 GM/DL
GLUCOSE BLD-MCNC: 154 MG/DL (ref 65–99)
GLUCOSE BLDC GLUCOMTR-MCNC: 147 MG/DL (ref 70–130)
GLUCOSE BLDC GLUCOMTR-MCNC: 159 MG/DL (ref 70–130)
GLUCOSE BLDC GLUCOMTR-MCNC: 185 MG/DL (ref 70–130)
HCT VFR BLD AUTO: 26.6 % (ref 37.5–51)
HDLC SERPL-MCNC: 18 MG/DL (ref 40–60)
HGB BLD-MCNC: 9 G/DL (ref 13–17.7)
LDLC SERPL CALC-MCNC: ABNORMAL MG/DL
LDLC/HDLC SERPL: ABNORMAL {RATIO}
LEFT ATRIUM VOLUME INDEX: 30 ML/M2
LEFT ATRIUM VOLUME: 67.1 CM3
LV EF 2D ECHO EST: 60 %
LYMPHOCYTES # BLD MANUAL: 0.67 10*3/MM3 (ref 0.7–3.1)
LYMPHOCYTES NFR BLD MANUAL: 11.2 % (ref 19.6–45.3)
LYMPHOCYTES NFR BLD MANUAL: 3.1 % (ref 5–12)
MAGNESIUM SERPL-MCNC: 1.5 MG/DL (ref 1.6–2.4)
MAXIMAL PREDICTED HEART RATE: 141 BPM
MCH RBC QN AUTO: 33.6 PG (ref 26.6–33)
MCHC RBC AUTO-ENTMCNC: 33.8 G/DL (ref 31.5–35.7)
MCV RBC AUTO: 99.3 FL (ref 79–97)
MONOCYTES # BLD AUTO: 0.19 10*3/MM3 (ref 0.1–0.9)
NEUTROPHILS # BLD AUTO: 4.46 10*3/MM3 (ref 1.7–7)
NEUTROPHILS NFR BLD MANUAL: 62.2 % (ref 42.7–76)
NEUTS BAND NFR BLD MANUAL: 12.2 % (ref 0–5)
PHOSPHATE SERPL-MCNC: 2.2 MG/DL (ref 2.5–4.5)
PLAT MORPH BLD: NORMAL
PLATELET # BLD AUTO: 127 10*3/MM3 (ref 140–450)
PMV BLD AUTO: 11.7 FL (ref 6–12)
POLYCHROMASIA BLD QL SMEAR: ABNORMAL
POTASSIUM BLD-SCNC: 3.8 MMOL/L (ref 3.5–5.2)
PROT SERPL-MCNC: 5.5 G/DL (ref 6–8.5)
RBC # BLD AUTO: 2.68 10*6/MM3 (ref 4.14–5.8)
SODIUM BLD-SCNC: 134 MMOL/L (ref 136–145)
STRESS TARGET HR: 120 BPM
TRIGL SERPL-MCNC: 422 MG/DL (ref 0–150)
TROPONIN T SERPL-MCNC: 0.06 NG/ML (ref 0–0.03)
TROPONIN T SERPL-MCNC: 0.07 NG/ML (ref 0–0.03)
TROPONIN T SERPL-MCNC: 0.07 NG/ML (ref 0–0.03)
TSH SERPL DL<=0.05 MIU/L-ACNC: 1.25 UIU/ML (ref 0.27–4.2)
VLDLC SERPL-MCNC: ABNORMAL MG/DL
WBC MORPH BLD: NORMAL
WBC NRBC COR # BLD: 5.99 10*3/MM3 (ref 3.4–10.8)

## 2019-11-10 PROCEDURE — 80053 COMPREHEN METABOLIC PANEL: CPT | Performed by: INTERNAL MEDICINE

## 2019-11-10 PROCEDURE — 94760 N-INVAS EAR/PLS OXIMETRY 1: CPT

## 2019-11-10 PROCEDURE — 83735 ASSAY OF MAGNESIUM: CPT | Performed by: INTERNAL MEDICINE

## 2019-11-10 PROCEDURE — 80061 LIPID PANEL: CPT | Performed by: INTERNAL MEDICINE

## 2019-11-10 PROCEDURE — 99205 OFFICE O/P NEW HI 60 MIN: CPT | Performed by: PSYCHIATRY & NEUROLOGY

## 2019-11-10 PROCEDURE — 85007 BL SMEAR W/DIFF WBC COUNT: CPT | Performed by: INTERNAL MEDICINE

## 2019-11-10 PROCEDURE — A9577 INJ MULTIHANCE: HCPCS | Performed by: INTERNAL MEDICINE

## 2019-11-10 PROCEDURE — 93005 ELECTROCARDIOGRAM TRACING: CPT | Performed by: INTERNAL MEDICINE

## 2019-11-10 PROCEDURE — 93306 TTE W/DOPPLER COMPLETE: CPT

## 2019-11-10 PROCEDURE — 82962 GLUCOSE BLOOD TEST: CPT

## 2019-11-10 PROCEDURE — 93880 EXTRACRANIAL BILAT STUDY: CPT

## 2019-11-10 PROCEDURE — 93306 TTE W/DOPPLER COMPLETE: CPT | Performed by: INTERNAL MEDICINE

## 2019-11-10 PROCEDURE — 94799 UNLISTED PULMONARY SVC/PX: CPT

## 2019-11-10 PROCEDURE — 83721 ASSAY OF BLOOD LIPOPROTEIN: CPT | Performed by: INTERNAL MEDICINE

## 2019-11-10 PROCEDURE — G0378 HOSPITAL OBSERVATION PER HR: HCPCS

## 2019-11-10 PROCEDURE — 85025 COMPLETE CBC W/AUTO DIFF WBC: CPT | Performed by: INTERNAL MEDICINE

## 2019-11-10 PROCEDURE — 84484 ASSAY OF TROPONIN QUANT: CPT | Performed by: INTERNAL MEDICINE

## 2019-11-10 PROCEDURE — 93880 EXTRACRANIAL BILAT STUDY: CPT | Performed by: SURGERY

## 2019-11-10 PROCEDURE — 25010000002 MAGNESIUM SULFATE 2 GM/50ML SOLUTION: Performed by: NURSE PRACTITIONER

## 2019-11-10 PROCEDURE — 84443 ASSAY THYROID STIM HORMONE: CPT | Performed by: INTERNAL MEDICINE

## 2019-11-10 PROCEDURE — 63710000001 INSULIN LISPRO (HUMAN) PER 5 UNITS: Performed by: INTERNAL MEDICINE

## 2019-11-10 PROCEDURE — 96365 THER/PROPH/DIAG IV INF INIT: CPT

## 2019-11-10 PROCEDURE — 0 GADOBENATE DIMEGLUMINE 529 MG/ML SOLUTION: Performed by: INTERNAL MEDICINE

## 2019-11-10 PROCEDURE — 96361 HYDRATE IV INFUSION ADD-ON: CPT

## 2019-11-10 PROCEDURE — 70553 MRI BRAIN STEM W/O & W/DYE: CPT

## 2019-11-10 PROCEDURE — 93010 ELECTROCARDIOGRAM REPORT: CPT | Performed by: INTERNAL MEDICINE

## 2019-11-10 PROCEDURE — 84100 ASSAY OF PHOSPHORUS: CPT | Performed by: INTERNAL MEDICINE

## 2019-11-10 PROCEDURE — 25010000002 PERFLUTREN 6.52 MG/ML SUSPENSION: Performed by: INTERNAL MEDICINE

## 2019-11-10 RX ORDER — HYDROCODONE BITARTRATE AND ACETAMINOPHEN 7.5; 325 MG/1; MG/1
1 TABLET ORAL EVERY 6 HOURS PRN
Status: DISCONTINUED | OUTPATIENT
Start: 2019-11-10 | End: 2019-11-10 | Stop reason: HOSPADM

## 2019-11-10 RX ORDER — CETIRIZINE HYDROCHLORIDE 10 MG/1
10 TABLET ORAL DAILY
Status: DISCONTINUED | OUTPATIENT
Start: 2019-11-10 | End: 2019-11-10 | Stop reason: HOSPADM

## 2019-11-10 RX ORDER — SODIUM CHLORIDE 0.9 % (FLUSH) 0.9 %
10 SYRINGE (ML) INJECTION EVERY 12 HOURS SCHEDULED
Status: DISCONTINUED | OUTPATIENT
Start: 2019-11-10 | End: 2019-11-10 | Stop reason: HOSPADM

## 2019-11-10 RX ORDER — ONDANSETRON 2 MG/ML
4 INJECTION INTRAMUSCULAR; INTRAVENOUS EVERY 6 HOURS PRN
Status: DISCONTINUED | OUTPATIENT
Start: 2019-11-10 | End: 2019-11-10 | Stop reason: HOSPADM

## 2019-11-10 RX ORDER — ALLOPURINOL 300 MG/1
150 TABLET ORAL DAILY
COMMUNITY

## 2019-11-10 RX ORDER — ACETAMINOPHEN 650 MG/1
650 SUPPOSITORY RECTAL EVERY 4 HOURS PRN
Status: DISCONTINUED | OUTPATIENT
Start: 2019-11-10 | End: 2019-11-10 | Stop reason: HOSPADM

## 2019-11-10 RX ORDER — ACETAMINOPHEN 160 MG/5ML
650 SOLUTION ORAL EVERY 4 HOURS PRN
Status: DISCONTINUED | OUTPATIENT
Start: 2019-11-10 | End: 2019-11-10 | Stop reason: HOSPADM

## 2019-11-10 RX ORDER — LANOLIN ALCOHOL/MO/W.PET/CERES
3 CREAM (GRAM) TOPICAL NIGHTLY
Status: DISCONTINUED | OUTPATIENT
Start: 2019-11-10 | End: 2019-11-10 | Stop reason: HOSPADM

## 2019-11-10 RX ORDER — PANTOPRAZOLE SODIUM 40 MG/1
40 TABLET, DELAYED RELEASE ORAL 2 TIMES DAILY
Status: DISCONTINUED | OUTPATIENT
Start: 2019-11-10 | End: 2019-11-10 | Stop reason: HOSPADM

## 2019-11-10 RX ORDER — MAGNESIUM SULFATE HEPTAHYDRATE 40 MG/ML
2 INJECTION, SOLUTION INTRAVENOUS ONCE
Status: COMPLETED | OUTPATIENT
Start: 2019-11-10 | End: 2019-11-10

## 2019-11-10 RX ORDER — SODIUM CHLORIDE 0.9 % (FLUSH) 0.9 %
10 SYRINGE (ML) INJECTION AS NEEDED
Status: DISCONTINUED | OUTPATIENT
Start: 2019-11-10 | End: 2019-11-10 | Stop reason: HOSPADM

## 2019-11-10 RX ORDER — ACETAMINOPHEN 325 MG/1
650 TABLET ORAL EVERY 4 HOURS PRN
Status: DISCONTINUED | OUTPATIENT
Start: 2019-11-10 | End: 2019-11-10 | Stop reason: HOSPADM

## 2019-11-10 RX ORDER — FINASTERIDE 5 MG/1
5 TABLET, FILM COATED ORAL DAILY
Status: DISCONTINUED | OUTPATIENT
Start: 2019-11-10 | End: 2019-11-10 | Stop reason: HOSPADM

## 2019-11-10 RX ORDER — DIGOXIN 125 MCG
125 TABLET ORAL DAILY
Status: DISCONTINUED | OUTPATIENT
Start: 2019-11-10 | End: 2019-11-10 | Stop reason: HOSPADM

## 2019-11-10 RX ORDER — ALLOPURINOL 300 MG/1
300 TABLET ORAL DAILY
Status: DISCONTINUED | OUTPATIENT
Start: 2019-11-10 | End: 2019-11-10 | Stop reason: HOSPADM

## 2019-11-10 RX ADMIN — METFORMIN HYDROCHLORIDE 1000 MG: 500 TABLET ORAL at 08:46

## 2019-11-10 RX ADMIN — APIXABAN 5 MG: 5 TABLET, FILM COATED ORAL at 08:46

## 2019-11-10 RX ADMIN — SODIUM CHLORIDE 125 ML/HR: 9 INJECTION, SOLUTION INTRAVENOUS at 13:56

## 2019-11-10 RX ADMIN — HYDROCODONE BITARTRATE AND ACETAMINOPHEN 1 TABLET: 7.5; 325 TABLET ORAL at 11:01

## 2019-11-10 RX ADMIN — PANTOPRAZOLE SODIUM 40 MG: 40 TABLET, DELAYED RELEASE ORAL at 10:51

## 2019-11-10 RX ADMIN — SODIUM CHLORIDE 125 ML/HR: 9 INJECTION, SOLUTION INTRAVENOUS at 02:51

## 2019-11-10 RX ADMIN — GADOBENATE DIMEGLUMINE 13 ML: 529 INJECTION, SOLUTION INTRAVENOUS at 11:30

## 2019-11-10 RX ADMIN — ALLOPURINOL 300 MG: 300 TABLET ORAL at 08:46

## 2019-11-10 RX ADMIN — METOPROLOL TARTRATE 12.5 MG: 25 TABLET, FILM COATED ORAL at 08:46

## 2019-11-10 RX ADMIN — FINASTERIDE 5 MG: 5 TABLET, FILM COATED ORAL at 08:46

## 2019-11-10 RX ADMIN — CETIRIZINE HYDROCHLORIDE 10 MG: 10 TABLET, FILM COATED ORAL at 08:46

## 2019-11-10 RX ADMIN — MAGNESIUM SULFATE HEPTAHYDRATE 2 G: 40 INJECTION, SOLUTION INTRAVENOUS at 14:50

## 2019-11-10 RX ADMIN — PERFLUTREN 9.78 MG: 6.52 INJECTION, SUSPENSION INTRAVENOUS at 10:05

## 2019-11-10 RX ADMIN — DIGOXIN 125 MCG: 125 TABLET ORAL at 08:46

## 2019-11-10 RX ADMIN — SODIUM CHLORIDE, PRESERVATIVE FREE 10 ML: 5 INJECTION INTRAVENOUS at 08:47

## 2019-11-10 NOTE — CONSULTS
MEDICAL ONCOLOGY CONSULTATION      Pt Name: Aram Nunez  YOB: 1940  MRN: [id number]  Room: 342    Date of admission: 11/9/2019  Date of evaluation: 11/10/2019  Referring Physician: Nasir Cordon,   [address] [place] [address]   Reason for Consultation: B-Cell Lymphoma, continuity of care    History Obtained From: History obtained from chart review and the patient and his spouse.    CHIEF COMPLAINT:    Chief Complaint   Patient presents with   • Altered Mental Status     HISTORY OF PRESENT ILLNESS:   Mr. 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   He had disease recurrence 7/16/2019 in the left chest wall and completed curative intention RT in August 2019.   Widespread recurrence was detected on imaging dated 9/26/2019.  Cycle #1 of second line palliative chemotherapy with rituximab, bendamustine and polatuzumab was initiated 10/7/19. He started cycle #2 on 11/4/19.    Mr. Nunez was admitted 11/9/2019 for new onset altered mental status.  Noncontrast CT head 11/9/2019 showed no evidence of acute intracranial process.  Mild cerebral and cerebellar volume loss with chronic microvascular disease was noted.  He is scheduled for MRI of the brain this morning (11/10/2019).  CXR shows nodular consolidation in the periphery of the left upper lobe, possibly posttreatment changes.  Marked interval decrease in size of the pleural-based mass within the periphery of the GREY  UA 3+ glucose, small amount of blood  He continues Eliquis for a history of atrial fibrillation.      HEMATOLOGY HISTORY:  • Lymphoma   Diffuse large b-cell lymphoma, extranodal and solid organ sites (HCC)     Non-Hodgkin lymphoma, DLBC   Stage IVB   ABC phenotype   FISH rearrangement negative for BCL2, BCL6 and c-Myc   Bone marrow positive for clonal B-cell gene rearrangement  (minimal bone marrow involvement by lymphoma)   IPS score 4   Oldjvxaukkouas-hzsiizbbexeuo-ptxrogg??   Lymphoma relapse(chest wall recurrence), July 2019  Treatment summary  01/18/2019-4/15/2019-R-CHOP chemotherapy   Revlimid 15 mg by mouth daily, days 1-10 starting with cycle #3 through cycle#5   Completion of RT right chest wall recurrence-August 2019  Polatuzumab, Bendamustine and Rituximab.     Mr Aram Nunez was seen in initial oncology consultation on 1/11/2018 referred for a diagnosis of non-Hodgkin lymphoma. He initially presented on 12/24/2018 to the ER department at [facility].  12/24/2018-CT chest showed at [facility] a left upper lobe with a pleural based 7.6 x 4.1 x 9.4 cm mass which may infiltrate the chest wall. Enlarged lymph node at the left lateral aspect of the pulmonary artery measures 2.5 cm.   12/26/[id number] biopsy compatible with large B cell malignant lymphoma, favor high grade. Flow cytometry identifies a population of monoclonal B lymphoid cells restricted to kappa light chain expression. These cells are CD5 and CD10 negative. CD20 is strongly and diffusely positive within the cells supporting a diagnosis of a B cell malignant lymphoma. Ki-67 marks approximate 50% of the cells which supports a diagnosis of a high-grade lymphoma.   1/11/2019-she was first seen by me. Recommended PET scan, bone marrow biopsy   1/14/2018-PET scan showed abnormal hypermetabolic uptake in the left upper lobe, left AP window mediastinal adenopathy measuring 4.3 x 2.9 cm with SUV 6. Mid abdomen mesenteric mass measuring 3 cm and SUV 5.2.   1/16/2019- Bone marrow biopsy documented overall normal cellular for age (~15 -20% ) with trilineage hematopoiesis and no increased blasts. Immunohistochemical stain with CD20 and CD79a show minimally increased small B-cells with an atypical interstitial pattern of distribution and focal clustering, <5% of total bone marrow cellularity. Flow cytometry revealed  monoclonal B-cell population (~6% of total cellular events and ~36% of total lymphocytes), consistent with a B-cell lymphoproliferative disorder. Molecular report was positive for clonal B-cell gene rearrangement. 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   1/16/2019- 2-D echocardiogram documented an estimated ejection fraction of 66-70%   1/18/2019- initiation of systemic chemotherapy with R CHOP.   3/4/2019 - initiated Revlimid 15 mg daily, days 1 through 10 on a 21 day cycle , with cycle #3 of R CHOP.   3/11/2019-CT chest, abdomen, pelvis showed almost complete resolution of previous sites of lymphoma.   5/14/2019-2-D echo showed EF 41-45%. Mitral valve regurgitation. He was hospitalized at [facility] with heart failure.   5/28/2019-PET scan showed faint uptake in the left chest wall in the region of the previously seen hypermetabolic mass. No adjacent mass is noted. No hypermetabolic adenopathy.   7/16/2019-CT chest, abdomen, pelvis showed a 8.3 x 2.3 cm soft tissue mass involving left second, third, fourth and fifth ribs in the left upper pleura chest wall region consistent with lymphoma recurrence.   7/16/2019-referred for palliative radiation left chest wall mass.   August 2019-Completion of definitive radiation.  9/26/2019- CT chest abdomen pelvis showed new 3.1 cm enhancing solid mass in the left tracheoesophageal groove at the level of the thyroid gland, concerning for disease progression. Previously identified recurrent left chest wall mass is essentially resolved, with only a minimal residual pleural thickening may reflect a reactive change and/or scarring. There is a 1.2 cm soft tissue nodule in the left AP window which is stable. New minimal groundglass nodularity in the left upper lobe which may reflect an early radiation-induced change if recent chest wall radiation. New 2.4 cm soft tissue implant identified in the fat just  above the left hemidiaphragm, concerning for  lymphoma progression. Additional 1.4 cm soft tissue nodule in the retroperitoneal space adjacent to the aorta which is enlarged from the prior exam, also concerning for lymphoma.  10/7/2019- initiation of second line palliative chemotherapy with bendamustine/rituximab and Polatuzumab.     History  Past Medical History:   Diagnosis Date   • A-fib (CMS/HCC)    • Arthritis    • Cancer (CMS/HCC)     skin   • Diabetes mellitus (CMS/HCC)     borderline, no medication   • Disease of thyroid gland    • Dysrhythmia    • HL (hearing loss)    • Hyperlipidemia    • Hypertension    • IBS (irritable bowel syndrome)    • Lung mass 12/24/2018   • Neuropathy    • Non Hodgkin's lymphoma (CMS/HCC)      Past Surgical History:   Procedure Laterality Date   • CARDIAC CATHETERIZATION     • PARATHYROID GLAND SURGERY     • PROSTATE SURGERY      PROSTATECTOMY   • SKIN CANCER EXCISION       Family History   Problem Relation Age of Onset   • Heart disease Mother    • Cancer Father    • Heart disease Brother       Social History     Tobacco Use   • Smoking status: Never Smoker   • Smokeless tobacco: Never Used   Substance Use Topics   • Alcohol use: No   • Drug use: No     Medications Prior to Admission   Medication Sig Dispense Refill Last Dose   • allopurinol (ZYLOPRIM) 300 MG tablet Take 300 mg by mouth Daily.      • Empagliflozin (JARDIANCE) 10 MG tablet Take 10 mg by mouth Daily.      • apixaban (ELIQUIS) 5 MG tablet tablet Take 1 tablet by mouth Every 12 (Twelve) Hours. 60 tablet 11 Taking   • B Complex Vitamins (VITAMIN B COMPLEX) capsule capsule Take 1 capsule by mouth Daily.   Taking   • digoxin (LANOXIN) 125 MCG tablet Take 1 tablet by mouth Daily. 30 tablet 11 Taking   • finasteride (PROSCAR) 5 MG tablet Take 5 mg by mouth Daily.   Taking   • furosemide (LASIX) 20 MG tablet Take 1 tablet by mouth Daily. (Patient taking differently: Take 5 mg by mouth Daily.) 30 tablet 11 Taking   • HYDROcodone-acetaminophen (NORCO) 7.5-325 MG per  tablet Take 1 tablet by mouth Every 6 (Six) Hours As Needed for Moderate Pain .   Taking   • Lidocaine-Prilocaine, Bulk, 2.5-2.5 % cream Apply 1 application topically to the appropriate area as directed As Needed (APPLY TO AREA OF PORT 30 MIN- 1 HR PRIOR TO PORT ACCESS).   Taking   • loratadine (CLARITIN) 10 MG tablet Take 10 mg by mouth Daily.   Taking   • megestrol (MEGACE) 40 MG/ML suspension Take 400 mg by mouth Daily.   Taking   • melatonin 5 MG tablet tablet Take 5 mg by mouth Every Night.   Taking   • metFORMIN (GLUCOPHAGE) 1000 MG tablet Take 1,000 mg by mouth 2 (Two) Times a Day With Meals.   Taking   • metoprolol tartrate (LOPRESSOR) 25 MG tablet Take 0.5 tablets by mouth Every 12 (Twelve) Hours. 60 tablet 2 Taking   • pantoprazole (PROTONIX) 40 MG EC tablet Take 40 mg by mouth 2 (Two) Times a Day.   Taking   • potassium chloride (K-DUR,KLOR-CON) 20 MEQ CR tablet Take 1 tablet by mouth Daily. 30 tablet 11 Taking   • traZODone (DESYREL) 50 MG tablet Take 50 mg by mouth Every Night.   Taking      Scheduled Meds:    allopurinol 300 mg Oral Daily   apixaban 5 mg Oral Q12H   cetirizine 10 mg Oral Daily   digoxin 125 mcg Oral Daily   finasteride 5 mg Oral Daily   insulin lispro 2-7 Units Subcutaneous 4x Daily With Meals & Nightly   melatonin 3 mg Oral Nightly   metFORMIN 1,000 mg Oral BID With Meals   metoprolol tartrate 12.5 mg Oral Q12H   pantoprazole 40 mg Oral BID   sodium chloride 10 mL Intravenous Q12H     PRN Meds:  •  acetaminophen **OR** acetaminophen **OR** acetaminophen  •  glucagon (human recombinant)  •  HYDROcodone-acetaminophen  •  ondansetron  •  sodium chloride   Allergies:  Patient has no known allergies.      Subjective   REVIEW OF SYSTEMS:   Review of Systems   Constitutional: Positive for activity change, appetite change, fatigue and unexpected weight change (weight loss ). Negative for chills.        No night sweats   HENT: Positive for sore throat. Negative for dental problem, hearing  loss, mouth sores, nosebleeds and trouble swallowing.    Eyes: Positive for visual disturbance (cataracts ).             Respiratory: Negative for cough, shortness of breath and wheezing.         No hemoptysis   Cardiovascular: Negative for chest pain, palpitations and leg swelling.   Gastrointestinal: Negative for abdominal pain, constipation, diarrhea, nausea and vomiting.   Endocrine: Negative for cold intolerance, heat intolerance, polydipsia and polyuria.   Genitourinary: Negative for dysuria, frequency, hematuria and urgency.   Musculoskeletal: Negative for arthralgias, joint swelling and myalgias.        Generalized weakness   Skin: Negative for pallor and rash.   Allergic/Immunologic: Negative for immunocompromised state.   Neurological: Positive for weakness. Negative for seizures, syncope and numbness.        Difficulty finding words, improved this am   Hematological: Negative for adenopathy. Does not bruise/bleed easily.   Psychiatric/Behavioral: Positive for confusion (resolved this am). Negative for suicidal ideas.       Objective   PHYSICAL EXAM:  Physical Exam   Constitutional: He is oriented to person, place, and time. He appears well-developed. No distress.   HENT:   Head: Normocephalic and atraumatic.   Right Ear: External ear normal.   Left Ear: External ear normal.   Mild whitish tan coating on back of tongue   Eyes: EOM are normal. No scleral icterus.   Neck: Neck supple. No tracheal deviation present.   Cardiovascular: An irregularly irregular rhythm present.   Pulmonary/Chest: Effort normal and breath sounds normal. No respiratory distress. He has no wheezes. He has no rales.   Abdominal: Soft. Bowel sounds are normal. He exhibits no distension and no mass.   No hepatosplenomegaly     Genitourinary:   Genitourinary Comments: Exam deferred.   Musculoskeletal: He exhibits no edema or tenderness.   Generalized weakness. Follows commands, moves all 4 extremities    Lymphadenopathy:   No bulky  "palpable cervical, clavicular, axillary or inguinal adenopathies on the left or right.     Neurological: He is alert and oriented to person, place, and time.   Follows commands. Non-focal.     Skin: Skin is warm. No rash noted. He is not diaphoretic.   clammy   Psychiatric: He has a normal mood and affect. His behavior is normal. Thought content normal.   Vitals reviewed.      Vital Signs   /54 (BP Location: Right arm, Patient Position: Lying)   Pulse 101   Temp 98.9 °F (37.2 °C) (Oral)   Resp 18   Ht 188 cm (74\")   Wt 71.6 kg (157 lb 12.8 oz)   SpO2 96%   BMI 20.26 kg/m²     Intake/Output Summary (Last 24 hours) at 11/10/2019 0747  Last data filed at 11/10/2019 0507  Gross per 24 hour   Intake 1000 ml   Output 1000 ml   Net 0 ml       Labs:  CBC  Results from last 7 days   Lab Units 11/10/19  0437 11/09/19  2151   WBC 10*3/mm3 5.99 6.88   HEMOGLOBIN g/dL 9.0* 9.7*   HEMATOCRIT % 26.6* 28.6*   PLATELETS 10*3/mm3 127* 124*       Lab Results   Component Value Date     (L) 11/10/2019    K 3.8 11/10/2019     11/10/2019    CO2 21.0 (L) 11/10/2019    BUN 16 11/10/2019    CREATININE 0.74 (L) 11/10/2019    GLUCOSE 154 (H) 11/10/2019    CALCIUM 8.6 11/10/2019    BILITOT 0.6 11/10/2019    ALKPHOS 95 11/10/2019    AST 23 11/10/2019    ALT 30 11/10/2019    AGRATIO 1.8 11/10/2019    GLOB 2.0 11/10/2019       Lab Results   Component Value Date    INR 1.08 05/15/2019    INR 1.02 12/26/2018    INR 1.04 12/24/2018    PROTIME 14.4 05/15/2019    PROTIME 13.7 12/26/2018    PROTIME 13.9 12/24/2018     Cultures:  No results found for: BLOODCX  No components found for: URINCX    ASSESSMENT/PLAN:    1.  Relapsed Non-Hodgkin lymphoma, DLBC  Cycle #2 rituximab, bendamustine and polatuzumab initiated 11/4/19, with Neulasta support  Creatinine 0.74,       2.  AMS  Noncontrast CT head 11/9/2019 showed no evidence of acute intracranial process.  Mild cerebral and cerebellar volume loss with chronic microvascular " disease was noted.  MRI of the brain planned this morning (11/10/2019).    3.  Anemia, r/t disease process/chemotherapy  Hgb 9.0, MCV 99.3  WBC stable, 5.99, abs lymphocytes 0.67  Platelets 127,00    4.  Hypomagnesemia/hypophosphatemia  Mag 1.5, per attending  Phos 2.2, per attending    5.  Medication induced diabetes   this am  UA 3+ glucose  Per attending    6.  A-fib  On Eliquis  Echocardiogram 5/15/2019 showed mildly decreased LV systolic function, EF 41-45%.  Mild mitral valve regurg, small (<1 cm) pericardial effusion.  Digoxin level 0.90, normal  TSH 1.250    7.  Generalized weakness  Consider PT following MRI/neuro evaluation    Mr. Nnuez was seen and examined. AMS improved. Speech complaint improved.  He is awake, alert and oriented. He converses and is appropriate.  His main complaints are of mild sore throat, decreased appetite, weight loss and significant weakness.    Plan of care discussed with Mr. Nunez and his wife.    DERREK Malik  11/10/19  7:47 AM    Physicians attestation and contribution:    I, Omar White, personally and independently performed an evaluation on Aram Nunez  I have reviewed relevant medical information/data to include but not limited to the medication list, relevant appropriate lab work and imaging when applicable.  I reviewed other physician's notes, ancillary services and nurses assessments.  I have reviewed the above documentation completed by Marta ANGLIN  Please see my additional addended and/or modified contributions to the history of present illness, physical examination and assessment/medical decision-making and plan that reflects my findings and impressions.  I discussed the essential elements of the care plan with Marta ANGLIN and the patient.  I have encouraged and answered all the questions raised to the patient's understanding and satisfaction.  I concur with the above stated.    Subjective: Mr. Nunez was  seen and examined on 11/10/2019 a.m. during rounds.  He continued to be weak.    Objective: Lungs are relatively clear heart is regular abdomen is soft and benign    Assessment/plan: Plan is to monitor conservatively.  He does have an outpatient follow-up arranged    Omar White MD  11/13/2019 8:21 PM

## 2019-11-10 NOTE — PLAN OF CARE
Problem: Patient Care Overview  Goal: Plan of Care Review  Outcome: Ongoing (interventions implemented as appropriate)   11/10/19 1990   Coping/Psychosocial   Plan of Care Reviewed With patient   Plan of Care Review   Progress improving   OTHER   Outcome Summary Pt alert and oriented x4. C/o pain, prn meds controlling pain. Up with assistance when out of bed. Pt had many fall at home. Family at bedside.       Problem: Fall Risk (Adult)  Goal: Identify Related Risk Factors and Signs and Symptoms  Outcome: Ongoing (interventions implemented as appropriate)    Goal: Absence of Fall  Outcome: Ongoing (interventions implemented as appropriate)      Problem: Stroke (Ischemic) (Adult)  Goal: Signs and Symptoms of Listed Potential Problems Will be Absent, Minimized or Managed (Stroke)  Outcome: Ongoing (interventions implemented as appropriate)

## 2019-11-10 NOTE — H&P
[facility] Medicine Services  HISTORY AND PHYSICAL    Date of Admission: 11/9/2019  Primary Care Physician: Woody Mathis DO    Subjective     Chief Complaint: Confusion    History of Present Illness  79-year-old  male admitted to the emergency department with past medical history of B-cell lymphoma.  He is currently getting a new chemotherapy Polivy.  The patient started having difficulty with word finding, and not making sense at home.  The family states that he was not himself.  Started about 230 this afternoon.  He had some slight decline all along, but worsening today.  The patient is somewhat frustrated.  He just tells me that he feels bad.  He has generalized weakness.  He states that he is unable to do anything.  He has a history of A. fib and takes Eliquis, and sees Dr. Meyer.  CT of the head was negative.        Review of Systems   Confusion  Generalized weakness    Otherwise complete ROS reviewed and negative except as mentioned in the HPI.    Past Medical History:   Past Medical History:   Diagnosis Date   • A-fib (CMS/HCC)    • Arthritis    • Cancer (CMS/HCC)     skin   • Diabetes mellitus (CMS/HCC)     borderline, no medication   • Disease of thyroid gland    • Dysrhythmia    • HL (hearing loss)    • Hyperlipidemia    • Hypertension    • IBS (irritable bowel syndrome)    • Lung mass 12/24/2018   • Neuropathy    • Non Hodgkin's lymphoma (CMS/HCC)      Past Surgical History:  Past Surgical History:   Procedure Laterality Date   • CARDIAC CATHETERIZATION     • PARATHYROID GLAND SURGERY     • PROSTATE SURGERY      PROSTATECTOMY   • SKIN CANCER EXCISION       Social History:  reports that he has never smoked. He has never used smokeless tobacco. He reports that he does not drink alcohol or use drugs.    Family History: family history includes Cancer in his father; Heart disease in his brother and mother.       Allergies:  No Known  Allergies  Medications:  Prior to Admission medications    Medication Sig Start Date End Date Taking? Authorizing Provider   allopurinol (ZYLOPRIM) 300 MG tablet Take 300 mg by mouth Daily.   Yes Reyes Stewart MD   Empagliflozin (JARDIANCE) 10 MG tablet Take 10 mg by mouth Daily.   Yes Reyes Stewart MD   apixaban (ELIQUIS) 5 MG tablet tablet Take 1 tablet by mouth Every 12 (Twelve) Hours. 8/8/19   Kateryna Draper APRN   B Complex Vitamins (VITAMIN B COMPLEX) capsule capsule Take 1 capsule by mouth Daily.    Reyes Stewart MD   digoxin (LANOXIN) 125 MCG tablet Take 1 tablet by mouth Daily. 8/8/19   Kateryna Draper APRN   finasteride (PROSCAR) 5 MG tablet Take 5 mg by mouth Daily.    Reyes Stewart MD   furosemide (LASIX) 20 MG tablet Take 1 tablet by mouth Daily.  Patient taking differently: Take 5 mg by mouth Daily. 6/11/19   Chris Meyer MD   HYDROcodone-acetaminophen (NORCO) 7.5-325 MG per tablet Take 1 tablet by mouth Every 6 (Six) Hours As Needed for Moderate Pain .    Reyes Stewart MD   Lidocaine-Prilocaine, Bulk, 2.5-2.5 % cream Apply 1 application topically to the appropriate area as directed As Needed (APPLY TO AREA OF PORT 30 MIN- 1 HR PRIOR TO PORT ACCESS).    Reyes Stewart MD   loratadine (CLARITIN) 10 MG tablet Take 10 mg by mouth Daily.    Reyes Stewart MD   megestrol (MEGACE) 40 MG/ML suspension Take 400 mg by mouth Daily.    Reyes Stewart MD   melatonin 5 MG tablet tablet Take 5 mg by mouth Every Night.    Reyes Stewart MD   metFORMIN (GLUCOPHAGE) 1000 MG tablet Take 1,000 mg by mouth 2 (Two) Times a Day With Meals.    Reyes Stewart MD   metoprolol tartrate (LOPRESSOR) 25 MG tablet Take 0.5 tablets by mouth Every 12 (Twelve) Hours. 5/20/19   Stephane Ruvalcaba DO   pantoprazole (PROTONIX) 40 MG EC tablet Take 40 mg by mouth 2 (Two) Times a Day.    Reyes Stewart MD   potassium chloride (K-DUR,KLOR-CON)  "20 MEQ CR tablet Take 1 tablet by mouth Daily. 7/17/19   Chris Meyer MD   traZODone (DESYREL) 50 MG tablet Take 50 mg by mouth Every Night.    Provider, MD Reyes     Objective     Vital Signs: /62 (BP Location: Right arm, Patient Position: Lying)   Pulse 111   Temp 97.8 °F (36.6 °C)   Resp 18   Ht 188 cm (74\")   Wt 66.2 kg (146 lb)   SpO2 96%   BMI 18.75 kg/m²   Physical Exam   HENT:   Head: Normocephalic and atraumatic.   Nose: Nose normal.   Mouth/Throat: Oropharynx is clear and moist.   Patient is hard of hearing   Eyes: Conjunctivae and EOM are normal.   Neck: Normal range of motion. Neck supple.   Cardiovascular: Normal rate, regular rhythm and normal heart sounds.   Pulmonary/Chest: Effort normal and breath sounds normal.   Abdominal: Soft. Bowel sounds are normal.   Musculoskeletal: He exhibits no edema or tenderness.   Neurological: He is alert. No cranial nerve deficit.   Patient has no focal deficits   Skin: Skin is warm and dry.   Psychiatric: He has a normal mood and affect.   Vitals reviewed.          Results Reviewed:  Lab Results (last 24 hours)     Procedure Component Value Units Date/Time    [place] Draw [[id number]] Collected:  11/09/19 2151    Specimen:  Blood Updated:  11/09/19 2300    Narrative:       The following orders were created for panel order [place] Draw.  Procedure                               Abnormality         Status                     ---------                               -----------         ------                     Light Blue Top[[id number]]                                   Final result               Green Top (Gel)[[id number]]                                  Final result               Lavender Top[[id number]]                                     Final result               Red Top[[id number]]                                          In process                   Please view results for these tests on the individual orders.    Light Blue Top " [[id number]] Collected:  11/09/19 2151    Specimen:  Blood Updated:  11/09/19 2300     Extra Tube hold for add-on     Comment: Auto resulted       Green Top (Gel) [[id number]] Collected:  11/09/19 2151    Specimen:  Blood Updated:  11/09/19 2300     Extra Tube Hold for add-ons.     Comment: Auto resulted.       Lavender Top [[id number]] Collected:  11/09/19 2151    Specimen:  Blood Updated:  11/09/19 2300     Extra Tube hold for add-on     Comment: Auto resulted       Urinalysis, Microscopic Only - Urine, Clean Catch [[id number]]  (Abnormal) Collected:  11/09/19 2221    Specimen:  Urine, Clean Catch Updated:  11/09/19 2249     RBC, UA 6-12 /HPF      WBC, UA None Seen /HPF      Bacteria, UA None Seen /HPF      Squamous Epithelial Cells, UA None Seen /HPF      Hyaline Casts, UA None Seen /LPF      Methodology Automated Microscopy    Urinalysis With Microscopic If Indicated (No Culture) - Urine, Clean Catch [[id number]]  (Abnormal) Collected:  11/09/19 2221    Specimen:  Urine, Clean Catch Updated:  11/09/19 2249     Color, UA Yellow     Appearance, UA Clear     pH, UA 5.5     Specific Gravity, UA 1.010     Glucose, UA >=1000 mg/dL (3+)     Ketones, UA Negative     Bilirubin, UA Negative     Blood, UA Small (1+)     Protein, UA Negative     Leuk Esterase, UA Negative     Nitrite, UA Negative     Urobilinogen, UA 0.2 E.U./dL    Digoxin Level [[id number]]  (Normal) Collected:  11/09/19 2151    Specimen:  Blood Updated:  11/09/19 2241     Digoxin 0.90 ng/mL     Ammonia [[id number]]  (Normal) Collected:  11/09/19 2212    Specimen:  Blood Updated:  11/09/19 2236     Ammonia 24 umol/L     Comprehensive Metabolic Panel [[id number]]  (Abnormal) Collected:  11/09/19 2151    Specimen:  Blood Updated:  11/09/19 2223     Glucose 230 mg/dL      BUN 19 mg/dL      Creatinine 0.77 mg/dL      Sodium 132 mmol/L      Potassium 4.4 mmol/L      Chloride 96 mmol/L      CO2 21.0 mmol/L      Calcium 9.4 mg/dL      Total Protein 6.0 g/dL      Albumin  3.60 g/dL      ALT (SGPT) 34 U/L      AST (SGOT) 28 U/L      Alkaline Phosphatase 78 U/L      Total Bilirubin 0.5 mg/dL      eGFR Non African Amer 97 mL/min/1.73      Globulin 2.4 gm/dL      A/G Ratio 1.5 g/dL      BUN/Creatinine Ratio 24.7     Anion Gap 15.0 mmol/L     Narrative:       GFR Normal >60  Chronic Kidney Disease <60  Kidney Failure <15    CBC & Differential [[id number]] Collected:  11/09/19 2151    Specimen:  Blood Updated:  11/09/19 2210    Narrative:       The following orders were created for panel order CBC & Differential.  Procedure                               Abnormality         Status                     ---------                               -----------         ------                     CBC Auto Differential[[id number]]        Abnormal            Final result                 Please view results for these tests on the individual orders.    CBC Auto Differential [[id number]]  (Abnormal) Collected:  11/09/19 2151    Specimen:  Blood Updated:  11/09/19 2210     WBC 6.88 10*3/mm3      RBC 2.88 10*6/mm3      Hemoglobin 9.7 g/dL      Hematocrit 28.6 %      MCV 99.3 fL      MCH 33.7 pg      MCHC 33.9 g/dL      RDW 16.4 %      RDW-SD 57.1 fl      MPV 10.7 fL      Platelets 124 10*3/mm3      Neutrophil % 66.7 %      Lymphocyte % 13.7 %      Monocyte % 11.5 %      Eosinophil % 6.7 %      Basophil % 0.4 %      Immature Grans % 1.0 %      Neutrophils, Absolute 4.59 10*3/mm3      Lymphocytes, Absolute 0.94 10*3/mm3      Monocytes, Absolute 0.79 10*3/mm3      Eosinophils, Absolute 0.46 10*3/mm3      Basophils, Absolute 0.03 10*3/mm3      Immature Grans, Absolute 0.07 10*3/mm3      nRBC 0.6 /100 WBC     Red Top [[id number]] Collected:  11/09/19 2151    Specimen:  Blood Updated:  11/09/19 2156        Imaging Results (Last 24 Hours)     Procedure Component Value Units Date/Time    CT Head Without Contrast [[id number]] Resulted:  11/09/19 2340     Updated:  11/09/19 2341    XR Chest 1 View [[id number]] Updated:   11/09/19 9918        I have personally reviewed and interpreted the radiology studies and ECG obtained at time of admission.     Assessment / Plan     Assessment:   Active Hospital Problems    Diagnosis   • Transient ischemic attack     Impression:  1.  Altered mental status with confusion  2.  B-cell lymphoma trying new chemotherapy regimen  3.  Mild hyponatremia  4.  Mild microcytic anemia  5.  Thrombocytopenia  6.  Diabetes mellitus type 2, chemotherapy-induced  7.  A. fib, currently on Eliquis    Plan:   1.  MRI of the brain with and without contrast in the morning  2.  Consult neurology  3.  PT OT consult  4.  Resume home medications  5.  Labs in the morning  6.  Hold Glucophage, and start sliding scale insulin with Accu-Cheks  7.  Neurochecks    Code Status: Full     I discussed the patient's findings and my recommendations with the patient and family at bedside.  The patient's daughter is a pharmacist at the hospital    Estimated length of stay 2 to 3 days    Nasir Cordon DO   11/10/19   1:50 AM

## 2019-11-10 NOTE — ED PROVIDER NOTES
Subjective   This patient is a 79 with a history of now recurrent B-cell lymphoma.  Over the last day or so he is demonstrated increased confusion.  The patient states that he has found real difficulty finding his words and is generally just not feeling right.  He denies fever or chills, new cough or pain of any kind.  He does say that he has the very minimal dysuria.  Had no nausea or vomiting.            Review of Systems   Neurological: Positive for speech difficulty.   Psychiatric/Behavioral: Positive for confusion.   All other systems reviewed and are negative.      Past Medical History:   Diagnosis Date   • A-fib (CMS/HCC)    • Arthritis    • Cancer (CMS/HCC)     skin   • Diabetes mellitus (CMS/HCC)     borderline, no medication   • Disease of thyroid gland    • Dysrhythmia    • HL (hearing loss)    • Hyperlipidemia    • Hypertension    • IBS (irritable bowel syndrome)    • Lung mass 12/24/2018   • Neuropathy    • Non Hodgkin's lymphoma (CMS/HCC)        No Known Allergies    Past Surgical History:   Procedure Laterality Date   • CARDIAC CATHETERIZATION     • PARATHYROID GLAND SURGERY     • PROSTATE SURGERY      PROSTATECTOMY   • SKIN CANCER EXCISION         Family History   Problem Relation Age of Onset   • Heart disease Mother    • Cancer Father    • Heart disease Brother        Social History     Socioeconomic History   • Marital status:      Spouse name: Not on file   • Number of children: Not on file   • Years of education: Not on file   • Highest education level: Not on file   Tobacco Use   • Smoking status: Never Smoker   • Smokeless tobacco: Never Used   Substance and Sexual Activity   • Alcohol use: No   • Drug use: No   • Sexual activity: Defer           Objective   Physical Exam   Constitutional: He is oriented to person, place, and time. He appears well-developed and well-nourished.   HENT:   Head: Normocephalic and atraumatic.   Right Ear: External ear normal.   Left Ear: External ear  normal.   Nose: Nose normal.   Mouth/Throat: Oropharynx is clear and moist.   Eyes: Conjunctivae and EOM are normal.   Neck: Normal range of motion. Neck supple.   Cardiovascular: Normal rate, regular rhythm, normal heart sounds and intact distal pulses.   Pulmonary/Chest: Effort normal and breath sounds normal.   Abdominal: Soft. Bowel sounds are normal.   Musculoskeletal: Normal range of motion.   Neurological: He is alert and oriented to person, place, and time. He has normal strength. No cranial nerve deficit or sensory deficit. GCS eye subscore is 4. GCS verbal subscore is 5. GCS motor subscore is 6.   The patient was able to spell world forwards but not backwards.  Was able to subtract 3 from 100 but then was unable to subtract any further.  He was only able to name 1 out of 3 things immediately and on after 5 minutes.  He thought that Sarath was the president now    He has no localizing neurologic symptoms otherwise   Skin: Skin is warm and dry. Capillary refill takes less than 2 seconds.   Psychiatric: He has a normal mood and affect. His behavior is normal. Judgment and thought content normal.   Nursing note and vitals reviewed.      Procedures           ED Course  ED Course as of Nov 10 0124   Sun Nov 10, 2019   0123 Discussed with Dr. Molina work-up to this point who is agreeable to admission to the hospital.  Discussed with patient's family needs who is not comfortable taking patient home and are agreeable to admission.  [RW]      ED Course User Index  [RW] Trey Del Cid MD                  [facility]    Final diagnoses:   Transient ischemic attack     The patient has increasing confusion.  I handed his care over to Dr. Del Cid, the patient is currently stable         Trey Del Cid MD  11/10/19 0124

## 2019-11-10 NOTE — PLAN OF CARE
Problem: Patient Care Overview  Goal: Plan of Care Review  Outcome: Ongoing (interventions implemented as appropriate)   11/10/19 0612   Coping/Psychosocial   Plan of Care Reviewed With patient   Plan of Care Review   Progress no change   OTHER   Outcome Summary NIH 0, patient and family state he was having trouble finding words yesterday afternoon, has been recieving chemo and radiation for lymphoma and has become very weak, wife states he falls often, very unsteady standing patient at bedside, bed alarm set safety maintained, neurology consulted, MRI planned for this am, patient Afib on tele, hx afib, on eloquis, storke education booklet at bedside       Problem: Fall Risk (Adult)  Goal: Identify Related Risk Factors and Signs and Symptoms  Outcome: Ongoing (interventions implemented as appropriate)    Goal: Absence of Fall  Outcome: Ongoing (interventions implemented as appropriate)      Problem: Stroke (Ischemic) (Adult)  Goal: Signs and Symptoms of Listed Potential Problems Will be Absent, Minimized or Managed (Stroke)  Outcome: Ongoing (interventions implemented as appropriate)

## 2019-11-10 NOTE — CONSULTS
Neurology Consult Note    Referring Provider: Dr. Brigido Angel  Reason for Consultation: stroke      History of present illness:      This is a 79-year-old male with a diagnosis earlier this year of a diffuse B-cell lymphoma.  He is currently undergoing radiation and chemotherapy for this.  He recently initiated a new chemotherapy within the last 6 weeks called Polivy.  According to records this chemotherapy is associated with a neuropathy but does not list confusion or an aphasia as common side effects.  His family is at the bedside and assist in the history.  They reported that yesterday he had an acute onset of difficulties with finding words.  He had no other neurologic symptoms such as visual changes, facial drooping, unilateral weakness, nor unilateral numbness.  He presented to our ER.  He did not receive intravenous TPA given his low NIH stroke scale of 1 in addition to the fact that he had a history of cancer and would be an increased bleed risk.  He was admitted overnight his symptoms resolved.  He is back to his baseline now.  He does admit to a mild headache yesterday.  He does not have this currently.  He has no current neurologic deficits.  He is extremely interested in going home.      Past Medical History:   Diagnosis Date   • A-fib (CMS/HCC)    • Arthritis    • Cancer (CMS/HCC)     skin   • Diabetes mellitus (CMS/HCC)     borderline, no medication   • Disease of thyroid gland    • Dysrhythmia    • HL (hearing loss)    • Hyperlipidemia    • Hypertension    • IBS (irritable bowel syndrome)    • Lung mass 12/24/2018   • Neuropathy    • Non Hodgkin's lymphoma (CMS/HCC)        No Known Allergies  No current facility-administered medications on file prior to encounter.      Current Outpatient Medications on File Prior to Encounter   Medication Sig   • allopurinol (ZYLOPRIM) 300 MG tablet Take 300 mg by mouth Daily.   • Empagliflozin (JARDIANCE) 10 MG tablet Take 10 mg by mouth Daily.   • apixaban  (ELIQUIS) 5 MG tablet tablet Take 1 tablet by mouth Every 12 (Twelve) Hours.   • B Complex Vitamins (VITAMIN B COMPLEX) capsule capsule Take 1 capsule by mouth Daily.   • digoxin (LANOXIN) 125 MCG tablet Take 1 tablet by mouth Daily.   • finasteride (PROSCAR) 5 MG tablet Take 5 mg by mouth Daily.   • furosemide (LASIX) 20 MG tablet Take 1 tablet by mouth Daily. (Patient taking differently: Take 5 mg by mouth Daily.)   • HYDROcodone-acetaminophen (NORCO) 7.5-325 MG per tablet Take 1 tablet by mouth Every 6 (Six) Hours As Needed for Moderate Pain .   • Lidocaine-Prilocaine, Bulk, 2.5-2.5 % cream Apply 1 application topically to the appropriate area as directed As Needed (APPLY TO AREA OF PORT 30 MIN- 1 HR PRIOR TO PORT ACCESS).   • loratadine (CLARITIN) 10 MG tablet Take 10 mg by mouth Daily.   • megestrol (MEGACE) 40 MG/ML suspension Take 400 mg by mouth Daily.   • melatonin 5 MG tablet tablet Take 5 mg by mouth Every Night.   • metFORMIN (GLUCOPHAGE) 1000 MG tablet Take 1,000 mg by mouth 2 (Two) Times a Day With Meals.   • metoprolol tartrate (LOPRESSOR) 25 MG tablet Take 0.5 tablets by mouth Every 12 (Twelve) Hours.   • pantoprazole (PROTONIX) 40 MG EC tablet Take 40 mg by mouth 2 (Two) Times a Day.   • potassium chloride (K-DUR,KLOR-CON) 20 MEQ CR tablet Take 1 tablet by mouth Daily.   • traZODone (DESYREL) 50 MG tablet Take 50 mg by mouth Every Night.       Social History     Socioeconomic History   • Marital status:      Spouse name: Not on file   • Number of children: Not on file   • Years of education: Not on file   • Highest education level: Not on file   Tobacco Use   • Smoking status: Never Smoker   • Smokeless tobacco: Never Used   Substance and Sexual Activity   • Alcohol use: No   • Drug use: No   • Sexual activity: Defer     Family History   Problem Relation Age of Onset   • Heart disease Mother    • Cancer Father    • Heart disease Brother        Review of Systems  A 14 point review of systems  was reviewed and was negative except for speech changes    Vital Signs   Temp:  [97.8 °F (36.6 °C)-98.9 °F (37.2 °C)] 98.9 °F (37.2 °C)  Heart Rate:  [] 101  Resp:  [16-18] 16  BP: (100-141)/(54-96) 100/67    General Exam:  Head:  Normal cephalic, atraumatic  HEENT:  Neck supple  Fundoscopic Exam:  No signs of disc edema  CVS:  Regular rate and rhythm.  No murmurs  Carotid Examination:  No bruits  Lungs:  Clear to auscultation  Abdomen:  Non-tender, Non-distended  Extremities:  No signs of peripheral edema  Skin:  No rashes    Neurologic Exam:    Mental Status:    -Awake, Alert, Oriented X 3  -No word finding difficulties  -No aphasia  -No dysarthria  -Follows simple and complex commands    CN II:  Visual fields full.  Pupils equally reactive to light  CN III, IV, VI:  Extraocular Muscles full with no signs of nystagmus  CN V:  Facial sensory is symmetric with no asymmetries.  CN VII:  Facial motor symmetric  CN VIII:  Gross hearing intact bilaterally  CN IX:  Palate elevates symmetrically  CN X:  Palate elevates symmetrically  CN XI:  Shoulder shrug symmetric  CN XII:  Tongue is midline on protrusion    Motor: (strength out of 5:  1= minimal movement, 2 = movement in plane of gravity, 3 = movement against gravity, 4 = movement against some resistance, 5 = full strength)    -Right Upper Ext: Proximal: 5 Distal: 5  -Left Upper Ext: Proximal: 5 Distal: 5    -Right Lower Ext: Proximal: 5 Distal: 5  -Left Lower Ext: Proximal: 5 Distal: 5    DTR:  -Right   Bicep: 2+ Tricep: 2+ Brachoradialis: 2+   Patella: 2+ Ankle: 2+ Neg Babinski  -Left   Bicep: 2+ Tricep: 2+ Brachoradialis: 2+   Patella: 2+ Ankle: 2+ Neg Babinski    Sensory:  -Intact to light touch, pinprick, temperature, pain, and proprioception    Coordination:  -Finger to nose intact  -Heel to shin intact  -No ataxia    Gait  -No signs of ataxia  -ambulates unassisted      Results Review:  Lab Results (last 24 hours)     Procedure Component Value Units  Date/Time    Troponin [[id number]] Collected:  11/10/19 1310    Specimen:  Blood Updated:  11/10/19 1319    POC Glucose Once [[id number]]  (Abnormal) Collected:  11/10/19 1253    Specimen:  Blood Updated:  11/10/19 1304     Glucose 185 mg/dL      Comment: : [id number] Lesly Givens ID: [id number]       POC Glucose Once [[id number]]  (Abnormal) Collected:  11/10/19 0851    Specimen:  Blood Updated:  11/10/19 0903     Glucose 159 mg/dL      Comment: : [id number] Cristal Barr ID: [id number]       Troponin [[id number]]  (Abnormal) Collected:  11/10/19 0708    Specimen:  Blood Updated:  11/10/19 0746     Troponin T 0.073 ng/mL     Narrative:       Troponin T Reference Range:  <= 0.03 ng/mL-   Negative for AMI  >0.03 ng/mL-     Abnormal for myocardial necrosis.  Clinicians would have to utilize clinical acumen, EKG, Troponin and serial changes to determine if it is an Acute Myocardial Infarction or myocardial injury due to an underlying chronic condition.     CBC Auto Differential [[id number]]  (Abnormal) Collected:  11/10/19 0437    Specimen:  Blood Updated:  11/10/19 0644     WBC 5.99 10*3/mm3      RBC 2.68 10*6/mm3      Hemoglobin 9.0 g/dL      Hematocrit 26.6 %      MCV 99.3 fL      MCH 33.6 pg      MCHC 33.8 g/dL      RDW 16.3 %      RDW-SD 56.8 fl      MPV 11.7 fL      Platelets 127 10*3/mm3     Manual Differential [[id number]]  (Abnormal) Collected:  11/10/19 0437    Specimen:  Blood Updated:  11/10/19 0644     Neutrophil % 62.2 %      Lymphocyte % 11.2 %      Monocyte % 3.1 %      Eosinophil % 7.1 %      Basophil % 4.1 %      Bands %  12.2 %      Neutrophils Absolute 4.46 10*3/mm3      Lymphocytes Absolute 0.67 10*3/mm3      Monocytes Absolute 0.19 10*3/mm3      Eosinophils Absolute 0.43 10*3/mm3      Basophils Absolute 0.25 10*3/mm3      Anisocytosis Slight/1+     Polychromasia Slight/1+     WBC Morphology Normal     Platelet Morphology Normal    Troponin [[id number]]  (Abnormal) Collected:   11/10/19 0437    Specimen:  Blood Updated:  11/10/19 0640     Troponin T 0.071 ng/mL     Narrative:       Troponin T Reference Range:  <= 0.03 ng/mL-   Negative for AMI  >0.03 ng/mL-     Abnormal for myocardial necrosis.  Clinicians would have to utilize clinical acumen, EKG, Troponin and serial changes to determine if it is an Acute Myocardial Infarction or myocardial injury due to an underlying chronic condition.     Comprehensive Metabolic Panel [[id number]]  (Abnormal) Collected:  11/10/19 0437    Specimen:  Blood Updated:  11/10/19 0636     Glucose 154 mg/dL      BUN 16 mg/dL      Creatinine 0.74 mg/dL      Sodium 134 mmol/L      Potassium 3.8 mmol/L      Chloride 101 mmol/L      CO2 21.0 mmol/L      Calcium 8.6 mg/dL      Total Protein 5.5 g/dL      Albumin 3.50 g/dL      ALT (SGPT) 30 U/L      AST (SGOT) 23 U/L      Alkaline Phosphatase 95 U/L      Total Bilirubin 0.6 mg/dL      eGFR Non African Amer 102 mL/min/1.73      Globulin 2.0 gm/dL      A/G Ratio 1.8 g/dL      BUN/Creatinine Ratio 21.6     Anion Gap 12.0 mmol/L     Narrative:       GFR Normal >60  Chronic Kidney Disease <60  Kidney Failure <15    Lipid Panel [[id number]]  (Abnormal) Collected:  11/10/19 0437    Specimen:  Blood Updated:  11/10/19 0636     Total Cholesterol 131 mg/dL      Triglycerides 422 mg/dL      HDL Cholesterol 18 mg/dL      LDL Cholesterol  --     Comment: Unable to calculate        VLDL Cholesterol --     Comment: Unable to calculate        LDL/HDL Ratio --     Comment: Unable to calculate       Narrative:       Cholesterol Reference Ranges  (U.S. Department of Health and Human Services ATP III Classifications)    Desirable          <200 mg/dL  Borderline High    200-239 mg/dL  High Risk          >240 mg/dL      Triglyceride Reference Ranges  (U.S. Department of Health and Human Services ATP III Classifications)    Normal           <150 mg/dL  Borderline High  150-199 mg/dL  High             200-499 mg/dL  Very High        >500  mg/dL    HDL Reference Ranges  (U.S. Department of Health and Human Services ATP III Classifcations)    Low     <40 mg/dl (major risk factor for CHD)  High    >60 mg/dl ('negative' risk factor for CHD)        LDL Reference Ranges  (U.S. Department of Health and Human Services ATP III Classifcations)    Optimal          <100 mg/dL  Near Optimal     100-129 mg/dL  Borderline High  130-159 mg/dL  High             160-189 mg/dL  Very High        >189 mg/dL    Magnesium [[id number]]  (Abnormal) Collected:  11/10/19 0437    Specimen:  Blood Updated:  11/10/19 0636     Magnesium 1.5 mg/dL     Phosphorus [[id number]]  (Abnormal) Collected:  11/10/19 0437    Specimen:  Blood Updated:  11/10/19 0636     Phosphorus 2.2 mg/dL     TSH [[id number]]  (Normal) Collected:  11/10/19 0437    Specimen:  Blood Updated:  11/10/19 0636     TSH 1.250 uIU/mL     LDL Cholesterol, Direct [[id number]] Collected:  11/10/19 0437    Specimen:  Blood Updated:  11/10/19 0636    POC Glucose Once [[id number]]  (Abnormal) Collected:  11/10/19 0244    Specimen:  Blood Updated:  11/10/19 0304     Glucose 147 mg/dL      Comment: : [id number] Nigel Rolle (Drury) AnnMeter ID: [id number]       [place] Draw [[id number]] Collected:  11/09/19 2151    Specimen:  Blood Updated:  11/09/19 2300    Narrative:       The following orders were created for panel order [place] Draw.  Procedure                               Abnormality         Status                     ---------                               -----------         ------                     Light Blue Top[[id number]]                                   Final result               Green Top (Gel)[[id number]]                                  Final result               Lavender Top[[id number]]                                     Final result               Red Top[[id number]]                                          In process                   Please view results for these tests on the individual orders.    Light Blue  Top [[id number]] Collected:  11/09/19 2151    Specimen:  Blood Updated:  11/09/19 2300     Extra Tube hold for add-on     Comment: Auto resulted       Green Top (Gel) [[id number]] Collected:  11/09/19 2151    Specimen:  Blood Updated:  11/09/19 2300     Extra Tube Hold for add-ons.     Comment: Auto resulted.       Lavender Top [[id number]] Collected:  11/09/19 2151    Specimen:  Blood Updated:  11/09/19 2300     Extra Tube hold for add-on     Comment: Auto resulted       Urinalysis, Microscopic Only - Urine, Clean Catch [[id number]]  (Abnormal) Collected:  11/09/19 2221    Specimen:  Urine, Clean Catch Updated:  11/09/19 2249     RBC, UA 6-12 /HPF      WBC, UA None Seen /HPF      Bacteria, UA None Seen /HPF      Squamous Epithelial Cells, UA None Seen /HPF      Hyaline Casts, UA None Seen /LPF      Methodology Automated Microscopy    Urinalysis With Microscopic If Indicated (No Culture) - Urine, Clean Catch [[id number]]  (Abnormal) Collected:  11/09/19 2221    Specimen:  Urine, Clean Catch Updated:  11/09/19 2249     Color, UA Yellow     Appearance, UA Clear     pH, UA 5.5     Specific Gravity, UA 1.010     Glucose, UA >=1000 mg/dL (3+)     Ketones, UA Negative     Bilirubin, UA Negative     Blood, UA Small (1+)     Protein, UA Negative     Leuk Esterase, UA Negative     Nitrite, UA Negative     Urobilinogen, UA 0.2 E.U./dL    Digoxin Level [[id number]]  (Normal) Collected:  11/09/19 2151    Specimen:  Blood Updated:  11/09/19 2241     Digoxin 0.90 ng/mL     Ammonia [[id number]]  (Normal) Collected:  11/09/19 2212    Specimen:  Blood Updated:  11/09/19 2236     Ammonia 24 umol/L     Comprehensive Metabolic Panel [[id number]]  (Abnormal) Collected:  11/09/19 2151    Specimen:  Blood Updated:  11/09/19 2223     Glucose 230 mg/dL      BUN 19 mg/dL      Creatinine 0.77 mg/dL      Sodium 132 mmol/L      Potassium 4.4 mmol/L      Chloride 96 mmol/L      CO2 21.0 mmol/L      Calcium 9.4 mg/dL      Total Protein 6.0 g/dL       Albumin 3.60 g/dL      ALT (SGPT) 34 U/L      AST (SGOT) 28 U/L      Alkaline Phosphatase 78 U/L      Total Bilirubin 0.5 mg/dL      eGFR Non African Amer 97 mL/min/1.73      Globulin 2.4 gm/dL      A/G Ratio 1.5 g/dL      BUN/Creatinine Ratio 24.7     Anion Gap 15.0 mmol/L     Narrative:       GFR Normal >60  Chronic Kidney Disease <60  Kidney Failure <15    CBC & Differential [[id number]] Collected:  11/09/19 2151    Specimen:  Blood Updated:  11/09/19 2210    Narrative:       The following orders were created for panel order CBC & Differential.  Procedure                               Abnormality         Status                     ---------                               -----------         ------                     CBC Auto Differential[[id number]]        Abnormal            Final result                 Please view results for these tests on the individual orders.    CBC Auto Differential [[id number]]  (Abnormal) Collected:  11/09/19 2151    Specimen:  Blood Updated:  11/09/19 2210     WBC 6.88 10*3/mm3      RBC 2.88 10*6/mm3      Hemoglobin 9.7 g/dL      Hematocrit 28.6 %      MCV 99.3 fL      MCH 33.7 pg      MCHC 33.9 g/dL      RDW 16.4 %      RDW-SD 57.1 fl      MPV 10.7 fL      Platelets 124 10*3/mm3      Neutrophil % 66.7 %      Lymphocyte % 13.7 %      Monocyte % 11.5 %      Eosinophil % 6.7 %      Basophil % 0.4 %      Immature Grans % 1.0 %      Neutrophils, Absolute 4.59 10*3/mm3      Lymphocytes, Absolute 0.94 10*3/mm3      Monocytes, Absolute 0.79 10*3/mm3      Eosinophils, Absolute 0.46 10*3/mm3      Basophils, Absolute 0.03 10*3/mm3      Immature Grans, Absolute 0.07 10*3/mm3      nRBC 0.6 /100 WBC     Red Top [[id number]] Collected:  11/09/19 2151    Specimen:  Blood Updated:  11/09/19 2156          .  Imaging Results (Last 24 Hours)     Procedure Component Value Units Date/Time    SCANNED - IMAGING [[id number]] Resulted:  11/09/19      Updated:  11/10/19 1058    MRI Brain With & Without Contrast  [[id number]] Updated:  11/10/19 1037    US Carotid Bilateral [[id number]] Updated:  11/10/19 0921    XR Chest 1 View [[id number]] Collected:  11/10/19 0752     Updated:  11/10/19 0757    Narrative:       EXAMINATION: Chest 1 view 11/09/2019     HISTORY: Confusion     FINDINGS: Upright frontal projection of the chest is compared to prior  exam of 07/16/2019. 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 There is a focus of nodular consolidation projecting  over the periphery of the left upper lobe. On CT examination of  September of this year there was a developing groundglass opacity in  this same distribution. At that time it was suspected to perhaps be  related to post radiation change. It does show progression from the  previous CT. The lungs are otherwise clear. There is no effusion or free  air present. A tunneled infusion catheter is in place via left-sided  approach.       Impression:       1.. Nodular consolidation in the periphery of the left upper lobe may  represent post treatment changes. There has been marked interval  decrease in size of the pleural-based mass within the periphery of the  left upper lobe.  2. Lungs are otherwise clear.  This report was finalized on 11/10/2019 07:54 by Dr. Octavio Ochoa MD.    CT Head Without Contrast [[id number]] Collected:  11/10/19 0735     Updated:  11/10/19 0740    Narrative:       CT BRAIN without contrast 11/9/2019 10:33 PM CST     HISTORY: Neoplasm of brain suspected.     COMPARISON: None      DLP: 586 mGy cm. Automated exposure control was utilized to diminish  patient radiation dose.     TECHNIQUE: Serial axial tomographic images of the brain were obtained  without the use of intravenous contrast.      FINDINGS:   The midline structures are nondisplaced. There is mild cerebral and  cerebellar volume loss, with an associated increase in the prominence of  the ventricles  and sulci. The basilar cisterns are normal in size and  configuration. There is no evidence of intracranial hemorrhage or  mass-effect. There is low attenuation in the periventricular white  matter, consistent with chronic ischemic change. There are no abnormal  extra-axial fluid collections. There is no evidence of tonsillar  herniation.      The included orbits and their contents are unremarkable. The visualized  paranasal sinuses, mastoid air cells and middle ear cavities are clear.  The visualized osseous structures and overlying soft tissues of the  skull and face are intact.        Impression:       Mild cerebral and cerebellar volume loss with chronic microvascular  disease but no evidence of acute intracranial process.        This report was finalized on 11/10/2019 07:37 by Dr. Octavio Ochoa MD.        MRI of the brain with and without contrast show no signs of diffusion abnormalities.  Flair sequencing shows some mild periventricular white matter disease that is unremarkable.  Contrasted sequencing shows no signs of any contrast enhancement to suggest a tumor burden.    Carotid ultrasound shows no plaque burden with moderate stenoses bilaterally.      Impression    79-year-old male with atypical episode of what sounds to be an aphasia.  The etiology behind this is unclear.  It may have been secondary to the chemotherapy; however, it is not listed as 1 of the common side effects.  It may be a transient ischemic attack as well; however, I find minimal stroke risk factors for this with exception of the fact that he may be somewhat hypercoagulable from his underlying malignancy.  He does have atrial fibrillation but is on anticoagulation therapy currently.  He describes compliance with this.  His carotid ultrasound is unremarkable.  Cardiac echo is currently pending.  He has no current therapy needs.  I think it is reasonable for him to be discharged home today.  He continues having spells like this we may  need to proceed with a further work-up including EEG.  In the meantime I hesitate to add an antiplatelet to his regimen given he is already on anticoagulation has low platelets likely secondary to chemotherapy and malignancy.    Plan    · Will follow official results of MRI brain with and without contrast although I see no signs of diffusion restrictions nor tumor burden  · Follow-up official results of cardiac echo  · Clear from neurology standpoint to be discharged home today.  I provided them with my card in my office number.  If the spells were to recur they are to call immediately.  If they were to recur we likely would consider doing an EEG.    I discussed the patients findings and my recommendations with patient and family    Ulises Youssef MD  11/10/19  1:25 PM

## 2019-11-11 ENCOUNTER — READMISSION MANAGEMENT (OUTPATIENT)
Dept: CALL CENTER | Facility: HOSPITAL | Age: 79
End: 2019-11-11

## 2019-11-11 LAB — ARTICHOKE IGE QN: 47 MG/DL (ref 0–100)

## 2019-11-11 NOTE — DISCHARGE PLACEMENT REQUEST
"Aram Nunez (79 y.o. Male)     Date of Birth Social Security Number Address Home Phone MRN    [id number]  [id number] [facility] [address] [phone] [id number]    Taoism Marital Status          Jainism        Admission Date Admission Type Admitting Provider Attending Provider Department, Room/Bed    11/9/19 Emergency Reagan Rivera MD  [facility] 3A, 342/1    Discharge Date Discharge Disposition Discharge Destination        11/10/2019 Home-Health Care [facility]              Attending Provider:  (none)   Allergies:  No Known Allergies    Isolation:  None   Infection:  None   Code Status:  Prior    Ht:  188 cm (74.02\")   Wt:  71.6 kg (157 lb 13.6 oz)    Admission Cmt:  None   Principal Problem:  None                Active Insurance as of 11/9/2019     Primary Coverage     Payor Plan Insurance Group Employer/Plan Group    MEDICARE MEDICARE A & B      Payor Plan Address Payor Plan Phone Number Payor Plan Fax Number Effective Dates    [address] [phone]  1/1/2005 - None Entered    [facility]ly [facility] [id number]       Subscriber Name Subscriber Birth Date Member ID       ARAM NUNEZ 1940 [id number]           Secondary Coverage     Payor Plan Insurance Group Employer/Plan Group    FARM BUREAU HEALTH PLANS  SUPP [employer] PLANS  SUPP [id number]     Payor Plan Address Payor Plan Phone Number Payor Plan Fax Number Effective Dates    [address] [address] [phone]  1/1/2017 - None Entered    [facility] [id number]       Subscriber Name Subscriber Birth Date Member ID       ARAM NUNEZ 1940 [id number]                 Emergency Contacts      (Rel.) Home Phone Work Phone Mobile Phone    EnriqueGina (Spouse) [phone] -- [phone]    ALE KEARNEY (Daughter) -- [phone]    BIJAL NUNEZ (Son) -- -- [phone]    ENRIQUEARINA (Son) -- -- [phone]               History & Physical      Nasir Cordon, DO at " 11/10/19 0150              [facility]  HISTORY AND PHYSICAL    Date of Admission: 11/9/2019  Primary Care Physician: Woody Mathis DO    Subjective     Chief Complaint: Confusion    History of Present Illness  79-year-old  male admitted to the emergency department with past medical history of B-cell lymphoma.  He is currently getting a new chemotherapy Polivy.  The patient started having difficulty with word finding, and not making sense at home.  The family states that he was not himself.  Started about 230 this afternoon.  He had some slight decline all along, but worsening today.  The patient is somewhat frustrated.  He just tells me that he feels bad.  He has generalized weakness.  He states that he is unable to do anything.  He has a history of A. fib and takes Eliquis, and sees Dr. Meyer.  CT of the head was negative.        Review of Systems   Confusion  Generalized weakness    Otherwise complete ROS reviewed and negative except as mentioned in the HPI.    Past Medical History:   Past Medical History:   Diagnosis Date   • A-fib (CMS/HCC)    • Arthritis    • Cancer (CMS/HCC)     skin   • Diabetes mellitus (CMS/HCC)     borderline, no medication   • Disease of thyroid gland    • Dysrhythmia    • HL (hearing loss)    • Hyperlipidemia    • Hypertension    • IBS (irritable bowel syndrome)    • Lung mass 12/24/2018   • Neuropathy    • Non Hodgkin's lymphoma (CMS/HCC)      Past Surgical History:  Past Surgical History:   Procedure Laterality Date   • CARDIAC CATHETERIZATION     • PARATHYROID GLAND SURGERY     • PROSTATE SURGERY      PROSTATECTOMY   • SKIN CANCER EXCISION       Social History:  reports that he has never smoked. He has never used smokeless tobacco. He reports that he does not drink alcohol or use drugs.    Family History: family history includes Cancer in his father; Heart disease in his brother and mother.       Allergies:  No  Known Allergies  Medications:  Prior to Admission medications    Medication Sig Start Date End Date Taking? Authorizing Provider   allopurinol (ZYLOPRIM) 300 MG tablet Take 300 mg by mouth Daily.   Yes Reyes Stewart MD   Empagliflozin (JARDIANCE) 10 MG tablet Take 10 mg by mouth Daily.   Yes Reyes Stewart MD   apixaban (ELIQUIS) 5 MG tablet tablet Take 1 tablet by mouth Every 12 (Twelve) Hours. 8/8/19   Kateryna Draper APRN   B Complex Vitamins (VITAMIN B COMPLEX) capsule capsule Take 1 capsule by mouth Daily.    Reyes Stewart MD   digoxin (LANOXIN) 125 MCG tablet Take 1 tablet by mouth Daily. 8/8/19   Kateryna Draper APRN   finasteride (PROSCAR) 5 MG tablet Take 5 mg by mouth Daily.    Reyes Stewart MD   furosemide (LASIX) 20 MG tablet Take 1 tablet by mouth Daily.  Patient taking differently: Take 5 mg by mouth Daily. 6/11/19   Chris Meyer MD   HYDROcodone-acetaminophen (NORCO) 7.5-325 MG per tablet Take 1 tablet by mouth Every 6 (Six) Hours As Needed for Moderate Pain .    Reyes Stewart MD   Lidocaine-Prilocaine, Bulk, 2.5-2.5 % cream Apply 1 application topically to the appropriate area as directed As Needed (APPLY TO AREA OF PORT 30 MIN- 1 HR PRIOR TO PORT ACCESS).    Reyes Stewart MD   loratadine (CLARITIN) 10 MG tablet Take 10 mg by mouth Daily.    Reyes Stewart MD   megestrol (MEGACE) 40 MG/ML suspension Take 400 mg by mouth Daily.    Reyes Stewart MD   melatonin 5 MG tablet tablet Take 5 mg by mouth Every Night.    Reyes Stewart MD   metFORMIN (GLUCOPHAGE) 1000 MG tablet Take 1,000 mg by mouth 2 (Two) Times a Day With Meals.    Reyes Stewart MD   metoprolol tartrate (LOPRESSOR) 25 MG tablet Take 0.5 tablets by mouth Every 12 (Twelve) Hours. 5/20/19   Stephane Ruvalcaba DO   pantoprazole (PROTONIX) 40 MG EC tablet Take 40 mg by mouth 2 (Two) Times a Day.    Reyes Stewart MD   potassium chloride  "(K-DUR,KLOR-CON) 20 MEQ CR tablet Take 1 tablet by mouth Daily. 7/17/19   Chris Meyer MD   traZODone (DESYREL) 50 MG tablet Take 50 mg by mouth Every Night.    Provider, MD Reyes     Objective     Vital Signs: /62 (BP Location: Right arm, Patient Position: Lying)   Pulse 111   Temp 97.8 °F (36.6 °C)   Resp 18   Ht 188 cm (74\")   Wt 66.2 kg (146 lb)   SpO2 96%   BMI 18.75 kg/m²    Physical Exam   HENT:   Head: Normocephalic and atraumatic.   Nose: Nose normal.   Mouth/Throat: Oropharynx is clear and moist.   Patient is hard of hearing   Eyes: Conjunctivae and EOM are normal.   Neck: Normal range of motion. Neck supple.   Cardiovascular: Normal rate, regular rhythm and normal heart sounds.   Pulmonary/Chest: Effort normal and breath sounds normal.   Abdominal: Soft. Bowel sounds are normal.   Musculoskeletal: He exhibits no edema or tenderness.   Neurological: He is alert. No cranial nerve deficit.   Patient has no focal deficits   Skin: Skin is warm and dry.   Psychiatric: He has a normal mood and affect.   Vitals reviewed.          Results Reviewed:  Lab Results (last 24 hours)     Procedure Component Value Units Date/Time    [place] Draw [[id number]] Collected:  11/09/19 2151    Specimen:  Blood Updated:  11/09/19 2300    Narrative:       The following orders were created for panel order [place] Draw.  Procedure                               Abnormality         Status                     ---------                               -----------         ------                     Light Blue Top[[id number]]                                   Final result               Green Top (Gel)[[id number]]                                  Final result               Lavender Top[[id number]]                                     Final result               Red Top[[id number]]                                          In process                   Please view results for these tests on the individual orders.    Light " Blue Top [[id number]] Collected:  11/09/19 2151    Specimen:  Blood Updated:  11/09/19 2300     Extra Tube hold for add-on     Comment: Auto resulted       Green Top (Gel) [[id number]] Collected:  11/09/19 2151    Specimen:  Blood Updated:  11/09/19 2300     Extra Tube Hold for add-ons.     Comment: Auto resulted.       Lavender Top [[id number]] Collected:  11/09/19 2151    Specimen:  Blood Updated:  11/09/19 2300     Extra Tube hold for add-on     Comment: Auto resulted       Urinalysis, Microscopic Only - Urine, Clean Catch [[id number]]  (Abnormal) Collected:  11/09/19 2221    Specimen:  Urine, Clean Catch Updated:  11/09/19 2249     RBC, UA 6-12 /HPF      WBC, UA None Seen /HPF      Bacteria, UA None Seen /HPF      Squamous Epithelial Cells, UA None Seen /HPF      Hyaline Casts, UA None Seen /LPF      Methodology Automated Microscopy    Urinalysis With Microscopic If Indicated (No Culture) - Urine, Clean Catch [[id number]]  (Abnormal) Collected:  11/09/19 2221    Specimen:  Urine, Clean Catch Updated:  11/09/19 2249     Color, UA Yellow     Appearance, UA Clear     pH, UA 5.5     Specific Gravity, UA 1.010     Glucose, UA >=1000 mg/dL (3+)     Ketones, UA Negative     Bilirubin, UA Negative     Blood, UA Small (1+)     Protein, UA Negative     Leuk Esterase, UA Negative     Nitrite, UA Negative     Urobilinogen, UA 0.2 E.U./dL    Digoxin Level [[id number]]  (Normal) Collected:  11/09/19 2151    Specimen:  Blood Updated:  11/09/19 2241     Digoxin 0.90 ng/mL     Ammonia [[id number]]  (Normal) Collected:  11/09/19 2212    Specimen:  Blood Updated:  11/09/19 2236     Ammonia 24 umol/L     Comprehensive Metabolic Panel [[id number]]  (Abnormal) Collected:  11/09/19 2151    Specimen:  Blood Updated:  11/09/19 2223     Glucose 230 mg/dL      BUN 19 mg/dL      Creatinine 0.77 mg/dL      Sodium 132 mmol/L      Potassium 4.4 mmol/L      Chloride 96 mmol/L      CO2 21.0 mmol/L      Calcium 9.4 mg/dL      Total Protein 6.0 g/dL       Albumin 3.60 g/dL      ALT (SGPT) 34 U/L      AST (SGOT) 28 U/L      Alkaline Phosphatase 78 U/L      Total Bilirubin 0.5 mg/dL      eGFR Non African Amer 97 mL/min/1.73      Globulin 2.4 gm/dL      A/G Ratio 1.5 g/dL      BUN/Creatinine Ratio 24.7     Anion Gap 15.0 mmol/L     Narrative:       GFR Normal >60  Chronic Kidney Disease <60  Kidney Failure <15    CBC & Differential [[id number]] Collected:  11/09/19 2151    Specimen:  Blood Updated:  11/09/19 2210    Narrative:       The following orders were created for panel order CBC & Differential.  Procedure                               Abnormality         Status                     ---------                               -----------         ------                     CBC Auto Differential[[id number]]        Abnormal            Final result                 Please view results for these tests on the individual orders.    CBC Auto Differential [[id number]]  (Abnormal) Collected:  11/09/19 2151    Specimen:  Blood Updated:  11/09/19 2210     WBC 6.88 10*3/mm3      RBC 2.88 10*6/mm3      Hemoglobin 9.7 g/dL      Hematocrit 28.6 %      MCV 99.3 fL      MCH 33.7 pg      MCHC 33.9 g/dL      RDW 16.4 %      RDW-SD 57.1 fl      MPV 10.7 fL      Platelets 124 10*3/mm3      Neutrophil % 66.7 %      Lymphocyte % 13.7 %      Monocyte % 11.5 %      Eosinophil % 6.7 %      Basophil % 0.4 %      Immature Grans % 1.0 %      Neutrophils, Absolute 4.59 10*3/mm3      Lymphocytes, Absolute 0.94 10*3/mm3      Monocytes, Absolute 0.79 10*3/mm3      Eosinophils, Absolute 0.46 10*3/mm3      Basophils, Absolute 0.03 10*3/mm3      Immature Grans, Absolute 0.07 10*3/mm3      nRBC 0.6 /100 WBC     Red Top [[id number]] Collected:  11/09/19 2151    Specimen:  Blood Updated:  11/09/19 2156        Imaging Results (Last 24 Hours)     Procedure Component Value Units Date/Time    CT Head Without Contrast [[id number]] Resulted:  11/09/19 2340     Updated:  11/09/19 2341    XR Chest 1 View [[id number]]  Updated:  11/09/19 3382        I have personally reviewed and interpreted the radiology studies and ECG obtained at time of admission.     Assessment / Plan     Assessment:   Active Hospital Problems    Diagnosis   • Transient ischemic attack     Impression:  1.  Altered mental status with confusion  2.  B-cell lymphoma trying new chemotherapy regimen  3.  Mild hyponatremia  4.  Mild microcytic anemia  5.  Thrombocytopenia  6.  Diabetes mellitus type 2, chemotherapy-induced  7.  A. fib, currently on Eliquis    Plan:   1.  MRI of the brain with and without contrast in the morning  2.  Consult neurology  3.  PT OT consult  4.  Resume home medications  5.  Labs in the morning  6.  Hold Glucophage, and start sliding scale insulin with Accu-Cheks  7.  Neurochecks    Code Status: Full     I discussed the patient's findings and my recommendations with the patient and family at bedside.  The patient's daughter is a pharmacist at the hospital    Estimated length of stay 2 to 3 days    Nasir Cordon DO   11/10/19   1:50 AM              Electronically signed by Nasir Cordon DO at 11/10/19 0156       Physician Progress Notes (most recent note)     No notes of this type exist for this encounter.           Consult Notes (most recent note)      Ulises Youssef MD at 11/10/19 1325          Neurology Consult Note    Referring Provider: Dr. Brigido Angel  Reason for Consultation: stroke      History of present illness:      This is a 79-year-old male with a diagnosis earlier this year of a diffuse B-cell lymphoma.  He is currently undergoing radiation and chemotherapy for this.  He recently initiated a new chemotherapy within the last 6 weeks called Polivy.  According to records this chemotherapy is associated with a neuropathy but does not list confusion or an aphasia as common side effects.  His family is at the bedside and assist in the history.  They reported that yesterday he had an acute onset of difficulties  with finding words.  He had no other neurologic symptoms such as visual changes, facial drooping, unilateral weakness, nor unilateral numbness.  He presented to our ER.  He did not receive intravenous TPA given his low NIH stroke scale of 1 in addition to the fact that he had a history of cancer and would be an increased bleed risk.  He was admitted overnight his symptoms resolved.  He is back to his baseline now.  He does admit to a mild headache yesterday.  He does not have this currently.  He has no current neurologic deficits.  He is extremely interested in going home.      Past Medical History:   Diagnosis Date   • A-fib (CMS/[facility])    • Arthritis    • Cancer (CMS/[facility])     skin   • Diabetes mellitus (CMS/[facility])     borderline, no medication   • Disease of thyroid gland    • Dysrhythmia    • HL (hearing loss)    • Hyperlipidemia    • Hypertension    • IBS (irritable bowel syndrome)    • Lung mass 12/24/2018   • Neuropathy    • Non Hodgkin's lymphoma (CMS/[facility])        No Known Allergies  No current facility-administered medications on file prior to encounter.      Current Outpatient Medications on File Prior to Encounter   Medication Sig   • allopurinol (ZYLOPRIM) 300 MG tablet Take 300 mg by mouth Daily.   • Empagliflozin (JARDIANCE) 10 MG tablet Take 10 mg by mouth Daily.   • apixaban (ELIQUIS) 5 MG tablet tablet Take 1 tablet by mouth Every 12 (Twelve) Hours.   • B Complex Vitamins (VITAMIN B COMPLEX) capsule capsule Take 1 capsule by mouth Daily.   • digoxin (LANOXIN) 125 MCG tablet Take 1 tablet by mouth Daily.   • finasteride (PROSCAR) 5 MG tablet Take 5 mg by mouth Daily.   • furosemide (LASIX) 20 MG tablet Take 1 tablet by mouth Daily. (Patient taking differently: Take 5 mg by mouth Daily.)   • HYDROcodone-acetaminophen (NORCO) 7.5-325 MG per tablet Take 1 tablet by mouth Every 6 (Six) Hours As Needed for Moderate Pain .   • Lidocaine-Prilocaine, Bulk, 2.5-2.5 % cream Apply 1 application topically to the  appropriate area as directed As Needed (APPLY TO AREA OF PORT 30 MIN- 1 HR PRIOR TO PORT ACCESS).   • loratadine (CLARITIN) 10 MG tablet Take 10 mg by mouth Daily.   • megestrol (MEGACE) 40 MG/ML suspension Take 400 mg by mouth Daily.   • melatonin 5 MG tablet tablet Take 5 mg by mouth Every Night.   • metFORMIN (GLUCOPHAGE) 1000 MG tablet Take 1,000 mg by mouth 2 (Two) Times a Day With Meals.   • metoprolol tartrate (LOPRESSOR) 25 MG tablet Take 0.5 tablets by mouth Every 12 (Twelve) Hours.   • pantoprazole (PROTONIX) 40 MG EC tablet Take 40 mg by mouth 2 (Two) Times a Day.   • potassium chloride (K-DUR,KLOR-CON) 20 MEQ CR tablet Take 1 tablet by mouth Daily.   • traZODone (DESYREL) 50 MG tablet Take 50 mg by mouth Every Night.       Social History     Socioeconomic History   • Marital status:      Spouse name: Not on file   • Number of children: Not on file   • Years of education: Not on file   • Highest education level: Not on file   Tobacco Use   • Smoking status: Never Smoker   • Smokeless tobacco: Never Used   Substance and Sexual Activity   • Alcohol use: No   • Drug use: No   • Sexual activity: Defer     Family History   Problem Relation Age of Onset   • Heart disease Mother    • Cancer Father    • Heart disease Brother        Review of Systems  A 14 point review of systems was reviewed and was negative except for speech changes    Vital Signs   Temp:  [97.8 °F (36.6 °C)-98.9 °F (37.2 °C)] 98.9 °F (37.2 °C)  Heart Rate:  [] 101  Resp:  [16-18] 16  BP: (100-141)/(54-96) 100/67    General Exam:  Head:  Normal cephalic, atraumatic  HEENT:  Neck supple  Fundoscopic Exam:  No signs of disc edema  CVS:  Regular rate and rhythm.  No murmurs  Carotid Examination:  No bruits  Lungs:  Clear to auscultation  Abdomen:  Non-tender, Non-distended  Extremities:  No signs of peripheral edema  Skin:  No rashes    Neurologic Exam:    Mental Status:    -Awake, Alert, Oriented X 3  -No word finding  difficulties  -No aphasia  -No dysarthria  -Follows simple and complex commands    CN II:  Visual fields full.  Pupils equally reactive to light  CN III, IV, VI:  Extraocular Muscles full with no signs of nystagmus  CN V:  Facial sensory is symmetric with no asymmetries.  CN VII:  Facial motor symmetric  CN VIII:  Gross hearing intact bilaterally  CN IX:  Palate elevates symmetrically  CN X:  Palate elevates symmetrically  CN XI:  Shoulder shrug symmetric  CN XII:  Tongue is midline on protrusion    Motor: (strength out of 5:  1= minimal movement, 2 = movement in plane of gravity, 3 = movement against gravity, 4 = movement against some resistance, 5 = full strength)    -Right Upper Ext: Proximal: 5 Distal: 5  -Left Upper Ext: Proximal: 5 Distal: 5    -Right Lower Ext: Proximal: 5 Distal: 5  -Left Lower Ext: Proximal: 5 Distal: 5    DTR:  -Right   Bicep: 2+ Tricep: 2+ Brachoradialis: 2+   Patella: 2+ Ankle: 2+ Neg Babinski  -Left   Bicep: 2+ Tricep: 2+ Brachoradialis: 2+   Patella: 2+ Ankle: 2+ Neg Babinski    Sensory:  -Intact to light touch, pinprick, temperature, pain, and proprioception    Coordination:  -Finger to nose intact  -Heel to shin intact  -No ataxia    Gait  -No signs of ataxia  -ambulates unassisted      Results Review:  Lab Results (last 24 hours)     Procedure Component Value Units Date/Time    Troponin [[id number]] Collected:  11/10/19 1310    Specimen:  Blood Updated:  11/10/19 1319    POC Glucose Once [[id number]]  (Abnormal) Collected:  11/10/19 1253    Specimen:  Blood Updated:  11/10/19 1304     Glucose 185 mg/dL      Comment: : [id number] Lesly VargasMeter ID: [id number]       POC Glucose Once [[id number]]  (Abnormal) Collected:  11/10/19 0851    Specimen:  Blood Updated:  11/10/19 0903     Glucose 159 mg/dL      Comment: : [id number] Cristalkartik ButlerricaMeter ID: [id number]       Troponin [[id number]]  (Abnormal) Collected:  11/10/19 0708    Specimen:  Blood Updated:  11/10/19 0746      Troponin T 0.073 ng/mL     Narrative:       Troponin T Reference Range:  <= 0.03 ng/mL-   Negative for AMI  >0.03 ng/mL-     Abnormal for myocardial necrosis.  Clinicians would have to utilize clinical acumen, EKG, Troponin and serial changes to determine if it is an Acute Myocardial Infarction or myocardial injury due to an underlying chronic condition.     CBC Auto Differential [[id number]]  (Abnormal) Collected:  11/10/19 0437    Specimen:  Blood Updated:  11/10/19 0644     WBC 5.99 10*3/mm3      RBC 2.68 10*6/mm3      Hemoglobin 9.0 g/dL      Hematocrit 26.6 %      MCV 99.3 fL      MCH 33.6 pg      MCHC 33.8 g/dL      RDW 16.3 %      RDW-SD 56.8 fl      MPV 11.7 fL      Platelets 127 10*3/mm3     Manual Differential [[id number]]  (Abnormal) Collected:  11/10/19 0437    Specimen:  Blood Updated:  11/10/19 0644     Neutrophil % 62.2 %      Lymphocyte % 11.2 %      Monocyte % 3.1 %      Eosinophil % 7.1 %      Basophil % 4.1 %      Bands %  12.2 %      Neutrophils Absolute 4.46 10*3/mm3      Lymphocytes Absolute 0.67 10*3/mm3      Monocytes Absolute 0.19 10*3/mm3      Eosinophils Absolute 0.43 10*3/mm3      Basophils Absolute 0.25 10*3/mm3      Anisocytosis Slight/1+     Polychromasia Slight/1+     WBC Morphology Normal     Platelet Morphology Normal    Troponin [[id number]]  (Abnormal) Collected:  11/10/19 0437    Specimen:  Blood Updated:  11/10/19 0640     Troponin T 0.071 ng/mL     Narrative:       Troponin T Reference Range:  <= 0.03 ng/mL-   Negative for AMI  >0.03 ng/mL-     Abnormal for myocardial necrosis.  Clinicians would have to utilize clinical acumen, EKG, Troponin and serial changes to determine if it is an Acute Myocardial Infarction or myocardial injury due to an underlying chronic condition.     Comprehensive Metabolic Panel [[id number]]  (Abnormal) Collected:  11/10/19 0437    Specimen:  Blood Updated:  11/10/19 0636     Glucose 154 mg/dL      BUN 16 mg/dL      Creatinine 0.74 mg/dL      Sodium 134  mmol/L      Potassium 3.8 mmol/L      Chloride 101 mmol/L      CO2 21.0 mmol/L      Calcium 8.6 mg/dL      Total Protein 5.5 g/dL      Albumin 3.50 g/dL      ALT (SGPT) 30 U/L      AST (SGOT) 23 U/L      Alkaline Phosphatase 95 U/L      Total Bilirubin 0.6 mg/dL      eGFR Non African Amer 102 mL/min/1.73      Globulin 2.0 gm/dL      A/G Ratio 1.8 g/dL      BUN/Creatinine Ratio 21.6     Anion Gap 12.0 mmol/L     Narrative:       GFR Normal >60  Chronic Kidney Disease <60  Kidney Failure <15    Lipid Panel [[id number]]  (Abnormal) Collected:  11/10/19 0437    Specimen:  Blood Updated:  11/10/19 0636     Total Cholesterol 131 mg/dL      Triglycerides 422 mg/dL      HDL Cholesterol 18 mg/dL      LDL Cholesterol  --     Comment: Unable to calculate        VLDL Cholesterol --     Comment: Unable to calculate        LDL/HDL Ratio --     Comment: Unable to calculate       Narrative:       Cholesterol Reference Ranges  (U.S. Department of Health and Human Services ATP III Classifications)    Desirable          <200 mg/dL  Borderline High    200-239 mg/dL  High Risk          >240 mg/dL      Triglyceride Reference Ranges  (U.S. Department of Health and Human Services ATP III Classifications)    Normal           <150 mg/dL  Borderline High  150-199 mg/dL  High             200-499 mg/dL  Very High        >500 mg/dL    HDL Reference Ranges  (U.S. Department of Health and Human Services ATP III Classifcations)    Low     <40 mg/dl (major risk factor for CHD)  High    >60 mg/dl ('negative' risk factor for CHD)        LDL Reference Ranges  (U.S. Department of Health and Human Services ATP III Classifcations)    Optimal          <100 mg/dL  Near Optimal     100-129 mg/dL  Borderline High  130-159 mg/dL  High             160-189 mg/dL  Very High        >189 mg/dL    Magnesium [[id number]]  (Abnormal) Collected:  11/10/19 0437    Specimen:  Blood Updated:  11/10/19 0636     Magnesium 1.5 mg/dL     Phosphorus [[id number]]  (Abnormal)  Collected:  11/10/19 0437    Specimen:  Blood Updated:  11/10/19 0636     Phosphorus 2.2 mg/dL     TSH [[id number]]  (Normal) Collected:  11/10/19 0437    Specimen:  Blood Updated:  11/10/19 0636     TSH 1.250 uIU/mL     LDL Cholesterol, Direct [[id number]] Collected:  11/10/19 0437    Specimen:  Blood Updated:  11/10/19 0636    POC Glucose Once [[id number]]  (Abnormal) Collected:  11/10/19 0244    Specimen:  Blood Updated:  11/10/19 0304     Glucose 147 mg/dL      Comment: : [id number] Nigel Rolle (Drury) AnnMeter ID: [id number]       [place] Draw [[id number]] Collected:  11/09/19 2151    Specimen:  Blood Updated:  11/09/19 2300    Narrative:       The following orders were created for panel order [place] [facility].  Procedure                               Abnormality         Status                     ---------                               -----------         ------                     Light Blue Top[[id number]]                                   Final result               Green Top (Gel)[[id number]]                                  Final result               Lavender Top[[id number]]                                     Final result               Red Top[[id number]]                                          In process                   Please view results for these tests on the individual orders.    Light Blue Top [[id number]] Collected:  11/09/19 2151    Specimen:  Blood Updated:  11/09/19 2300     Extra Tube hold for add-on     Comment: Auto resulted       Green Top (Gel) [[id number]] Collected:  11/09/19 2151    Specimen:  Blood Updated:  11/09/19 2300     Extra Tube Hold for add-ons.     Comment: Auto resulted.       Lavender Top [[id number]] Collected:  11/09/19 2151    Specimen:  Blood Updated:  11/09/19 2300     Extra Tube hold for add-on     Comment: Auto resulted       Urinalysis, Microscopic Only - Urine, Clean Catch [[id number]]  (Abnormal) Collected:  11/09/19 2221    Specimen:  Urine, Clean Catch Updated:  11/09/19  2249     RBC, UA 6-12 /HPF      WBC, UA None Seen /HPF      Bacteria, UA None Seen /HPF      Squamous Epithelial Cells, UA None Seen /HPF      Hyaline Casts, UA None Seen /LPF      Methodology Automated Microscopy    Urinalysis With Microscopic If Indicated (No Culture) - Urine, Clean Catch [[id number]]  (Abnormal) Collected:  11/09/19 2221    Specimen:  Urine, Clean Catch Updated:  11/09/19 2249     Color, UA Yellow     Appearance, UA Clear     pH, UA 5.5     Specific Gravity, UA 1.010     Glucose, UA >=1000 mg/dL (3+)     Ketones, UA Negative     Bilirubin, UA Negative     Blood, UA Small (1+)     Protein, UA Negative     Leuk Esterase, UA Negative     Nitrite, UA Negative     Urobilinogen, UA 0.2 E.U./dL    Digoxin Level [[id number]]  (Normal) Collected:  11/09/19 2151    Specimen:  Blood Updated:  11/09/19 2241     Digoxin 0.90 ng/mL     Ammonia [[id number]]  (Normal) Collected:  11/09/19 2212    Specimen:  Blood Updated:  11/09/19 2236     Ammonia 24 umol/L     Comprehensive Metabolic Panel [[id number]]  (Abnormal) Collected:  11/09/19 2151    Specimen:  Blood Updated:  11/09/19 2223     Glucose 230 mg/dL      BUN 19 mg/dL      Creatinine 0.77 mg/dL      Sodium 132 mmol/L      Potassium 4.4 mmol/L      Chloride 96 mmol/L      CO2 21.0 mmol/L      Calcium 9.4 mg/dL      Total Protein 6.0 g/dL      Albumin 3.60 g/dL      ALT (SGPT) 34 U/L      AST (SGOT) 28 U/L      Alkaline Phosphatase 78 U/L      Total Bilirubin 0.5 mg/dL      eGFR Non African Amer 97 mL/min/1.73      Globulin 2.4 gm/dL      A/G Ratio 1.5 g/dL      BUN/Creatinine Ratio 24.7     Anion Gap 15.0 mmol/L     Narrative:       GFR Normal >60  Chronic Kidney Disease <60  Kidney Failure <15    CBC & Differential [[id number]] Collected:  11/09/19 2151    Specimen:  Blood Updated:  11/09/19 2210    Narrative:       The following orders were created for panel order CBC & Differential.  Procedure                               Abnormality         Status                      ---------                               -----------         ------                     CBC Auto Differential[[id number]]        Abnormal            Final result                 Please view results for these tests on the individual orders.    CBC Auto Differential [[id number]]  (Abnormal) Collected:  11/09/19 2151    Specimen:  Blood Updated:  11/09/19 2210     WBC 6.88 10*3/mm3      RBC 2.88 10*6/mm3      Hemoglobin 9.7 g/dL      Hematocrit 28.6 %      MCV 99.3 fL      MCH 33.7 pg      MCHC 33.9 g/dL      RDW 16.4 %      RDW-SD 57.1 fl      MPV 10.7 fL      Platelets 124 10*3/mm3      Neutrophil % 66.7 %      Lymphocyte % 13.7 %      Monocyte % 11.5 %      Eosinophil % 6.7 %      Basophil % 0.4 %      Immature Grans % 1.0 %      Neutrophils, Absolute 4.59 10*3/mm3      Lymphocytes, Absolute 0.94 10*3/mm3      Monocytes, Absolute 0.79 10*3/mm3      Eosinophils, Absolute 0.46 10*3/mm3      Basophils, Absolute 0.03 10*3/mm3      Immature Grans, Absolute 0.07 10*3/mm3      nRBC 0.6 /100 WBC     Red Top [[id number]] Collected:  11/09/19 2151    Specimen:  Blood Updated:  11/09/19 2156          .  Imaging Results (Last 24 Hours)     Procedure Component Value Units Date/Time    SCANNED - IMAGING [[id number]] Resulted:  11/09/19      Updated:  11/10/19 1058    MRI Brain With & Without Contrast [[id number]] Updated:  11/10/19 1037    US Carotid Bilateral [[id number]] Updated:  11/10/19 0921    XR Chest 1 View [[id number]] Collected:  11/10/19 0752     Updated:  11/10/19 0757    Narrative:       EXAMINATION: Chest 1 view 11/09/2019     HISTORY: Confusion     FINDINGS: Upright frontal projection of the chest is compared to prior  exam of 07/16/2019. 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 There is a focus of nodular consolidation projecting  over the periphery of the left upper lobe. On CT examination of  September of this year  there was a developing groundglass opacity in  this same distribution. At that time it was suspected to perhaps be  related to post radiation change. It does show progression from the  previous CT. The lungs are otherwise clear. There is no effusion or free  air present. A tunneled infusion catheter is in place via left-sided  approach.       Impression:       1.. Nodular consolidation in the periphery of the left upper lobe may  represent post treatment changes. There has been marked interval  decrease in size of the pleural-based mass within the periphery of the  left upper lobe.  2. Lungs are otherwise clear.  This report was finalized on 11/10/2019 07:54 by Dr. Octavio Ochoa MD.    CT Head Without Contrast [[id number]] Collected:  11/10/19 0735     Updated:  11/10/19 0740    Narrative:       CT BRAIN without contrast 11/9/2019 10:33 PM CST     HISTORY: Neoplasm of brain suspected.     COMPARISON: None      DLP: 586 mGy cm. Automated exposure control was utilized to diminish  patient radiation dose.     TECHNIQUE: Serial axial tomographic images of the brain were obtained  without the use of intravenous contrast.      FINDINGS:   The midline structures are nondisplaced. There is mild cerebral and  cerebellar volume loss, with an associated increase in the prominence of  the ventricles and sulci. The basilar cisterns are normal in size and  configuration. There is no evidence of intracranial hemorrhage or  mass-effect. There is low attenuation in the periventricular white  matter, consistent with chronic ischemic change. There are no abnormal  extra-axial fluid collections. There is no evidence of tonsillar  herniation.      The included orbits and their contents are unremarkable. The visualized  paranasal sinuses, mastoid air cells and middle ear cavities are clear.  The visualized osseous structures and overlying soft tissues of the  skull and face are intact.        Impression:       Mild cerebral and  cerebellar volume loss with chronic microvascular  disease but no evidence of acute intracranial process.        This report was finalized on 11/10/2019 07:37 by Dr. Octavio Ochoa MD.        MRI of the brain with and without contrast show no signs of diffusion abnormalities.  Flair sequencing shows some mild periventricular white matter disease that is unremarkable.  Contrasted sequencing shows no signs of any contrast enhancement to suggest a tumor burden.    Carotid ultrasound shows no plaque burden with moderate stenoses bilaterally.      Impression    79-year-old male with atypical episode of what sounds to be an aphasia.  The etiology behind this is unclear.  It may have been secondary to the chemotherapy; however, it is not listed as 1 of the common side effects.  It may be a transient ischemic attack as well; however, I find minimal stroke risk factors for this with exception of the fact that he may be somewhat hypercoagulable from his underlying malignancy.  He does have atrial fibrillation but is on anticoagulation therapy currently.  He describes compliance with this.  His carotid ultrasound is unremarkable.  Cardiac echo is currently pending.  He has no current therapy needs.  I think it is reasonable for him to be discharged home today.  He continues having spells like this we may need to proceed with a further work-up including EEG.  In the meantime I hesitate to add an antiplatelet to his regimen given he is already on anticoagulation has low platelets likely secondary to chemotherapy and malignancy.    Plan    · Will follow official results of MRI brain with and without contrast although I see no signs of diffusion restrictions nor tumor burden  · Follow-up official results of cardiac echo  · Clear from neurology standpoint to be discharged home today.  I provided them with my card in my office number.  If the spells were to recur they are to call immediately.  If they were to recur we likely  would consider doing an EEG.    I discussed the patients findings and my recommendations with patient and family    Ulises Youssef MD  11/10/19  1:25 PM        Electronically signed by Ulises Youssef MD at 11/10/19 1355       Physical Therapy Notes (most recent note)     No notes of this type exist for this encounter.        Occupational Therapy Notes (most recent note)     No notes of this type exist for this encounter.           Discharge Summary      Trinh Paul APRN at 11/10/19 1537              [facility] Medicine Services  DISCHARGE SUMMARY       Date of Admission: 11/9/2019  Date of Discharge:  11/10/2019  Primary Care Physician: Woody Mathis DO    Presenting Problem/History of Present Illness:  Word finding difficulty    Final Discharge Diagnoses:  Active Hospital Problems    Diagnosis   • Aphasia   • Hypertension   • Diabetes mellitus (CMS/HCC)   • Disease of thyroid gland   • Hyperlipidemia   • Diffuse large B cell lymphoma (CMS/HCC)   • Elevated troponin   • Persistent atrial fibrillation     Consults:   1.  Dr. Ulises Youssef- Neurology    Procedures Performed: None    Pertinent Test Results:   Lab Results (all)     Procedure Component Value Units Date/Time    Troponin [[id number]]  (Abnormal) Collected:  11/10/19 1432    Specimen:  Blood Updated:  11/10/19 1532     Troponin T 0.056 ng/mL     POC Glucose Once [[id number]]  (Abnormal) Collected:  11/10/19 1253    Specimen:  Blood Updated:  11/10/19 1304     Glucose 185 mg/dL      Comment: : [id number] Lesly VargasMeter ID: [id number]       POC Glucose Once [[id number]]  (Abnormal) Collected:  11/10/19 0851    Specimen:  Blood Updated:  11/10/19 0903     Glucose 159 mg/dL      Comment: : [id number] Cristal JerricaMeter ID: [id number]       Troponin [[id number]]  (Abnormal) Collected:  11/10/19 0708    Specimen:  Blood Updated:  11/10/19 0746     Troponin T 0.073 ng/mL     CBC Auto  Differential [[id number]]  (Abnormal) Collected:  11/10/19 0437    Specimen:  Blood Updated:  11/10/19 0644     WBC 5.99 10*3/mm3      RBC 2.68 10*6/mm3      Hemoglobin 9.0 g/dL      Hematocrit 26.6 %      MCV 99.3 fL      MCH 33.6 pg      MCHC 33.8 g/dL      RDW 16.3 %      RDW-SD 56.8 fl      MPV 11.7 fL      Platelets 127 10*3/mm3     Manual Differential [[id number]]  (Abnormal) Collected:  11/10/19 0437    Specimen:  Blood Updated:  11/10/19 0644     Neutrophil % 62.2 %      Lymphocyte % 11.2 %      Monocyte % 3.1 %      Eosinophil % 7.1 %      Basophil % 4.1 %      Bands %  12.2 %      Neutrophils Absolute 4.46 10*3/mm3      Lymphocytes Absolute 0.67 10*3/mm3      Monocytes Absolute 0.19 10*3/mm3      Eosinophils Absolute 0.43 10*3/mm3      Basophils Absolute 0.25 10*3/mm3      Anisocytosis Slight/1+     Polychromasia Slight/1+     WBC Morphology Normal     Platelet Morphology Normal    Troponin [[id number]]  (Abnormal) Collected:  11/10/19 0437    Specimen:  Blood Updated:  11/10/19 0640     Troponin T 0.071 ng/mL     Comprehensive Metabolic Panel [[id number]]  (Abnormal) Collected:  11/10/19 0437    Specimen:  Blood Updated:  11/10/19 0636     Glucose 154 mg/dL      BUN 16 mg/dL      Creatinine 0.74 mg/dL      Sodium 134 mmol/L      Potassium 3.8 mmol/L      Chloride 101 mmol/L      CO2 21.0 mmol/L      Calcium 8.6 mg/dL      Total Protein 5.5 g/dL      Albumin 3.50 g/dL      ALT (SGPT) 30 U/L      AST (SGOT) 23 U/L      Alkaline Phosphatase 95 U/L      Total Bilirubin 0.6 mg/dL      eGFR Non African Amer 102 mL/min/1.73      Globulin 2.0 gm/dL      A/G Ratio 1.8 g/dL      BUN/Creatinine Ratio 21.6     Anion Gap 12.0 mmol/L     Lipid Panel [[id number]]  (Abnormal) Collected:  11/10/19 0437    Specimen:  Blood Updated:  11/10/19 0636     Total Cholesterol 131 mg/dL      Triglycerides 422 mg/dL      HDL Cholesterol 18 mg/dL      LDL Cholesterol  --     Comment: Unable to calculate        VLDL Cholesterol --      Comment: Unable to calculate        LDL/HDL Ratio --     Comment: Unable to calculate       Magnesium [[id number]]  (Abnormal) Collected:  11/10/19 0437    Specimen:  Blood Updated:  11/10/19 0636     Magnesium 1.5 mg/dL     Phosphorus [[id number]]  (Abnormal) Collected:  11/10/19 0437    Specimen:  Blood Updated:  11/10/19 0636     Phosphorus 2.2 mg/dL     TSH [[id number]]  (Normal) Collected:  11/10/19 0437    Specimen:  Blood Updated:  11/10/19 0636     TSH 1.250 uIU/mL     LDL Cholesterol, Direct [[id number]] Collected:  11/10/19 0437    Specimen:  Blood Updated:  11/10/19 0636    POC Glucose Once [[id number]]  (Abnormal) Collected:  11/10/19 0244    Specimen:  Blood Updated:  11/10/19 0304     Glucose 147 mg/dL      Comment: : [id number] Nigel Rolle (Drury) AnnMeter ID: [id number]       Urinalysis, Microscopic Only - Urine, Clean Catch [[id number]]  (Abnormal) Collected:  11/09/19 2221    Specimen:  Urine, Clean Catch Updated:  11/09/19 2249     RBC, UA 6-12 /HPF      WBC, UA None Seen /HPF      Bacteria, UA None Seen /HPF      Squamous Epithelial Cells, UA None Seen /HPF      Hyaline Casts, UA None Seen /LPF      Methodology Automated Microscopy    Urinalysis With Microscopic If Indicated (No Culture) - Urine, Clean Catch [[id number]]  (Abnormal) Collected:  11/09/19 2221    Specimen:  Urine, Clean Catch Updated:  11/09/19 2249     Color, UA Yellow     Appearance, UA Clear     pH, UA 5.5     Specific Gravity, UA 1.010     Glucose, UA >=1000 mg/dL (3+)     Ketones, UA Negative     Bilirubin, UA Negative     Blood, UA Small (1+)     Protein, UA Negative     Leuk Esterase, UA Negative     Nitrite, UA Negative     Urobilinogen, UA 0.2 E.U./dL    Digoxin Level [[id number]]  (Normal) Collected:  11/09/19 2151    Specimen:  Blood Updated:  11/09/19 2241     Digoxin 0.90 ng/mL     Ammonia [[id number]]  (Normal) Collected:  11/09/19 2212    Specimen:  Blood Updated:  11/09/19 2236     Ammonia 24 umol/L     Comprehensive  Metabolic Panel [[id number]]  (Abnormal) Collected:  11/09/19 2151    Specimen:  Blood Updated:  11/09/19 2223     Glucose 230 mg/dL      BUN 19 mg/dL      Creatinine 0.77 mg/dL      Sodium 132 mmol/L      Potassium 4.4 mmol/L      Chloride 96 mmol/L      CO2 21.0 mmol/L      Calcium 9.4 mg/dL      Total Protein 6.0 g/dL      Albumin 3.60 g/dL      ALT (SGPT) 34 U/L      AST (SGOT) 28 U/L      Alkaline Phosphatase 78 U/L      Total Bilirubin 0.5 mg/dL      eGFR Non African Amer 97 mL/min/1.73      Globulin 2.4 gm/dL      A/G Ratio 1.5 g/dL      BUN/Creatinine Ratio 24.7     Anion Gap 15.0 mmol/L     CBC Auto Differential [[id number]]  (Abnormal) Collected:  11/09/19 2151    Specimen:  Blood Updated:  11/09/19 2210     WBC 6.88 10*3/mm3      RBC 2.88 10*6/mm3      Hemoglobin 9.7 g/dL      Hematocrit 28.6 %      MCV 99.3 fL      MCH 33.7 pg      MCHC 33.9 g/dL      RDW 16.4 %      RDW-SD 57.1 fl      MPV 10.7 fL      Platelets 124 10*3/mm3      Neutrophil % 66.7 %      Lymphocyte % 13.7 %      Monocyte % 11.5 %      Eosinophil % 6.7 %      Basophil % 0.4 %      Immature Grans % 1.0 %      Neutrophils, Absolute 4.59 10*3/mm3      Lymphocytes, Absolute 0.94 10*3/mm3      Monocytes, Absolute 0.79 10*3/mm3      Eosinophils, Absolute 0.46 10*3/mm3      Basophils, Absolute 0.03 10*3/mm3      Immature Grans, Absolute 0.07 10*3/mm3      nRBC 0.6 /100 WBC     Red Top [[id number]] Collected:  11/09/19 2151    Specimen:  Blood Updated:  11/09/19 2156        Imaging Results (All)     Procedure Component Value Units Date/Time    MRI Brain With & Without Contrast [[id number]] Collected:  11/10/19 1503     Updated:  11/10/19 1510    Narrative:       MRI BRAIN W WO CONTRAST- 11/10/2019 10:04 AM CST     HISTORY: Altered mental status, B-cell lymphoma out CVA; G45.9-Transient  cerebral ischemic attack, unspecified     COMPARISON: None.       TECHNIQUE: Multiplanar imaging of the brain was performed in a routine  fashion before and after  the intravenous injection of gadolinium  contrast.     FINDINGS:   Diffusion: No restriction of diffusion to suggest acute ischemia.     Midline structures: Nondisplaced.     Ventricles: Normal in configuration and symmetric in size. There is mild  atrophy of the brain with prominence of the subarachnoid spaces and  ventricular enlargement.     Masses: No masses or mass effect.     Basilar cisterns: Maintained.     Extra axial space: No abnormal extra-axial fluid.     Gray-white matter signal: There are scattered foci of T2 abnormality  involving the periventricular and higher white matter tracts suggesting  small vessel disease.     Cerebellum: Normal.     Brainstem: Normal.     Enhancement: No abnormal enhancement.     Other: Proximal cervical spinal cord is normal. Bilateral globes and  orbits are normal in appearance. Normal cerebrovascular flow voids  noted. There is mucoperiosteal thickening of both maxillary sinuses as  well as of the ethmoid air cells consistent with chronic sinus disease.  The mastoid air cells are clear.       Impression:       1. There is atrophy of the brain. Small vessel ischemic changes are  noted involving periventricular and higher white matter tracts.  2. Chronic ethmoid air cells and bilateral maxillary sinus disease with  mucoperiosteal thickening. No air-fluid levels are present.  3. No restricted diffusion to suggest acute ischemia or infarct. There  are no areas of abnormal contrast enhancement present.         This report was finalized on 11/10/2019 15:07 by Dr. Octavio Ochoa MD.    SCANNED - IMAGING [[id number]] Resulted:  11/09/19      Updated:  11/10/19 1058    US Carotid Bilateral [[id number]] Updated:  11/10/19 0921    XR Chest 1 View [[id number]] Collected:  11/10/19 0752     Updated:  11/10/19 0757    Narrative:       EXAMINATION: Chest 1 view 11/09/2019     HISTORY: Confusion     FINDINGS: Upright frontal projection of the chest is compared to prior  exam of  07/16/2019. 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 There is a focus of nodular consolidation projecting  over the periphery of the left upper lobe. On CT examination of  September of this year there was a developing groundglass opacity in  this same distribution. At that time it was suspected to perhaps be  related to post radiation change. It does show progression from the  previous CT. The lungs are otherwise clear. There is no effusion or free  air present. A tunneled infusion catheter is in place via left-sided  approach.       Impression:       1.. Nodular consolidation in the periphery of the left upper lobe may  represent post treatment changes. There has been marked interval  decrease in size of the pleural-based mass within the periphery of the  left upper lobe.  2. Lungs are otherwise clear.  This report was finalized on 11/10/2019 07:54 by Dr. Octavio Ochoa MD.    CT Head Without Contrast [[id number]] Collected:  11/10/19 0735     Updated:  11/10/19 0740    Narrative:       CT BRAIN without contrast 11/9/2019 10:33 PM CST     HISTORY: Neoplasm of brain suspected.     COMPARISON: None      DLP: 586 mGy cm. Automated exposure control was utilized to diminish  patient radiation dose.     TECHNIQUE: Serial axial tomographic images of the brain were obtained  without the use of intravenous contrast.      FINDINGS:   The midline structures are nondisplaced. There is mild cerebral and  cerebellar volume loss, with an associated increase in the prominence of  the ventricles and sulci. The basilar cisterns are normal in size and  configuration. There is no evidence of intracranial hemorrhage or  mass-effect. There is low attenuation in the periventricular white  matter, consistent with chronic ischemic change. There are no abnormal  extra-axial fluid collections. There is no evidence of tonsillar  herniation.       The included orbits and their contents are unremarkable. The visualized  paranasal sinuses, mastoid air cells and middle ear cavities are clear.  The visualized osseous structures and overlying soft tissues of the  skull and face are intact.        Impression:       Mild cerebral and cerebellar volume loss with chronic microvascular  disease but no evidence of acute intracranial process.        This report was finalized on 11/10/2019 07:37 by Dr. Octavio Ochoa MD.        Chief Complaint on Day of Discharge: Weakness    Hospital Course:  Mr. Nunez is a 79-year-old  male who follows Dr. Woody Mathis for primary care.  He has a past medical history significant for paroxysmal atrial fibrillation, diabetes mellitus, B-cell lymphoma, hypothyroidism, hyperlipidemia, and hypertension.  He presented to the [facility] emergency department on 11/9/2019 with word finding difficulty and which started at home.  In the emergency department, chest x-ray showed no acute changes.  Urinalysis was not indicative of infection.  EKG showed no acute ST-T wave changes.  CT of the head was negative.  The patient was admitted to the hospitalist service for further evaluation and management.    The patient underwent work-up for stroke and was seen in consultation by neurology.  MRI showed atrophy of the brain and small vessel ischemic changes without acute ischemic changes or infarct.  Transthoracic echocardiogram showed left ventricular diastolic dysfunction with ejection fraction of 60%.  No evidence of pulmonary hypertension was present.  Preliminary carotid ultrasound shows less than 50% stenosis bilaterally, final report is pending.  The patient can follow-up with neurology if needed, with the most likely his symptoms are metabolic in nature.    The patient did have atrial fibrillation with rapid ventricular response, which responded well to his home medication Lopressor.  He did have a mildly elevated  "troponin at 0.07, which has shows a downward trend.  He has no complaints of chest pain.  He does have some shortness of breath, which could likely be attributable to Atrial fibrillation and general deconditioning.  Lopressor will be increased at time of discharge.  Digoxin dose will stay as is.    Overall, the patient is hemodynamically stable and appropriate for discharge home with home health services today.  We have discussed the importance of nutrition and have discussed various supplements as well.  Patient will follow-up with his primary care provider in 1 week.    Condition on Discharge:  Stable    Physical Exam on Discharge:  /55 (BP Location: Left arm, Patient Position: Lying)   Pulse 104   Temp 98 °F (36.7 °C) (Oral)   Resp 16   Ht 188 cm (74.02\")   Wt 71.6 kg (157 lb 13.6 oz)   SpO2 95%   BMI 20.26 kg/m²    Physical Exam   Constitutional: He is oriented to person, place, and time. He appears well-developed. He appears ill. No distress.   HENT:   Head: Normocephalic and atraumatic.   Neck: Normal range of motion. Neck supple. No JVD present. No tracheal deviation present.   Cardiovascular: An irregularly irregular rhythm present. Exam reveals no gallop and no friction rub.   A. fib 109-126    Pulmonary/Chest: Effort normal. He has no wheezes. He has no rales.   Abdominal: Soft. Bowel sounds are normal. He exhibits no distension. There is no tenderness. There is no guarding.   Musculoskeletal: Normal range of motion. He exhibits no edema or tenderness.   Neurological: He is alert and oriented to person, place, and time. No cranial nerve deficit.   Skin: Skin is warm and dry. No rash noted. No erythema.   Psychiatric: He has a normal mood and affect. His behavior is normal. Judgment and thought content normal.   Vitals reviewed.    Discharge Disposition:  [place] [facility] [place]    [place]charge Medications:     Discharge Medications      Changes to Medications      Instructions Start Date "   furosemide 20 MG tablet  Commonly known as:  LASIX  What changed:  how much to take   20 mg, Oral, Daily      metFORMIN 1000 MG tablet  Commonly known as:  GLUCOPHAGE  What changed:  additional instructions   1,000 mg, Oral, 2 Times Daily With Meals, Hold this medication for 48 hours      metoprolol tartrate 25 MG tablet  Commonly known as:  LOPRESSOR  What changed:  how much to take   25 mg, Oral, Every 12 Hours Scheduled         Continue These Medications      Instructions Start Date   allopurinol 300 MG tablet  Commonly known as:  ZYLOPRIM   300 mg, Oral, Daily      apixaban 5 MG tablet tablet  Commonly known as:  ELIQUIS   5 mg, Oral, Every 12 Hours Scheduled      CLARITIN 10 MG tablet  Generic drug:  loratadine   10 mg, Oral, Daily      digoxin 125 MCG tablet  Commonly known as:  LANOXIN   125 mcg, Oral, Daily Digoxin      finasteride 5 MG tablet  Commonly known as:  PROSCAR   5 mg, Oral, Daily      HYDROcodone-acetaminophen 7.5-325 MG per tablet  Commonly known as:  NORCO   1 tablet, Oral, Every 6 Hours PRN      JARDIANCE 10 MG tablet  Generic drug:  Empagliflozin   10 mg, Oral, Daily      Lidocaine-Prilocaine (Bulk) 2.5-2.5 % cream   1 application, Topical, As Needed      megestrol 40 MG/ML suspension  Commonly known as:  MEGACE   400 mg, Oral, Daily      melatonin 5 MG tablet tablet   5 mg, Oral, Nightly      pantoprazole 40 MG EC tablet  Commonly known as:  PROTONIX   40 mg, Oral, 2 Times Daily      potassium chloride 20 MEQ CR tablet  Commonly known as:  K-DUR,KLOR-CON   20 mEq, Oral, Daily      traZODone 50 MG tablet  Commonly known as:  DESYREL   50 mg, Oral, Nightly      vitamin b complex capsule capsule   1 capsule, Oral, Daily           Discharge Diet:   Diet Instructions     Diet: Regular; Thin      Discharge Diet:  Regular    Fluid Consistency:  Thin        Activity at Discharge:   Activity Instructions     Activity as Tolerated          Discharge Care Plan/Instructions:   1.   Return for any  acute or worsening symptoms  2.  Home health for skilled nursing, physical, and occupational therapy  3.  Increased Lopressor to 25 mg   4.  Hold metformin for 48 hours following contrasted study  5.  Home health to draw a BMP on Wednesday with results to PCP    Follow-up Appointments:   1.  Primary care provider in 1 week  Future Appointments   Date Time Provider Department Center   2019  2:00 PM [facility]     Test Results Pending at Discharge: Final carotid ultrasound is pending.    DERREK Richard  11/10/19  3:39 PM    Time: 35 minutes          Electronically signed by Trinh Paul APRN at 11/10/19 [address] [place] [id number]  Phone:  [phone]  Fax:   Date: Nov 10, 2019      Referral to Home Health     Patient:  Aram Nunez MRN:  [id number]   [address] [facility] [address] :  1940  SSN:    Phone: [phone] Sex:  M      INSURANCE PAYOR PLAN GROUP # SUBSCRIBER ID   Primary:  Secondary:    MEDICARE FARM [employer] PLANS [facility] [id number]  [id number]    [id number]  [id number]      Referring Provider Information:  TRINH PAUL Phone: [phone] Fax:       Referral Information:   # Visits:  1 Referral Type: Home Health [42]   Urgency:  Routine Referral Reason: Specialty Services Required   Start Date: Nov 10, 2019 End Date:  To be determined by Insurer   Diagnosis: Paroxysmal atrial fibrillation with rapid ventricular response (CMS/HCC) (I48.0 [ICD-10-CM] 427.31 [ICD-9-CM])  Weakness (R53.1 [ICD-10-CM] 780.79 [ICD-9-CM])  Impaired mobility and activities of daily living (Z74.09 [ICD-10-CM] 799.89 [ICD-9-CM])  Aphasia (R47.01 [ICD-10-CM] 784.3 [ICD-9-CM])      Refer to Dept:   Refer to Provider:   Refer to Facility:       Face to Face Visit Date: 11/10/2019  Follow-up provider for Plan of Care? I treated the patient in an acute care facility and will not  continue treatment after discharge.  Follow-up provider: EVER [facility]LE [[id number]]  Reason/Clinical Findings: Aphasia  Describe mobility limitations that make leaving home difficult: Generalized weakness, deconditioning  Nursing/Therapeutic Services Requested: Skilled Nursing  Nursing/Therapeutic Services Requested: Physical Therapy  Nursing/Therapeutic Services Requested: Occupational Therapy  Skilled nursing orders: Medication education (Please draw a BMP on Wednesday with results to PCP)  Skilled nursing orders: Cardiopulmonary assessments  PT orders: Total joint pathway  PT orders: Strengthening  PT orders: Home safety assessment  PT orders: Therapeutic exercise  Occupational orders: Activities of daily living  Occupational orders: Energy conservation  Occupational orders: Strengthening  Occupational orders: Home safety assessment  Frequency: 1 Week 1     This document serves as a request of services and does not constitute Insurance authorization or approval of services.  To determine eligibility, please contact the members Insurance carrier to verify and review coverage.     If you have medical questions regarding this request for services. Please contact [address] at [phone] during normal business hours.       Authorizing Provider:Trinh Paul APRN  Authorizing Provider's NPI: [id number]  Order Entered By: Trinh Paul APRN 11/10/2019  3:26 PM     Electronically signed by: Trinh Paul APRN 11/10/2019  3:26 PM

## 2019-11-11 NOTE — PROGRESS NOTES
Continued Stay Note   [facility]     Patient Name: Aram Nunez  MRN: [id number]  Today's Date: 11/11/2019    Admit Date: 11/9/2019    Discharge Plan     Row Name 11/11/19 0842       Plan    Final Discharge Disposition Code  06 - home with home health care    Final Note  Pt was discharged home with HH orders. Pt has chosen [facility] from provider list. SW has faxed all information and spoke with Mireya from [facility] who is aware of referral.          Discharge Codes    No documentation.       Expected Discharge Date and Time     Expected Discharge Date Expected Discharge Time    Nov 10, 2019             Gillian Wrigth

## 2019-11-11 NOTE — OUTREACH NOTE
Prep Survey      Responses   Facility patient discharged from?  [facility]   Is patient eligible?  Yes   Discharge diagnosis  aphasia,  HTN,  DM,  disease of thyroid gland,  hyperlipidemia,  diffuse large B cell lymphoma,  elevated troponin,  persistent atrial fibrillation   Does the patient have one of the following disease processes/diagnoses(primary or secondary)?  Other   Does the patient have Home health ordered?  No   Is there a DME ordered?  No   Comments regarding appointments  call for apmt   Medication alerts for this patient  glucophage and lopressor   Prep survey completed?  Yes          Yaneth Key RN

## 2019-11-13 ENCOUNTER — READMISSION MANAGEMENT (OUTPATIENT)
Dept: CALL CENTER | Facility: HOSPITAL | Age: 79
End: 2019-11-13

## 2019-11-13 NOTE — OUTREACH NOTE
Medical Week 1 Survey      Responses   Facility patient discharged from?  [facility]   Does the patient have one of the following disease processes/diagnoses(primary or secondary)?  Other   Is there a successful TCM telephone encounter documented?  No   Week 1 attempt successful?  Yes   Call start time  0855   Call end time  0907   Discharge diagnosis  aphasia,  HTN,  DM,  disease of thyroid gland,  hyperlipidemia,  diffuse large B cell lymphoma,  elevated troponin,  persistent atrial fibrillation [She states things are not going well, he is depressed. States it was very traumatic, The room did not have heat. The chemo is totally running him and turned or world upside down.]   Is patient permission given to speak with other caregiver?  Yes   List who call center can speak with  wife    Person spoke with today (if not patient) and relationship  wife    Medication alerts for this patient  glucophage and lopressor   Meds reviewed with patient/caregiver?  Yes   Is the patient having any side effects they believe may be caused by any medication additions or changes?  No   Does the patient have all medications ordered at discharge?  Yes   Is the patient taking all medications as directed (includes completed medication regime)?  Yes   Comments regarding appointments  call for apmt   Does the patient have a primary care provider?   Yes   Does the patient have an appointment with their PCP within 7 days of discharge?  N/A   Has the patient kept scheduled appointments due by today?  N/A   Comments  She will make an appt.    Has home health visited the patient within 72 hours of discharge?  No   Home health comments   The did try to come and they refused for them to come yesterday, and she is going to let them come today [facility]   Psychosocial issues?  Yes [HE is very depressed. ]   Nursing interventions  Notified family   Psychosocial comments  The family is aware.    Did the patient receive a copy of their discharge  instructions?  Yes   Nursing interventions  Reviewed instructions with patient   What is the patient's perception of their health status since discharge?  Same   Is the patient/caregiver able to teach back signs and symptoms related to disease process for when to call PCP?  Yes   Is the patient/caregiver able to teach back signs and symptoms related to disease process for when to call 911?  Yes   Is the patient/caregiver able to teach back the hierarchy of who to call/visit for symptoms/problems? PCP, Specialist, Home health nurse, Urgent Care, ED, 911  Yes   Week 1 call completed?  Yes          Perri Katz RN

## 2019-11-15 RX ORDER — EPINEPHRINE 1 MG/ML
0.3 INJECTION, SOLUTION, CONCENTRATE INTRAVENOUS PRN
Status: CANCELLED | OUTPATIENT
Start: 2019-11-18

## 2019-11-15 RX ORDER — SODIUM CHLORIDE 0.9 % (FLUSH) 0.9 %
10 SYRINGE (ML) INJECTION PRN
Status: CANCELLED | OUTPATIENT
Start: 2019-11-19

## 2019-11-15 RX ORDER — DIPHENHYDRAMINE HYDROCHLORIDE 50 MG/ML
50 INJECTION INTRAMUSCULAR; INTRAVENOUS PRN
Status: CANCELLED | OUTPATIENT
Start: 2019-11-19

## 2019-11-15 RX ORDER — DIPHENHYDRAMINE HYDROCHLORIDE 50 MG/ML
25 INJECTION INTRAMUSCULAR; INTRAVENOUS ONCE
Status: CANCELLED | OUTPATIENT
Start: 2019-11-18

## 2019-11-15 RX ORDER — EPINEPHRINE 1 MG/ML
0.3 INJECTION, SOLUTION, CONCENTRATE INTRAVENOUS PRN
Status: CANCELLED | OUTPATIENT
Start: 2019-11-19

## 2019-11-15 RX ORDER — HEPARIN SODIUM (PORCINE) LOCK FLUSH IV SOLN 100 UNIT/ML 100 UNIT/ML
500 SOLUTION INTRAVENOUS PRN
Status: CANCELLED | OUTPATIENT
Start: 2019-11-18

## 2019-11-15 RX ORDER — SODIUM CHLORIDE 9 MG/ML
20 INJECTION, SOLUTION INTRAVENOUS ONCE
Status: CANCELLED | OUTPATIENT
Start: 2019-11-18

## 2019-11-15 RX ORDER — PALONOSETRON 0.05 MG/ML
0.25 INJECTION, SOLUTION INTRAVENOUS ONCE
Status: CANCELLED
Start: 2019-11-18

## 2019-11-15 RX ORDER — SODIUM CHLORIDE 0.9 % (FLUSH) 0.9 %
5 SYRINGE (ML) INJECTION PRN
Status: CANCELLED | OUTPATIENT
Start: 2019-11-18

## 2019-11-15 RX ORDER — SODIUM CHLORIDE 9 MG/ML
20 INJECTION, SOLUTION INTRAVENOUS ONCE
Status: CANCELLED | OUTPATIENT
Start: 2019-11-19

## 2019-11-15 RX ORDER — METHYLPREDNISOLONE SODIUM SUCCINATE 125 MG/2ML
125 INJECTION, POWDER, LYOPHILIZED, FOR SOLUTION INTRAMUSCULAR; INTRAVENOUS PRN
Status: CANCELLED | OUTPATIENT
Start: 2019-11-18

## 2019-11-15 RX ORDER — DIPHENHYDRAMINE HYDROCHLORIDE 50 MG/ML
50 INJECTION INTRAMUSCULAR; INTRAVENOUS PRN
Status: CANCELLED | OUTPATIENT
Start: 2019-11-18

## 2019-11-15 RX ORDER — SODIUM CHLORIDE 0.9 % (FLUSH) 0.9 %
5 SYRINGE (ML) INJECTION PRN
Status: CANCELLED | OUTPATIENT
Start: 2019-11-19

## 2019-11-15 RX ORDER — METHYLPREDNISOLONE SODIUM SUCCINATE 125 MG/2ML
125 INJECTION, POWDER, LYOPHILIZED, FOR SOLUTION INTRAMUSCULAR; INTRAVENOUS PRN
Status: CANCELLED | OUTPATIENT
Start: 2019-11-19

## 2019-11-15 RX ORDER — SODIUM CHLORIDE 0.9 % (FLUSH) 0.9 %
10 SYRINGE (ML) INJECTION PRN
Status: CANCELLED | OUTPATIENT
Start: 2019-11-18

## 2019-11-15 RX ORDER — HEPARIN SODIUM (PORCINE) LOCK FLUSH IV SOLN 100 UNIT/ML 100 UNIT/ML
500 SOLUTION INTRAVENOUS PRN
Status: CANCELLED | OUTPATIENT
Start: 2019-11-19

## 2019-11-15 RX ORDER — PALONOSETRON 0.05 MG/ML
0.25 INJECTION, SOLUTION INTRAVENOUS ONCE
Status: CANCELLED | OUTPATIENT
Start: 2019-11-18

## 2019-11-15 RX ORDER — ACETAMINOPHEN 325 MG/1
1000 TABLET ORAL ONCE
Status: CANCELLED | OUTPATIENT
Start: 2019-11-18

## 2019-11-18 ENCOUNTER — OFFICE VISIT (OUTPATIENT)
Dept: HEMATOLOGY | Age: 79
End: 2019-11-18
Payer: MEDICARE

## 2019-11-18 ENCOUNTER — HOSPITAL ENCOUNTER (OUTPATIENT)
Dept: GENERAL RADIOLOGY | Age: 79
Discharge: HOME | End: 2019-11-18
Payer: MEDICARE

## 2019-11-18 ENCOUNTER — HOSPITAL ENCOUNTER (OUTPATIENT)
Dept: INFUSION THERAPY | Age: 79
Discharge: HOME | End: 2019-11-18
Payer: MEDICARE

## 2019-11-18 VITALS
HEART RATE: 51 BPM | HEIGHT: 74 IN | WEIGHT: 153 LBS | SYSTOLIC BLOOD PRESSURE: 90 MMHG | BODY MASS INDEX: 19.64 KG/M2 | DIASTOLIC BLOOD PRESSURE: 60 MMHG | TEMPERATURE: 99.4 F | OXYGEN SATURATION: 89 %

## 2019-11-18 DIAGNOSIS — R06.02 SOB (SHORTNESS OF BREATH): ICD-10-CM

## 2019-11-18 DIAGNOSIS — D70.1 CHEMOTHERAPY-INDUCED NEUTROPENIA (HCC): ICD-10-CM

## 2019-11-18 DIAGNOSIS — T50.905S TOXICITY, LATE EFFECT, DUE TO DRUG, MEDICINE, OR BIOLOGICAL SUBSTANCE: ICD-10-CM

## 2019-11-18 DIAGNOSIS — C83.39 DIFFUSE LARGE B-CELL LYMPHOMA, EXTRANODAL AND SOLID ORGAN SITES (HCC): ICD-10-CM

## 2019-11-18 DIAGNOSIS — Z51.12 ENCOUNTER FOR ANTINEOPLASTIC IMMUNOTHERAPY: ICD-10-CM

## 2019-11-18 DIAGNOSIS — Z71.89 CARE PLAN DISCUSSED WITH PATIENT: ICD-10-CM

## 2019-11-18 DIAGNOSIS — C85.90 NON-HODGKIN'S LYMPHOMA, UNSPECIFIED BODY REGION, UNSPECIFIED NON-HODGKIN LYMPHOMA TYPE (HCC): Primary | ICD-10-CM

## 2019-11-18 DIAGNOSIS — C83.39 DIFFUSE LARGE B-CELL LYMPHOMA, EXTRANODAL AND SOLID ORGAN SITES (HCC): Primary | ICD-10-CM

## 2019-11-18 DIAGNOSIS — T45.1X5A CHEMOTHERAPY-INDUCED NEUTROPENIA (HCC): ICD-10-CM

## 2019-11-18 DIAGNOSIS — R25.2 MUSCLE CRAMP: ICD-10-CM

## 2019-11-18 DIAGNOSIS — G89.3 CANCER RELATED PAIN: Primary | ICD-10-CM

## 2019-11-18 DIAGNOSIS — Z51.11 CHEMOTHERAPY MANAGEMENT, ENCOUNTER FOR: ICD-10-CM

## 2019-11-18 PROCEDURE — 2500000003 HC RX 250 WO HCPCS: Performed by: INTERNAL MEDICINE

## 2019-11-18 PROCEDURE — 96417 CHEMO IV INFUS EACH ADDL SEQ: CPT

## 2019-11-18 PROCEDURE — 99214 OFFICE O/P EST MOD 30 MIN: CPT | Performed by: INTERNAL MEDICINE

## 2019-11-18 PROCEDURE — 4040F PNEUMOC VAC/ADMIN/RCVD: CPT | Performed by: INTERNAL MEDICINE

## 2019-11-18 PROCEDURE — 80053 COMPREHEN METABOLIC PANEL: CPT

## 2019-11-18 PROCEDURE — G8484 FLU IMMUNIZE NO ADMIN: HCPCS | Performed by: INTERNAL MEDICINE

## 2019-11-18 PROCEDURE — 1036F TOBACCO NON-USER: CPT | Performed by: INTERNAL MEDICINE

## 2019-11-18 PROCEDURE — G8420 CALC BMI NORM PARAMETERS: HCPCS | Performed by: INTERNAL MEDICINE

## 2019-11-18 PROCEDURE — 6370000000 HC RX 637 (ALT 250 FOR IP): Performed by: INTERNAL MEDICINE

## 2019-11-18 PROCEDURE — 96375 TX/PRO/DX INJ NEW DRUG ADDON: CPT

## 2019-11-18 PROCEDURE — 1123F ACP DISCUSS/DSCN MKR DOCD: CPT | Performed by: INTERNAL MEDICINE

## 2019-11-18 PROCEDURE — 6360000002 HC RX W HCPCS: Performed by: INTERNAL MEDICINE

## 2019-11-18 PROCEDURE — 83735 ASSAY OF MAGNESIUM: CPT

## 2019-11-18 PROCEDURE — 71046 X-RAY EXAM CHEST 2 VIEWS: CPT

## 2019-11-18 PROCEDURE — 2580000003 HC RX 258: Performed by: INTERNAL MEDICINE

## 2019-11-18 PROCEDURE — 96415 CHEMO IV INFUSION ADDL HR: CPT

## 2019-11-18 PROCEDURE — 96413 CHEMO IV INFUSION 1 HR: CPT

## 2019-11-18 PROCEDURE — G8427 DOCREV CUR MEDS BY ELIG CLIN: HCPCS | Performed by: INTERNAL MEDICINE

## 2019-11-18 PROCEDURE — 96411 CHEMO IV PUSH ADDL DRUG: CPT

## 2019-11-18 PROCEDURE — 85025 COMPLETE CBC W/AUTO DIFF WBC: CPT

## 2019-11-18 PROCEDURE — 84100 ASSAY OF PHOSPHORUS: CPT

## 2019-11-18 RX ORDER — SODIUM CHLORIDE 0.9 % (FLUSH) 0.9 %
10 SYRINGE (ML) INJECTION PRN
Status: DISCONTINUED | OUTPATIENT
Start: 2019-11-18 | End: 2019-11-19 | Stop reason: HOSPADM

## 2019-11-18 RX ORDER — DEXAMETHASONE SODIUM PHOSPHATE 10 MG/ML
10 INJECTION, SOLUTION INTRAMUSCULAR; INTRAVENOUS ONCE
Status: COMPLETED | OUTPATIENT
Start: 2019-11-18 | End: 2019-11-18

## 2019-11-18 RX ORDER — HYDROCODONE BITARTRATE AND ACETAMINOPHEN 7.5; 325 MG/1; MG/1
1 TABLET ORAL EVERY 4 HOURS PRN
Qty: 120 TABLET | Refills: 0 | Status: SHIPPED | OUTPATIENT
Start: 2019-11-18 | End: 2019-12-18

## 2019-11-18 RX ORDER — PALONOSETRON 0.05 MG/ML
0.25 INJECTION, SOLUTION INTRAVENOUS ONCE
Status: COMPLETED | OUTPATIENT
Start: 2019-11-18 | End: 2019-11-18

## 2019-11-18 RX ORDER — ACETAMINOPHEN 500 MG
1000 TABLET ORAL ONCE
Status: COMPLETED | OUTPATIENT
Start: 2019-11-18 | End: 2019-11-18

## 2019-11-18 RX ORDER — DIPHENHYDRAMINE HYDROCHLORIDE 50 MG/ML
25 INJECTION INTRAMUSCULAR; INTRAVENOUS ONCE
Status: COMPLETED | OUTPATIENT
Start: 2019-11-18 | End: 2019-11-18

## 2019-11-18 RX ORDER — HYDROCODONE BITARTRATE AND ACETAMINOPHEN 7.5; 325 MG/1; MG/1
1 TABLET ORAL EVERY 4 HOURS PRN
Qty: 120 TABLET | OUTPATIENT
Start: 2019-11-18 | End: 2019-12-18

## 2019-11-18 RX ADMIN — ACETAMINOPHEN 1000 MG: 500 TABLET ORAL at 13:57

## 2019-11-18 RX ADMIN — HEPARIN 500 UNITS: 100 SYRINGE at 17:08

## 2019-11-18 RX ADMIN — Medication 10 ML: at 17:08

## 2019-11-18 RX ADMIN — DEXAMETHASONE SODIUM PHOSPHATE 10 MG: 10 INJECTION, SOLUTION INTRAMUSCULAR; INTRAVENOUS at 13:55

## 2019-11-18 RX ADMIN — RITUXIMAB 740 MG: 10 INJECTION, SOLUTION INTRAVENOUS at 14:39

## 2019-11-18 RX ADMIN — PALONOSETRON 0.25 MG: 0.05 INJECTION, SOLUTION INTRAVENOUS at 13:52

## 2019-11-18 RX ADMIN — BENDAMUSTINE HYDROCHLORIDE 118.2 MG: 25 INJECTION, SOLUTION INTRAVENOUS at 16:23

## 2019-11-18 RX ADMIN — POLATUZUMAB VEDOTIN 129 MG: 140 INJECTION, POWDER, LYOPHILIZED, FOR SOLUTION INTRAVENOUS at 16:38

## 2019-11-18 RX ADMIN — DIPHENHYDRAMINE HYDROCHLORIDE 25 MG: 50 INJECTION, SOLUTION INTRAMUSCULAR; INTRAVENOUS at 13:50

## 2019-11-19 ENCOUNTER — TRANSCRIBE ORDERS (OUTPATIENT)
Dept: ADMINISTRATIVE | Facility: HOSPITAL | Age: 79
End: 2019-11-19

## 2019-11-19 ENCOUNTER — HOSPITAL ENCOUNTER (OUTPATIENT)
Dept: INFUSION THERAPY | Age: 79
Discharge: HOME | End: 2019-11-19
Payer: MEDICARE

## 2019-11-19 VITALS — SYSTOLIC BLOOD PRESSURE: 110 MMHG | DIASTOLIC BLOOD PRESSURE: 60 MMHG

## 2019-11-19 DIAGNOSIS — D70.1 CHEMOTHERAPY-INDUCED NEUTROPENIA (HCC): Primary | ICD-10-CM

## 2019-11-19 DIAGNOSIS — C83.39 DIFFUSE LARGE B-CELL LYMPHOMA OF EXTRANODAL SITE (HCC): Primary | ICD-10-CM

## 2019-11-19 DIAGNOSIS — C85.90 NON-HODGKIN'S LYMPHOMA, UNSPECIFIED BODY REGION, UNSPECIFIED NON-HODGKIN LYMPHOMA TYPE (HCC): ICD-10-CM

## 2019-11-19 DIAGNOSIS — T45.1X5A CHEMOTHERAPY-INDUCED NEUTROPENIA (HCC): Primary | ICD-10-CM

## 2019-11-19 DIAGNOSIS — C83.39 DIFFUSE LARGE B-CELL LYMPHOMA, EXTRANODAL AND SOLID ORGAN SITES (HCC): ICD-10-CM

## 2019-11-19 PROCEDURE — 2580000003 HC RX 258: Performed by: NURSE PRACTITIONER

## 2019-11-19 PROCEDURE — 2580000003 HC RX 258: Performed by: INTERNAL MEDICINE

## 2019-11-19 PROCEDURE — 96413 CHEMO IV INFUSION 1 HR: CPT

## 2019-11-19 PROCEDURE — 96368 THER/DIAG CONCURRENT INF: CPT

## 2019-11-19 PROCEDURE — 6360000002 HC RX W HCPCS: Performed by: NURSE PRACTITIONER

## 2019-11-19 PROCEDURE — 96366 THER/PROPH/DIAG IV INF ADDON: CPT

## 2019-11-19 PROCEDURE — 6360000002 HC RX W HCPCS: Performed by: INTERNAL MEDICINE

## 2019-11-19 PROCEDURE — 96377 APPLICATON ON-BODY INJECTOR: CPT

## 2019-11-19 RX ORDER — SODIUM CHLORIDE 0.9 % (FLUSH) 0.9 %
10 SYRINGE (ML) INJECTION PRN
Status: DISCONTINUED | OUTPATIENT
Start: 2019-11-19 | End: 2019-11-20 | Stop reason: HOSPADM

## 2019-11-19 RX ADMIN — PEGFILGRASTIM 6 MG: KIT SUBCUTANEOUS at 13:30

## 2019-11-19 RX ADMIN — Medication 10 ML: at 13:28

## 2019-11-19 RX ADMIN — DEXAMETHASONE SODIUM PHOSPHATE: 10 INJECTION, SOLUTION INTRAMUSCULAR; INTRAVENOUS at 12:41

## 2019-11-19 RX ADMIN — BENDAMUSTINE HYDROCHLORIDE 118.2 MG: 25 INJECTION, SOLUTION INTRAVENOUS at 13:08

## 2019-11-19 RX ADMIN — Medication 500 UNITS: at 13:28

## 2019-11-20 ENCOUNTER — READMISSION MANAGEMENT (OUTPATIENT)
Dept: CALL CENTER | Facility: HOSPITAL | Age: 79
End: 2019-11-20

## 2019-11-20 NOTE — OUTREACH NOTE
Medical Week 2 Survey      Responses   Facility patient discharged from?  [facility]   Does the patient have one of the following disease processes/diagnoses(primary or secondary)?  Other   Week 2 attempt successful?  Yes   Call start time  1340   Meds reviewed with patient/caregiver?  Yes   Is the patient taking all medications as directed (includes completed medication regime)?  Yes   Comments regarding appointments  Pt has multiple appointments including chemo.   Has the patient kept scheduled appointments due by today?  Yes   Week 2 Call Completed?  Yes          Marta Coe RN

## 2019-11-27 ENCOUNTER — READMISSION MANAGEMENT (OUTPATIENT)
Dept: CALL CENTER | Facility: HOSPITAL | Age: 79
End: 2019-11-27

## 2019-11-27 NOTE — OUTREACH NOTE
Medical Week 3 Survey      Responses   Facility patient discharged from?  [facility]   Does the patient have one of the following disease processes/diagnoses(primary or secondary)?  Other   Week 3 attempt successful?  Yes   Call start time  1636   Call end time  1640   Discharge diagnosis  aphasia,  HTN,  DM,  disease of thyroid gland,  hyperlipidemia,  diffuse large B cell lymphoma,  elevated troponin,  persistent atrial fibrillation   Meds reviewed with patient/caregiver?  Yes   Is the patient having any side effects they believe may be caused by any medication additions or changes?  No   Does the patient have all medications ordered at discharge?  Yes   Is the patient taking all medications as directed (includes completed medication regime)?  Yes   Does the patient have a primary care provider?   Yes   Does the patient have an appointment with their PCP within 7 days of discharge?  Yes   Has the patient kept scheduled appointments due by today?  Yes   Has home health visited the patient within 72 hours of discharge?  N/A   Did the patient receive a copy of their discharge instructions?  Yes   Nursing interventions  Reviewed instructions with patient   What is the patient's perception of their health status since discharge?  Improving   Is the patient/caregiver able to teach back signs and symptoms related to disease process for when to call PCP?  Yes   Is the patient/caregiver able to teach back signs and symptoms related to disease process for when to call 911?  Yes   Is the patient/caregiver able to teach back the hierarchy of who to call/visit for symptoms/problems? PCP, Specialist, Home health nurse, Urgent Care, ED, 911  Yes   Week 3 Call Completed?  Yes          Toribio Quezada RN

## 2019-12-04 ENCOUNTER — READMISSION MANAGEMENT (OUTPATIENT)
Dept: CALL CENTER | Facility: HOSPITAL | Age: 79
End: 2019-12-04

## 2019-12-04 NOTE — OUTREACH NOTE
Medical Week 4 Survey      Responses   Facility patient discharged from?  [facility]   Does the patient have one of the following disease processes/diagnoses(primary or secondary)?  Other   Week 4 attempt successful?  Yes   Call start time  1533   Call end time  1538   Discharge diagnosis  aphasia,  HTN,  DM,  disease of thyroid gland,  hyperlipidemia,  diffuse large B cell lymphoma,  elevated troponin,  persistent atrial fibrillation   Person spoke with today (if not patient) and relationship  Gina-spouse   Meds reviewed with patient/caregiver?  Yes   Is the patient taking all medications as directed (includes completed medication regime)?  Yes   Has the patient kept scheduled appointments due by today?  Yes   Is the patient still receiving Home Health Services?  Yes   Home health comments  Pt started HH last week   Psychosocial issues?  No   What is the patient's perception of their health status since discharge?  Improving   Additional teach back comments  Pt have a scan 12/5/19. The scan will give more detailed information on tumor growth. Family is very anxious. Unsure if patient will be able to handle more chemo. Family will find out on Monday about future of patient.    Week 4 Call Completed?  Yes   Would the patient like one additional call?  Yes          Radha Addison RN

## 2019-12-05 ENCOUNTER — HOSPITAL ENCOUNTER (OUTPATIENT)
Dept: CT IMAGING | Facility: HOSPITAL | Age: 79
Discharge: HOME | End: 2019-12-05
Admitting: INTERNAL MEDICINE

## 2019-12-05 DIAGNOSIS — C83.39 DIFFUSE LARGE B-CELL LYMPHOMA OF EXTRANODAL SITE (HCC): ICD-10-CM

## 2019-12-05 PROCEDURE — 71260 CT THORAX DX C+: CPT

## 2019-12-05 PROCEDURE — 25010000002 IOPAMIDOL 61 % SOLUTION: Performed by: INTERNAL MEDICINE

## 2019-12-05 PROCEDURE — 82565 ASSAY OF CREATININE: CPT

## 2019-12-05 PROCEDURE — 74177 CT ABD & PELVIS W/CONTRAST: CPT

## 2019-12-05 RX ADMIN — IOPAMIDOL 50 ML: 612 INJECTION, SOLUTION INTRAVENOUS at 12:12

## 2019-12-05 RX ADMIN — IOPAMIDOL 100 ML: 612 INJECTION, SOLUTION INTRAVENOUS at 12:12

## 2019-12-06 ENCOUNTER — OFFICE VISIT (OUTPATIENT)
Dept: CARDIOLOGY | Facility: CLINIC | Age: 79
End: 2019-12-06

## 2019-12-06 VITALS
WEIGHT: 150 LBS | OXYGEN SATURATION: 96 % | HEART RATE: 93 BPM | DIASTOLIC BLOOD PRESSURE: 62 MMHG | HEIGHT: 74 IN | BODY MASS INDEX: 19.25 KG/M2 | SYSTOLIC BLOOD PRESSURE: 132 MMHG

## 2019-12-06 DIAGNOSIS — I48.21 PERMANENT ATRIAL FIBRILLATION (HCC): Primary | ICD-10-CM

## 2019-12-06 DIAGNOSIS — G89.3 CANCER RELATED PAIN: ICD-10-CM

## 2019-12-06 DIAGNOSIS — Z79.01 CURRENT USE OF LONG TERM ANTICOAGULATION: ICD-10-CM

## 2019-12-06 DIAGNOSIS — I50.22 CHRONIC SYSTOLIC HEART FAILURE (HCC): ICD-10-CM

## 2019-12-06 LAB — CREAT BLDA-MCNC: 1 MG/DL (ref 0.6–1.3)

## 2019-12-06 PROCEDURE — 99214 OFFICE O/P EST MOD 30 MIN: CPT | Performed by: NURSE PRACTITIONER

## 2019-12-06 PROCEDURE — 93000 ELECTROCARDIOGRAM COMPLETE: CPT | Performed by: NURSE PRACTITIONER

## 2019-12-06 NOTE — PROGRESS NOTES
Subjective:     Encounter Date:12/6/2019      Patient ID: Aram Nunez is a 79 y.o. male with a history of chronic systolic/diastolic heart failure, atrial fibrillation, long term anticoagulation with Eliquis, non-Hodgkins lymphoma, chronic anemia, and DM. He presents today for follow up.     Chief Complaint: Follow-up  Atrial Fibrillation   Presents for follow-up visit. Symptoms include chest pain (intermittent tightness to left side) and weakness. Symptoms are negative for bradycardia, dizziness, hemodynamic instability, hypertension, hypotension, palpitations, shortness of breath, syncope and tachycardia. The symptoms have been stable. Past medical history includes atrial fibrillation. There are no medication compliance problems.      Mr. Nunez presents the office today for follow-up.  He was recently evaluated in the hospital by the hospitalist team and neurology due to complaints of altered mental status.  CT MRI of the head were negative for acute findings.  Neurology was considering side effects from his new chemotherapy regimen as the causing factor.  He still struggles with this intermittently at home but it is improved from his hospitalization.  He remains in atrial fibrillation with controlled rates.  He remains anticoagulated without any signs or symptoms of bleeding.  He is following with Dr. Walton.  He denies any signs or symptoms of heart failure.  He continues to struggle with weight loss due to poor appetite.  He recently saw his primary care physician and was noted to have an elevated hemoglobin A1c and Jardiance was added to his medication regimen.  He complains of overall weakness.     The following portions of the patient's history were reviewed and updated as appropriate: allergies, current medications, past family history, past medical history, past social history and past surgical history.     No Known Allergies      Current Outpatient Medications:   •  allopurinol (ZYLOPRIM)  300 MG tablet, Take 150 mg by mouth Daily., Disp: , Rfl:   •  apixaban (ELIQUIS) 5 MG tablet tablet, Take 1 tablet by mouth Every 12 (Twelve) Hours., Disp: 60 tablet, Rfl: 11  •  B Complex Vitamins (VITAMIN B COMPLEX) capsule capsule, Take 1 capsule by mouth Daily., Disp: , Rfl:   •  digoxin (LANOXIN) 125 MCG tablet, Take 1 tablet by mouth Daily., Disp: 30 tablet, Rfl: 11  •  Empagliflozin (JARDIANCE) 10 MG tablet, Take 10 mg by mouth Daily., Disp: , Rfl:   •  finasteride (PROSCAR) 5 MG tablet, Take 5 mg by mouth Daily., Disp: , Rfl:   •  HYDROcodone-acetaminophen (NORCO) 7.5-325 MG per tablet, Take 1 tablet by mouth Every 6 (Six) Hours As Needed for Moderate Pain ., Disp: , Rfl:   •  Lidocaine-Prilocaine, Bulk, 2.5-2.5 % cream, Apply 1 application topically to the appropriate area as directed As Needed (APPLY TO AREA OF PORT 30 MIN- 1 HR PRIOR TO PORT ACCESS)., Disp: , Rfl:   •  loratadine (CLARITIN) 10 MG tablet, Take 10 mg by mouth Daily., Disp: , Rfl:   •  megestrol (MEGACE) 40 MG/ML suspension, Take 400 mg by mouth Daily., Disp: , Rfl:   •  melatonin 5 MG tablet tablet, Take 5 mg by mouth Every Night., Disp: , Rfl:   •  metFORMIN (GLUCOPHAGE) 1000 MG tablet, Take 1 tablet by mouth 2 (Two) Times a Day With Meals. Hold this medication for 48 hours, Disp: , Rfl:   •  metoprolol tartrate (LOPRESSOR) 25 MG tablet, Take 1 tablet by mouth Every 12 (Twelve) Hours., Disp: 60 tablet, Rfl: 2  •  pantoprazole (PROTONIX) 40 MG EC tablet, Take 40 mg by mouth 2 (Two) Times a Day., Disp: , Rfl: ]    Past Medical History:   Diagnosis Date   • A-fib (CMS/HCC)    • Arthritis    • Cancer (CMS/HCC)     skin   • Diabetes mellitus (CMS/HCC)     borderline, no medication   • Disease of thyroid gland    • Dysrhythmia    • HL (hearing loss)    • Hyperlipidemia    • Hypertension    • IBS (irritable bowel syndrome)    • Lung mass 12/24/2018   • Neuropathy    • Non Hodgkin's lymphoma (CMS/HCC)      Family History   Problem Relation Age of  "Onset   • Heart disease Mother    • Cancer Father    • Heart disease Brother      Social History     Socioeconomic History   • Marital status:      Spouse name: Not on file   • Number of children: Not on file   • Years of education: Not on file   • Highest education level: Not on file   Tobacco Use   • Smoking status: Never Smoker   • Smokeless tobacco: Never Used   Substance and Sexual Activity   • Alcohol use: No   • Drug use: No   • Sexual activity: Defer     Past Surgical History:   Procedure Laterality Date   • CARDIAC CATHETERIZATION     • PARATHYROID GLAND SURGERY     • PROSTATE SURGERY      PROSTATECTOMY   • SKIN CANCER EXCISION       Review of Systems   Constitution: Positive for decreased appetite, weakness and weight loss. Negative for chills.   Cardiovascular: Positive for chest pain (intermittent tightness to left side). Negative for dyspnea on exertion, leg swelling, palpitations and syncope.   Respiratory: Negative for shortness of breath and wheezing.    Hematologic/Lymphatic: Negative for bleeding problem. Bruises/bleeds easily.   Skin: Negative for dry skin, flushing and itching.   Musculoskeletal: Positive for muscle weakness.   Gastrointestinal: Negative for bloating, nausea and vomiting.   Neurological: Negative for dizziness, focal weakness and light-headedness.   Psychiatric/Behavioral: Negative for altered mental status. The patient is not nervous/anxious.      Left sided chest pain - likely related to tumor     ECG 12 Lead  Date/Time: 12/6/2019 3:02 PM  Performed by: Kateryna Draper APRN  Authorized by: Kateryna Draper APRN   Comparison: compared with previous ECG from 11/10/2019  Similar to previous ECG  Rhythm: atrial fibrillation  Rate: normal  BPM: 94  QRS axis: normal  Other findings: non-specific ST-T wave changes    Clinical impression: abnormal EKG          /62   Pulse 93   Ht 188 cm (74.02\")   Wt 68 kg (150 lb)   SpO2 96%   BMI 19.25 kg/m²        Objective:     " Physical Exam   Constitutional: He is oriented to person, place, and time. He appears well-developed. He appears lethargic. He appears cachectic. He is cooperative. He appears ill. No distress.   HENT:   Head: Normocephalic and atraumatic.   Mouth/Throat: Oropharynx is clear and moist.   Eyes: Conjunctivae are normal. No scleral icterus.   Neck: Normal range of motion. Neck supple.   Cardiovascular: Normal rate and normal heart sounds. An irregularly irregular rhythm present.   Pulmonary/Chest: Effort normal and breath sounds normal. No respiratory distress. He has no decreased breath sounds. He has no wheezes. He has no rhonchi. He has no rales.   Abdominal: Soft. Normal appearance. He exhibits no distension. There is no tenderness.   Musculoskeletal: He exhibits no edema.   Neurological: He is oriented to person, place, and time. He appears lethargic.   Skin: Skin is warm, dry and intact. No rash noted. He is not diaphoretic. No erythema. No pallor.   Psychiatric: He has a normal mood and affect. His behavior is normal.       Lab Review: Previous hospitalization notes reviewed, previous office visit from cardiology reviewed.      Assessment:          Diagnosis Plan   1. Permanent atrial fibrillation     2. Current use of long term anticoagulation     3. Chronic systolic heart failure (CMS/HCC)     4. Cancer related pain            Plan:        - AF: Remains in atrial fibrillation at this time previously noted to be persistent.  No plans for pharmacological or electrical cardioversion.  Goal is rate control remains in A. fib on EKG today with heart rate of 93.  Considered permanent A. fib.  Anticoagulated.    - anticoagulation: remain on Eliquis for stroke risk reduction. He is on 5 mg BID. He will be 80 years old in January and his weights vary.  Monitor closely in future for dose change.  Continue 5 mg twice daily at this time.    - CHF: No signs and symptoms of congestive heart failure.  He denies any worsening  shortness of breath or swelling.  He has had no weight gain in fact he continues to have weight loss due to his poor appetite likely secondary from his chemotherapy.    -Cancer related pain: Has intermittent chest pain on the left side previously consistent with recurrence of tumor.  Atypical, no concerns for ischemic type pain.    Follow up with cardiology in 3 months.

## 2019-12-09 ENCOUNTER — OFFICE VISIT (OUTPATIENT)
Dept: HEMATOLOGY | Age: 79
End: 2019-12-09
Payer: MEDICARE

## 2019-12-09 ENCOUNTER — HOSPITAL ENCOUNTER (OUTPATIENT)
Dept: INFUSION THERAPY | Age: 79
Discharge: HOME | End: 2019-12-09
Payer: MEDICARE

## 2019-12-09 VITALS
DIASTOLIC BLOOD PRESSURE: 50 MMHG | OXYGEN SATURATION: 96 % | SYSTOLIC BLOOD PRESSURE: 96 MMHG | BODY MASS INDEX: 19.12 KG/M2 | HEIGHT: 74 IN | HEART RATE: 98 BPM | TEMPERATURE: 97.9 F | WEIGHT: 149 LBS

## 2019-12-09 DIAGNOSIS — C83.39 DIFFUSE LARGE B-CELL LYMPHOMA, EXTRANODAL AND SOLID ORGAN SITES (HCC): ICD-10-CM

## 2019-12-09 DIAGNOSIS — C83.39 DIFFUSE LARGE B-CELL LYMPHOMA, EXTRANODAL AND SOLID ORGAN SITES (HCC): Primary | ICD-10-CM

## 2019-12-09 DIAGNOSIS — Z51.12 ENCOUNTER FOR ANTINEOPLASTIC IMMUNOTHERAPY: ICD-10-CM

## 2019-12-09 DIAGNOSIS — D70.1 CHEMOTHERAPY-INDUCED NEUTROPENIA (HCC): Primary | ICD-10-CM

## 2019-12-09 DIAGNOSIS — R06.02 SOB (SHORTNESS OF BREATH): ICD-10-CM

## 2019-12-09 DIAGNOSIS — Z71.89 CARE PLAN DISCUSSED WITH PATIENT: ICD-10-CM

## 2019-12-09 DIAGNOSIS — T45.1X5A ADVERSE EFFECT OF CHEMOTHERAPY, INITIAL ENCOUNTER: ICD-10-CM

## 2019-12-09 DIAGNOSIS — G89.3 CANCER-RELATED PAIN: ICD-10-CM

## 2019-12-09 DIAGNOSIS — T45.1X5A CHEMOTHERAPY-INDUCED NEUTROPENIA (HCC): Primary | ICD-10-CM

## 2019-12-09 DIAGNOSIS — Z51.11 CHEMOTHERAPY MANAGEMENT, ENCOUNTER FOR: ICD-10-CM

## 2019-12-09 DIAGNOSIS — T50.905S TOXICITY, LATE EFFECT, DUE TO DRUG, MEDICINE, OR BIOLOGICAL SUBSTANCE: ICD-10-CM

## 2019-12-09 DIAGNOSIS — C85.90 NON-HODGKIN'S LYMPHOMA, UNSPECIFIED BODY REGION, UNSPECIFIED NON-HODGKIN LYMPHOMA TYPE (HCC): ICD-10-CM

## 2019-12-09 PROCEDURE — 96415 CHEMO IV INFUSION ADDL HR: CPT

## 2019-12-09 PROCEDURE — G8484 FLU IMMUNIZE NO ADMIN: HCPCS | Performed by: INTERNAL MEDICINE

## 2019-12-09 PROCEDURE — 6360000002 HC RX W HCPCS: Performed by: INTERNAL MEDICINE

## 2019-12-09 PROCEDURE — 6370000000 HC RX 637 (ALT 250 FOR IP): Performed by: INTERNAL MEDICINE

## 2019-12-09 PROCEDURE — 99214 OFFICE O/P EST MOD 30 MIN: CPT | Performed by: INTERNAL MEDICINE

## 2019-12-09 PROCEDURE — 1036F TOBACCO NON-USER: CPT | Performed by: INTERNAL MEDICINE

## 2019-12-09 PROCEDURE — 4040F PNEUMOC VAC/ADMIN/RCVD: CPT | Performed by: INTERNAL MEDICINE

## 2019-12-09 PROCEDURE — G8420 CALC BMI NORM PARAMETERS: HCPCS | Performed by: INTERNAL MEDICINE

## 2019-12-09 PROCEDURE — G8427 DOCREV CUR MEDS BY ELIG CLIN: HCPCS | Performed by: INTERNAL MEDICINE

## 2019-12-09 PROCEDURE — 36415 COLL VENOUS BLD VENIPUNCTURE: CPT

## 2019-12-09 PROCEDURE — 96417 CHEMO IV INFUS EACH ADDL SEQ: CPT

## 2019-12-09 PROCEDURE — 96375 TX/PRO/DX INJ NEW DRUG ADDON: CPT

## 2019-12-09 PROCEDURE — 96413 CHEMO IV INFUSION 1 HR: CPT

## 2019-12-09 PROCEDURE — 80053 COMPREHEN METABOLIC PANEL: CPT

## 2019-12-09 PROCEDURE — 2500000003 HC RX 250 WO HCPCS: Performed by: INTERNAL MEDICINE

## 2019-12-09 PROCEDURE — 85025 COMPLETE CBC W/AUTO DIFF WBC: CPT

## 2019-12-09 PROCEDURE — 1123F ACP DISCUSS/DSCN MKR DOCD: CPT | Performed by: INTERNAL MEDICINE

## 2019-12-09 PROCEDURE — 2580000003 HC RX 258: Performed by: INTERNAL MEDICINE

## 2019-12-09 RX ORDER — HEPARIN SODIUM (PORCINE) LOCK FLUSH IV SOLN 100 UNIT/ML 100 UNIT/ML
500 SOLUTION INTRAVENOUS PRN
Status: CANCELLED | OUTPATIENT
Start: 2019-12-09

## 2019-12-09 RX ORDER — PALONOSETRON 0.05 MG/ML
0.25 INJECTION, SOLUTION INTRAVENOUS ONCE
Status: CANCELLED | OUTPATIENT
Start: 2019-12-09

## 2019-12-09 RX ORDER — DIPHENHYDRAMINE HYDROCHLORIDE 50 MG/ML
50 INJECTION INTRAMUSCULAR; INTRAVENOUS PRN
Status: CANCELLED | OUTPATIENT
Start: 2019-12-10

## 2019-12-09 RX ORDER — DIPHENHYDRAMINE HYDROCHLORIDE 50 MG/ML
25 INJECTION INTRAMUSCULAR; INTRAVENOUS ONCE
Status: COMPLETED | OUTPATIENT
Start: 2019-12-09 | End: 2019-12-09

## 2019-12-09 RX ORDER — PALONOSETRON 0.05 MG/ML
0.25 INJECTION, SOLUTION INTRAVENOUS ONCE
Status: CANCELLED
Start: 2019-12-09

## 2019-12-09 RX ORDER — METHYLPREDNISOLONE SODIUM SUCCINATE 125 MG/2ML
125 INJECTION, POWDER, LYOPHILIZED, FOR SOLUTION INTRAMUSCULAR; INTRAVENOUS PRN
Status: CANCELLED | OUTPATIENT
Start: 2019-12-10

## 2019-12-09 RX ORDER — DIPHENHYDRAMINE HYDROCHLORIDE 50 MG/ML
25 INJECTION INTRAMUSCULAR; INTRAVENOUS ONCE
Status: CANCELLED | OUTPATIENT
Start: 2019-12-09

## 2019-12-09 RX ORDER — DIPHENHYDRAMINE HYDROCHLORIDE 50 MG/ML
50 INJECTION INTRAMUSCULAR; INTRAVENOUS PRN
Status: CANCELLED | OUTPATIENT
Start: 2019-12-09

## 2019-12-09 RX ORDER — SODIUM CHLORIDE 0.9 % (FLUSH) 0.9 %
10 SYRINGE (ML) INJECTION PRN
Status: DISCONTINUED | OUTPATIENT
Start: 2019-12-09 | End: 2019-12-10 | Stop reason: HOSPADM

## 2019-12-09 RX ORDER — SODIUM CHLORIDE 9 MG/ML
20 INJECTION, SOLUTION INTRAVENOUS ONCE
Status: CANCELLED | OUTPATIENT
Start: 2019-12-10

## 2019-12-09 RX ORDER — EPINEPHRINE 1 MG/ML
0.3 INJECTION, SOLUTION, CONCENTRATE INTRAVENOUS PRN
Status: CANCELLED | OUTPATIENT
Start: 2019-12-09

## 2019-12-09 RX ORDER — SODIUM CHLORIDE 0.9 % (FLUSH) 0.9 %
10 SYRINGE (ML) INJECTION PRN
Status: CANCELLED | OUTPATIENT
Start: 2019-12-09

## 2019-12-09 RX ORDER — EPINEPHRINE 1 MG/ML
0.3 INJECTION, SOLUTION, CONCENTRATE INTRAVENOUS PRN
Status: CANCELLED | OUTPATIENT
Start: 2019-12-10

## 2019-12-09 RX ORDER — SODIUM CHLORIDE 0.9 % (FLUSH) 0.9 %
5 SYRINGE (ML) INJECTION PRN
Status: CANCELLED | OUTPATIENT
Start: 2019-12-09

## 2019-12-09 RX ORDER — SODIUM CHLORIDE 0.9 % (FLUSH) 0.9 %
5 SYRINGE (ML) INJECTION PRN
Status: CANCELLED | OUTPATIENT
Start: 2019-12-10

## 2019-12-09 RX ORDER — SODIUM CHLORIDE 0.9 % (FLUSH) 0.9 %
10 SYRINGE (ML) INJECTION PRN
Status: CANCELLED | OUTPATIENT
Start: 2019-12-10

## 2019-12-09 RX ORDER — PALONOSETRON 0.05 MG/ML
0.25 INJECTION, SOLUTION INTRAVENOUS ONCE
Status: DISCONTINUED | OUTPATIENT
Start: 2019-12-09 | End: 2019-12-09 | Stop reason: SDUPTHER

## 2019-12-09 RX ORDER — PALONOSETRON 0.05 MG/ML
0.25 INJECTION, SOLUTION INTRAVENOUS ONCE
Status: COMPLETED | OUTPATIENT
Start: 2019-12-09 | End: 2019-12-09

## 2019-12-09 RX ORDER — DEXAMETHASONE SODIUM PHOSPHATE 10 MG/ML
10 INJECTION, SOLUTION INTRAMUSCULAR; INTRAVENOUS ONCE
Status: COMPLETED | OUTPATIENT
Start: 2019-12-09 | End: 2019-12-09

## 2019-12-09 RX ORDER — SODIUM CHLORIDE 9 MG/ML
20 INJECTION, SOLUTION INTRAVENOUS ONCE
Status: CANCELLED | OUTPATIENT
Start: 2019-12-09

## 2019-12-09 RX ORDER — ACETAMINOPHEN 325 MG/1
1000 TABLET ORAL ONCE
Status: CANCELLED | OUTPATIENT
Start: 2019-12-09

## 2019-12-09 RX ORDER — METHYLPREDNISOLONE SODIUM SUCCINATE 125 MG/2ML
125 INJECTION, POWDER, LYOPHILIZED, FOR SOLUTION INTRAMUSCULAR; INTRAVENOUS PRN
Status: CANCELLED | OUTPATIENT
Start: 2019-12-09

## 2019-12-09 RX ORDER — ACETAMINOPHEN 500 MG
1000 TABLET ORAL ONCE
Status: COMPLETED | OUTPATIENT
Start: 2019-12-09 | End: 2019-12-09

## 2019-12-09 RX ORDER — HEPARIN SODIUM (PORCINE) LOCK FLUSH IV SOLN 100 UNIT/ML 100 UNIT/ML
500 SOLUTION INTRAVENOUS PRN
Status: DISCONTINUED | OUTPATIENT
Start: 2019-12-09 | End: 2019-12-10 | Stop reason: HOSPADM

## 2019-12-09 RX ORDER — SODIUM CHLORIDE 0.9 % (FLUSH) 0.9 %
5 SYRINGE (ML) INJECTION PRN
Status: DISCONTINUED | OUTPATIENT
Start: 2019-12-09 | End: 2019-12-10 | Stop reason: HOSPADM

## 2019-12-09 RX ORDER — HEPARIN SODIUM (PORCINE) LOCK FLUSH IV SOLN 100 UNIT/ML 100 UNIT/ML
500 SOLUTION INTRAVENOUS PRN
Status: CANCELLED | OUTPATIENT
Start: 2019-12-10

## 2019-12-09 RX ADMIN — HEPARIN 500 UNITS: 100 SYRINGE at 15:58

## 2019-12-09 RX ADMIN — Medication 10 ML: at 15:58

## 2019-12-09 RX ADMIN — ACETAMINOPHEN 1000 MG: 500 TABLET ORAL at 12:43

## 2019-12-09 RX ADMIN — PALONOSETRON 0.25 MG: 0.05 INJECTION, SOLUTION INTRAVENOUS at 12:43

## 2019-12-09 RX ADMIN — BENDAMUSTINE HYDROCHLORIDE 118.2 MG: 25 INJECTION, SOLUTION INTRAVENOUS at 15:04

## 2019-12-09 RX ADMIN — DIPHENHYDRAMINE HYDROCHLORIDE 25 MG: 50 INJECTION, SOLUTION INTRAMUSCULAR; INTRAVENOUS at 12:46

## 2019-12-09 RX ADMIN — POLATUZUMAB VEDOTIN 129 MG: 140 INJECTION, POWDER, LYOPHILIZED, FOR SOLUTION INTRAVENOUS at 15:27

## 2019-12-09 RX ADMIN — RITUXIMAB 740 MG: 10 INJECTION, SOLUTION INTRAVENOUS at 13:22

## 2019-12-09 RX ADMIN — DEXAMETHASONE SODIUM PHOSPHATE 10 MG: 10 INJECTION, SOLUTION INTRAMUSCULAR; INTRAVENOUS at 12:45

## 2019-12-09 ASSESSMENT — PAIN SCALES - GENERAL: PAINLEVEL_OUTOF10: 0

## 2019-12-10 ENCOUNTER — HOSPITAL ENCOUNTER (OUTPATIENT)
Dept: INFUSION THERAPY | Age: 79
Discharge: HOME | End: 2019-12-10
Payer: MEDICARE

## 2019-12-10 VITALS — DIASTOLIC BLOOD PRESSURE: 68 MMHG | SYSTOLIC BLOOD PRESSURE: 120 MMHG | TEMPERATURE: 96.9 F

## 2019-12-10 DIAGNOSIS — C83.39 DIFFUSE LARGE B-CELL LYMPHOMA, EXTRANODAL AND SOLID ORGAN SITES (HCC): ICD-10-CM

## 2019-12-10 DIAGNOSIS — D70.1 CHEMOTHERAPY-INDUCED NEUTROPENIA (HCC): Primary | ICD-10-CM

## 2019-12-10 DIAGNOSIS — T45.1X5A CHEMOTHERAPY-INDUCED NEUTROPENIA (HCC): Primary | ICD-10-CM

## 2019-12-10 DIAGNOSIS — C85.90 NON-HODGKIN'S LYMPHOMA, UNSPECIFIED BODY REGION, UNSPECIFIED NON-HODGKIN LYMPHOMA TYPE (HCC): ICD-10-CM

## 2019-12-10 PROCEDURE — 96377 APPLICATON ON-BODY INJECTOR: CPT

## 2019-12-10 PROCEDURE — 6360000002 HC RX W HCPCS: Performed by: NURSE PRACTITIONER

## 2019-12-10 PROCEDURE — 96413 CHEMO IV INFUSION 1 HR: CPT

## 2019-12-10 PROCEDURE — 2580000003 HC RX 258: Performed by: INTERNAL MEDICINE

## 2019-12-10 PROCEDURE — 96401 CHEMO ANTI-NEOPL SQ/IM: CPT

## 2019-12-10 PROCEDURE — 6360000002 HC RX W HCPCS: Performed by: INTERNAL MEDICINE

## 2019-12-10 PROCEDURE — 96367 TX/PROPH/DG ADDL SEQ IV INF: CPT

## 2019-12-10 PROCEDURE — 2580000003 HC RX 258: Performed by: NURSE PRACTITIONER

## 2019-12-10 PROCEDURE — 96375 TX/PRO/DX INJ NEW DRUG ADDON: CPT

## 2019-12-10 RX ORDER — HEPARIN SODIUM (PORCINE) LOCK FLUSH IV SOLN 100 UNIT/ML 100 UNIT/ML
300 SOLUTION INTRAVENOUS PRN
Status: DISCONTINUED | OUTPATIENT
Start: 2019-12-10 | End: 2019-12-11 | Stop reason: HOSPADM

## 2019-12-10 RX ORDER — SODIUM CHLORIDE 0.9 % (FLUSH) 0.9 %
10 SYRINGE (ML) INJECTION PRN
Status: DISCONTINUED | OUTPATIENT
Start: 2019-12-10 | End: 2019-12-11 | Stop reason: HOSPADM

## 2019-12-10 RX ADMIN — PEGFILGRASTIM 6 MG: KIT SUBCUTANEOUS at 12:14

## 2019-12-10 RX ADMIN — ONDANSETRON: 2 INJECTION INTRAMUSCULAR; INTRAVENOUS at 10:50

## 2019-12-10 RX ADMIN — HEPARIN 300 UNITS: 100 SYRINGE at 12:23

## 2019-12-10 RX ADMIN — BENDAMUSTINE HYDROCHLORIDE 118.2 MG: 25 INJECTION, SOLUTION INTRAVENOUS at 11:54

## 2019-12-10 RX ADMIN — SODIUM CHLORIDE, PRESERVATIVE FREE 10 ML: 5 INJECTION INTRAVENOUS at 12:23

## 2019-12-12 ENCOUNTER — READMISSION MANAGEMENT (OUTPATIENT)
Dept: CALL CENTER | Facility: HOSPITAL | Age: 79
End: 2019-12-12

## 2019-12-12 NOTE — OUTREACH NOTE
Medical Week 5 Survey      Responses   Facility patient discharged from?  [facility]   Does the patient have one of the following disease processes/diagnoses(primary or secondary)?  Other   Week 5 attempt successful?  Yes   Call start time  1552   Call end time  1601   Discharge diagnosis  aphasia,  HTN,  DM,  disease of thyroid gland,  hyperlipidemia,  diffuse large B cell lymphoma,  elevated troponin,  persistent atrial fibrillation   Is patient permission given to speak with other caregiver?  Yes   Person spoke with today (if not patient) and relationship  Gina-spouse   Meds reviewed with patient/caregiver?  Yes   Is the patient having any side effects they believe may be caused by any medication additions or changes?  No   Is the patient taking all medications as directed (includes completed medication regime)?  Yes   Has the patient kept scheduled appointments due by today?  Yes   Is the patient still receiving Home Health Services?  Yes   Psychosocial issues?  No   What is the patient's perception of their health status since discharge?  Improving   Is the patient/caregiver able to teach back signs and symptoms related to disease process for when to call PCP?  Yes   Is the patient/caregiver able to teach back signs and symptoms related to disease process for when to call 911?  Yes   Is the patient/caregiver able to teach back the hierarchy of who to call/visit for symptoms/problems? PCP, Specialist, Home health nurse, Urgent Care, ED, 911  Yes   Additional teach back comments  Chemo is wearing him down she says.  They are concerned about doing cataract surgery.    Graduated  Yes   Did the patient feel the follow up calls were helpful during their recovery period?  Yes   Was the number of calls appropriate?  Yes          Katia Ortega RN

## 2019-12-20 ENCOUNTER — TELEPHONE (OUTPATIENT)
Dept: INFUSION THERAPY | Age: 79
End: 2019-12-20

## 2019-12-26 RX ORDER — MEGESTROL ACETATE 40 MG/ML
SUSPENSION ORAL
Qty: 240 ML | Refills: 1 | Status: SHIPPED | OUTPATIENT
Start: 2019-12-26

## 2019-12-27 ENCOUNTER — TRANSCRIBE ORDERS (OUTPATIENT)
Dept: ADMINISTRATIVE | Facility: HOSPITAL | Age: 79
End: 2019-12-27

## 2019-12-27 DIAGNOSIS — R07.89 PAIN, CHEST WALL: ICD-10-CM

## 2019-12-27 DIAGNOSIS — C85.90 NON-HODGKIN'S LYMPHOMA, UNSPECIFIED BODY REGION, UNSPECIFIED NON-HODGKIN LYMPHOMA TYPE (HCC): ICD-10-CM

## 2019-12-27 DIAGNOSIS — C85.90 NON-HODGKIN'S LYMPHOMA, UNSPECIFIED BODY REGION, UNSPECIFIED NON-HODGKIN LYMPHOMA TYPE (HCC): Primary | ICD-10-CM

## 2019-12-27 DIAGNOSIS — R07.89 CHEST WALL PAIN: Primary | ICD-10-CM

## 2019-12-30 ENCOUNTER — TELEPHONE (OUTPATIENT)
Dept: INFUSION THERAPY | Age: 79
End: 2019-12-30

## 2020-01-02 ENCOUNTER — HOSPITAL ENCOUNTER (OUTPATIENT)
Dept: CT IMAGING | Facility: HOSPITAL | Age: 80
End: 2020-01-02

## 2020-01-02 ENCOUNTER — HOSPITAL ENCOUNTER (OUTPATIENT)
Dept: INFUSION THERAPY | Age: 80
End: 2020-01-02

## 2020-01-03 ENCOUNTER — HOSPITAL ENCOUNTER (OUTPATIENT)
Dept: INFUSION THERAPY | Age: 80
End: 2020-01-03

## 2020-01-03 RX ORDER — GABAPENTIN 100 MG/1
100 CAPSULE ORAL
Qty: 90 CAPSULE | Refills: 3 | Status: SHIPPED | OUTPATIENT
Start: 2020-01-03 | End: 2020-02-02

## 2020-01-09 ENCOUNTER — HOSPITAL ENCOUNTER (OUTPATIENT)
Dept: INFUSION THERAPY | Age: 80
End: 2020-01-09

## 2020-01-10 RX ORDER — HYDROCODONE BITARTRATE AND ACETAMINOPHEN 7.5; 325 MG/1; MG/1
1 TABLET ORAL EVERY 4 HOURS PRN
COMMUNITY
End: 2020-01-10 | Stop reason: SDUPTHER

## 2020-01-10 RX ORDER — HYDROCODONE BITARTRATE AND ACETAMINOPHEN 7.5; 325 MG/1; MG/1
1 TABLET ORAL EVERY 4 HOURS PRN
Qty: 120 TABLET | Refills: 0 | Status: SHIPPED | OUTPATIENT
Start: 2020-01-10 | End: 2020-02-10 | Stop reason: SDUPTHER

## 2020-01-15 NOTE — PROGRESS NOTES
Kathleen Almaraz   1940 1/16/2020     Chief Complaint   Patient presents with    Lymphoma     Diffuse large b-cell lymphoma, extranodal and solid organ sites [facility])        Interval history/history of present illness:  Diagnosis   Non-Hodgkin lymphoma, DLBC   Stage IVB   ABC phenotype   FISH rearrangement negative for BCL2, BCL6 and c-Myc   Bone marrow positive for clonal B-cell gene rearrangement (minimal bone marrow involvement by lymphoma)   IPS score 4   Nrxgbkaamzeyfs-wsqgtcejtgctr-vdbiqxa? ? Lymphoma relapse(chest wall recurrence), July 2019  Treatment summary  01/18/2019-4/15/2019-R-CHOP chemotherapy   Revlimid 15 mg by mouth daily, days 1-10 starting with cycle #3 through cycle#5   Completion of RT right chest wall recurrence-August 2019  10/7/2019-Polatuzumab, Bendamustine and Rituximab. 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 He received frontline chemotherapy with R-CHOP with Revlimid completed in April 2019. Unfortunately, he had disease recurrence documented in July 2019 the left chest wall and received curative intention RT completed in August 2019. More recently scans in September 2019 showed recurrent disease with intra-abdominal intrathoracic recurrence. He was recommended second line palliative treatment with rituximab, bendamustine and polatuzumab. He received cycle 4 chemotherapy about a month ago. The patient presents today with shingles. He is on acyclovir. Hematology history and daughter have both daughters currently undergoing at [de-identified]  Mr Kathleen Almaraz was seen in initial oncology consultation on 1/11/2018 referred for a diagnosis of non-Hodgkin lymphoma.  He initially presented on 12/24/2018 to the ER department at [facility].  12/24/2018-CT chest showed at [facility] a left upper lobe with a pleural based 7.6 x 4.1 x 9.4 cm mass which may infiltrate the chest wall. Enlarged lymph node at the left lateral aspect of the pulmonary artery measures 2.5 cm.   12/26/[id number] biopsy compatible with large B cell malignant lymphoma, favor high grade. Flow cytometry identifies a population of monoclonal B lymphoid cells restricted to kappa light chain expression. These cells are CD5 and CD10 negative. CD20 is strongly and diffusely positive within the cells supporting a diagnosis of a B cell malignant lymphoma. Ki-67 marks approximate 50% of the cells which supports a diagnosis of a high-grade lymphoma. 1/11/2019-she was first seen by me. Recommended PET scan, bone marrow biopsy   1/14/2018-PET scan showed abnormal hypermetabolic uptake in the left upper lobe, left AP window mediastinal adenopathy measuring 4.3 x 2.9 cm with SUV 6. Mid abdomen mesenteric mass measuring 3 cm and SUV 5.2.   1/16/2019- Bone marrow biopsy documented overall normal cellular for age (~15 -20% ) with trilineage hematopoiesis and no increased blasts. Immunohistochemical stain with CD20 and CD79a show minimally increased small B-cells with an atypical interstitial pattern of distribution and focal clustering, <5% of total bone marrow cellularity. Flow cytometry revealed monoclonal B-cell population (~6% of total cellular events and ~36% of total lymphocytes), consistent with a B-cell lymphoproliferative disorder. Molecular report was positive for clonal B-cell gene rearrangement. 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 1/16/2019- 2-D echocardiogram documented an estimated ejection fraction of 66-70%   1/18/2019- initiation of systemic chemotherapy with R CHOP.   3/4/2019 - initiated Revlimid 15 mg daily, days 1 through 10 on a 21 day cycle , with cycle #3 of R CHOP.   3/11/2019-CT chest, abdomen, pelvis showed almost complete resolution of previous sites of lymphoma. 5/14/2019-2-D echo showed EF 41-45%. Mitral valve regurgitation.  He was hospitalized at [facility].  2019-I discussed the findings of the CAT scans the patient. I recommend to continue treatment. ECO  Treatment related toxicity- fatigue.     Past medical history:  Past Medical History:   Diagnosis Date    Cancer [facility])     large b cell lymphoma    Chest pain, unspecified     Diabetes mellitus ([facility]     Diffuse large b-cell lymphoma, extranodal and solid organ sites [facility])     Encounter for other preprocedural examination     Hyperlipidemia     Hypertension     IBS (irritable bowel syndrome)     Neuralgia and neuritis     Non-Hodgkin lymphoma ([facility]     Osteoarthritis     Peripheral vascular disease (HCC)     Thyroid disease     had parathyroid gland removed        Past surgical history:  Past Surgical History:   Procedure Laterality Date    CARDIAC SURGERY      heart cath 2    COLONOSCOPY      COLONOSCOPY      INSERTION / REMOVAL / REPLACEMENT VENOUS ACCESS CATHETER N/A 2019    SINGLE LUMEN PORT PLACEMENT performed by Marilynne Gilford, MD at [place] [address]      PARATHYROIDECTOMY      PROSTATECTOMY          Social history:  Social History     Socioeconomic History    Marital status:      Spouse name: Not on file    Number of children: Not on file    Years of education: Not on file    Highest education level: Not on file   Occupational History    Not on file   Social Needs    Financial resource strain: Not on file    Food insecurity:     Worry: Not on file     Inability: Not on file    Transportation needs:     Medical: Not on file     Non-medical: Not on file   Tobacco Use    Smoking status: Never Smoker    Smokeless tobacco: Never Used   Substance and Sexual Activity    Alcohol use: No    Drug use: Not on file    Sexual activity: Not on file   Lifestyle    Physical activity:     Days per week: Not on file     Minutes per session: Not on file    Stress: Not on file   Relationships    Social connections:     Talks on phone: Not on file     Gets together: Not on file     Attends Mosque service: Not on file     Active member of club or organization: Not on file     Attends meetings of clubs or organizations: Not on file     Relationship status: Not on file    Intimate partner violence:     Fear of current or ex partner: Not on file     Emotionally abused: Not on file     Physically abused: Not on file     Forced sexual activity: Not on file   Other Topics Concern    Not on file   Social History Narrative    Not on file        Family history:   Family History   Problem Relation Age of Onset    High Blood Pressure Mother     Heart Disease Mother     Other Father     Cancer Father         Current Outpatient Medications   Medication Sig Dispense Refill    empagliflozin (JARDIANCE) 10 MG tablet Take 10 mg by mouth daily      Probiotic Product (PROBIOTIC DAILY PO) Take by mouth      pantoprazole (PROTONIX) 40 MG tablet TAKE 1 TABLET TWICE DAILY30 MINUTES BEFORE BREAKFAST AND SUPPER 60 tablet 2    ELIQUIS 5 MG TABS tablet   11    digoxin (LANOXIN) 125 MCG tablet   11    finasteride (PROSCAR) 5 MG tablet       loratadine (CLARITIN) 10 MG tablet Take 10 mg by mouth      metoprolol tartrate (LOPRESSOR) 25 MG tablet       B Complex CAPS Take 1 capsule by mouth 2 times daily      metFORMIN (GLUCOPHAGE) 500 MG tablet Take 500 mg by mouth 2 times daily (with meals)      HYDROcodone-acetaminophen (NORCO) 7.5-325 MG per tablet Take 1 tablet by mouth every 4 hours as needed for Pain for up to 30 days. 120 tablet 0    gabapentin (NEURONTIN) 100 MG capsule Take 1 capsule by mouth 3 times daily (with meals) for 30 days.  90 capsule 3    megestrol (MEGACE) 40 MG/ML suspension TAKE 10 MLS BY MOUTH DAILY 240 mL 1    allopurinol (ZYLOPRIM) 300 MG tablet Take 1 tablet by mouth daily 30 tablet 0    Lidocaine-Prilocaine, Bulk, 2.5-2.5 % CREA Apply topically      melatonin 5 MG TABS tablet Take 5 mg by mouth      traZODone (DESYREL) 50 MG tablet Take 50 mg by mouth      furosemide (LASIX) 20 MG tablet Take 20 mg by mouth every morning      potassium chloride (KLOR-CON M) 20 MEQ extended release tablet Take 1 tablet by mouth daily  11    polyethylene glycol (GLYCOLAX) powder Take 17 g by mouth daily      UNABLE TO FIND        No current facility-administered medications for this visit. REVIEW OF SYSTEMS:    Constitutional: no fever, no night sweats, fatigue; generalized weakness  HEENT: no blurring of vision, no double vision, no hearing difficulty, no tinnitus,no ulceration, no dental caries, no dysphagia  Lungs: no cough, shortness of breath, no wheeze  CVS: no palpitation, no chest pain, no shortness of breath;  GI: no abdominal pain, no nausea , no vomiting, no constipation;   JESSICA: no dysuria, frequency and urgency, no hematuria, no kidney stones;   Musculoskeletal: no joint pain, swelling , stiffness;  Endocrine: no polyuria, polydypsia, no cold or heat intolerence; Hematology/lymphatic: no easy brusing or bleeding, no hx of clotting disorder; no peripheral adenopathy. Dermatology: no eczema, no pruritis; skin lesions consistent with shingles. Psychiatry: no depression, no anxiety,no panic attacks, no suicide ideation; Neurology: no syncope, no seizures, no numbness or tingling of hands, no numbness or tingling of feet, no paresis;     PHYSICAL EXAM:    Vitals signs:  /80   Ht 6' 2\" (1.88 m)   Wt 141 lb 9.6 oz (64.2 kg)   BMI 18.18 kg/m²    Pain scale:  Pain Score:   7     CONSTITUTIONAL: Alert, appropriate, no acute distress, cachectic, chronically ill-appearing  EYES: Non icteric, EOM intact, pupils equal round and reactive to light and accommodation. ENT: Oral mucus membranes moist, no oral pharyngeal lesions. External inspection of ears and nose are normal.   NECK: Supple, no masses. No palpable thyroid mass    CHEST/LUNGS: CTA bilaterally, normal respiratory effort   CARDIOVASCULAR: RRR, no murmurs.  No lower extremity edema   ABDOMEN: soft non-tender, active bowel sounds, no hepatosplenomegaly. No palpable masses. EXTREMITIES: warm, Full ROM of all fours extremities. No focal weakness. SKIN: warm, dry with no rashes or lesions  LYMPH: No cervical, clavicular, axillary, or inguinal lymphadenopathy  NEUROLOGIC: follows commands, non focal.   PSYCH: mood and affect appropriate. Alert and oriented to time and place and person. Relevant Lab findings:  CBC:01/16/2020  WBC-10.22  HGB-12.0  PLT-146,000  Neut-5.24    Relevant Imaging studies   No results found. ASSESSMENT:    No orders of the defined types were placed in this encounter. Joaquim Eisenmenger was seen today for lymphoma. Diagnoses and all orders for this visit:    Diffuse large b-cell lymphoma, extranodal and solid organ sites [facility])    Chemotherapy management, encounter for    Care plan discussed with patient    Adverse effect of chemotherapy, subsequent encounter       ABC phenotype Diffuse large B-cell lymphoma, stage IVb relapse (left chest wall) July 2019. Recommended:  Polatuzumab vedotin-piiq, 1.8 mg/kg by intravenous infusion, given on day 2 of cycle 1 and on day 1 of subsequent cycles. Bendamustine (70 mg/m2 intravenously) was administered on days 2 and 3 of cycle 1 and on days 1 and 2 of subsequent cycles. A rituximab (375 mg/m2 intravenously) was administered on day 1 of each cycle. Hold cycle #5 today. Rescheduled treatment in about continue with 2 weeks. Continue with reduced dose of bendamustine to 60 mg/m² today. Continue polatuzumab and rituximab. Repeat CT chest abdomen pelvis before after 6 cycles. Shingles RONALD extremity- s/p acyclovir. Chemotherapy toxicity-LVEF 60%, fatigue, anemia. Normocytic Anemia- secondary to chemotherapy. No need for intervention. Hemoglobin 12    Deconditioning- encouraged increase physical activity. Systolic heart failure- stable.   Last [facility]D [facility] 11/10/2019 EF 60% with left diastolic dysfunction. Kendra Xie He is followed by the [facility] cardiology group. Memory loss- mild. This could be related to chemo brain. No intervention. Weight loss-improving. The patient has resumed Megace. Continue Megace  ? Summary of Plan:   1. Hold treatement chemotherapy x 2 weeks. 2. RTC with nurses for chemotherapy regimen. 3. RTC MD cycle #6  4. CMP today  5. Repeat CT chest abdomen pelvis after completion of 6 cycles of treatment. I have seen, examined and reviewed this patient medication list, appropriate labs and imaging studies. I reviewed relevant medical records and others physicians notes. I discussed the plans of care with the patient. I answered all the questions to the patients satisfaction. Follow Up:     No follow-ups on file. Data Sandra Ramos am scribing for Charles Lima MD. Electronically signed by Gilson Ramos on 1/16/2020 at 5:58 PM.       I, Dr Fouzia Burdick, personally performed the services described in this documentation as scribed by Gilson Ramos MA in my presence and is both accurate and complete. Over 50% of the total visit time of 25 minutes in face to face encounter with the patient, out of which more than 50% of the time was spent in counseling patient or family and coordination of care. Counseling included but was not limited to time spent reviewing labs, imaging studies/ treatment plan and answering questions.

## 2020-01-16 ENCOUNTER — OFFICE VISIT (OUTPATIENT)
Dept: HEMATOLOGY | Age: 80
End: 2020-01-16
Payer: MEDICARE

## 2020-01-16 ENCOUNTER — HOSPITAL ENCOUNTER (OUTPATIENT)
Dept: INFUSION THERAPY | Age: 80
Discharge: HOME | End: 2020-01-16
Payer: MEDICARE

## 2020-01-16 VITALS
WEIGHT: 141.6 LBS | SYSTOLIC BLOOD PRESSURE: 120 MMHG | DIASTOLIC BLOOD PRESSURE: 80 MMHG | HEIGHT: 74 IN | BODY MASS INDEX: 18.17 KG/M2

## 2020-01-16 DIAGNOSIS — C83.39 DIFFUSE LARGE B-CELL LYMPHOMA, EXTRANODAL AND SOLID ORGAN SITES (HCC): ICD-10-CM

## 2020-01-16 PROCEDURE — 85025 COMPLETE CBC W/AUTO DIFF WBC: CPT

## 2020-01-16 PROCEDURE — G8427 DOCREV CUR MEDS BY ELIG CLIN: HCPCS | Performed by: INTERNAL MEDICINE

## 2020-01-16 PROCEDURE — 1123F ACP DISCUSS/DSCN MKR DOCD: CPT | Performed by: INTERNAL MEDICINE

## 2020-01-16 PROCEDURE — 99211 OFF/OP EST MAY X REQ PHY/QHP: CPT

## 2020-01-16 PROCEDURE — G8484 FLU IMMUNIZE NO ADMIN: HCPCS | Performed by: INTERNAL MEDICINE

## 2020-01-16 PROCEDURE — 1036F TOBACCO NON-USER: CPT | Performed by: INTERNAL MEDICINE

## 2020-01-16 PROCEDURE — 99214 OFFICE O/P EST MOD 30 MIN: CPT | Performed by: INTERNAL MEDICINE

## 2020-01-16 PROCEDURE — G8419 CALC BMI OUT NRM PARAM NOF/U: HCPCS | Performed by: INTERNAL MEDICINE

## 2020-01-16 PROCEDURE — 4040F PNEUMOC VAC/ADMIN/RCVD: CPT | Performed by: INTERNAL MEDICINE

## 2020-01-16 RX ORDER — FENTANYL 12 UG/H
1 PATCH TRANSDERMAL
Qty: 10 PATCH | Refills: 0 | Status: SHIPPED | OUTPATIENT
Start: 2020-01-16 | End: 2020-01-27 | Stop reason: DRUGHIGH

## 2020-01-26 RX ORDER — SODIUM CHLORIDE 0.9 % (FLUSH) 0.9 %
5 SYRINGE (ML) INJECTION PRN
Status: CANCELLED | OUTPATIENT
Start: 2020-02-04

## 2020-01-26 RX ORDER — PALONOSETRON 0.05 MG/ML
0.25 INJECTION, SOLUTION INTRAVENOUS ONCE
Status: CANCELLED | OUTPATIENT
Start: 2020-02-03

## 2020-01-26 RX ORDER — HEPARIN SODIUM (PORCINE) LOCK FLUSH IV SOLN 100 UNIT/ML 100 UNIT/ML
300 SOLUTION INTRAVENOUS PRN
Status: CANCELLED | OUTPATIENT
Start: 2020-02-04

## 2020-01-26 RX ORDER — DIPHENHYDRAMINE HYDROCHLORIDE 50 MG/ML
50 INJECTION INTRAMUSCULAR; INTRAVENOUS PRN
Status: CANCELLED | OUTPATIENT
Start: 2020-02-04

## 2020-01-26 RX ORDER — SODIUM CHLORIDE 0.9 % (FLUSH) 0.9 %
10 SYRINGE (ML) INJECTION PRN
Status: CANCELLED | OUTPATIENT
Start: 2020-02-04

## 2020-01-26 RX ORDER — SODIUM CHLORIDE 9 MG/ML
20 INJECTION, SOLUTION INTRAVENOUS ONCE
Status: CANCELLED | OUTPATIENT
Start: 2020-02-03

## 2020-01-26 RX ORDER — SODIUM CHLORIDE 0.9 % (FLUSH) 0.9 %
5 SYRINGE (ML) INJECTION PRN
Status: CANCELLED | OUTPATIENT
Start: 2020-02-03

## 2020-01-26 RX ORDER — SODIUM CHLORIDE 0.9 % (FLUSH) 0.9 %
10 SYRINGE (ML) INJECTION PRN
Status: CANCELLED | OUTPATIENT
Start: 2020-02-03

## 2020-01-26 RX ORDER — DIPHENHYDRAMINE HYDROCHLORIDE 50 MG/ML
50 INJECTION INTRAMUSCULAR; INTRAVENOUS PRN
Status: CANCELLED | OUTPATIENT
Start: 2020-02-03

## 2020-01-26 RX ORDER — EPINEPHRINE 1 MG/ML
0.3 INJECTION, SOLUTION, CONCENTRATE INTRAVENOUS PRN
Status: CANCELLED | OUTPATIENT
Start: 2020-02-04

## 2020-01-26 RX ORDER — SODIUM CHLORIDE 9 MG/ML
20 INJECTION, SOLUTION INTRAVENOUS ONCE
Status: CANCELLED | OUTPATIENT
Start: 2020-02-04

## 2020-01-26 RX ORDER — ACETAMINOPHEN 325 MG/1
1000 TABLET ORAL ONCE
Status: CANCELLED | OUTPATIENT
Start: 2020-02-03

## 2020-01-26 RX ORDER — DIPHENHYDRAMINE HYDROCHLORIDE 50 MG/ML
25 INJECTION INTRAMUSCULAR; INTRAVENOUS ONCE
Status: CANCELLED | OUTPATIENT
Start: 2020-02-03

## 2020-01-26 RX ORDER — METHYLPREDNISOLONE SODIUM SUCCINATE 125 MG/2ML
125 INJECTION, POWDER, LYOPHILIZED, FOR SOLUTION INTRAMUSCULAR; INTRAVENOUS PRN
Status: CANCELLED | OUTPATIENT
Start: 2020-02-03

## 2020-01-26 RX ORDER — HEPARIN SODIUM (PORCINE) LOCK FLUSH IV SOLN 100 UNIT/ML 100 UNIT/ML
300 SOLUTION INTRAVENOUS PRN
Status: CANCELLED | OUTPATIENT
Start: 2020-02-03

## 2020-01-26 RX ORDER — EPINEPHRINE 1 MG/ML
0.3 INJECTION, SOLUTION, CONCENTRATE INTRAVENOUS PRN
Status: CANCELLED | OUTPATIENT
Start: 2020-02-03

## 2020-01-26 RX ORDER — PALONOSETRON 0.05 MG/ML
0.25 INJECTION, SOLUTION INTRAVENOUS ONCE
Status: CANCELLED
Start: 2020-02-03

## 2020-01-26 RX ORDER — METHYLPREDNISOLONE SODIUM SUCCINATE 125 MG/2ML
125 INJECTION, POWDER, LYOPHILIZED, FOR SOLUTION INTRAMUSCULAR; INTRAVENOUS PRN
Status: CANCELLED | OUTPATIENT
Start: 2020-02-04

## 2020-01-27 RX ORDER — DRONABINOL 2.5 MG/1
2.5 CAPSULE ORAL DAILY
Qty: 60 CAPSULE | Refills: 0 | Status: SHIPPED | OUTPATIENT
Start: 2020-01-27 | End: 2020-02-26

## 2020-01-27 RX ORDER — DRONABINOL 2.5 MG/1
2.5 CAPSULE ORAL
COMMUNITY
End: 2020-01-27 | Stop reason: SDUPTHER

## 2020-01-27 RX ORDER — FENTANYL 25 UG/H
1 PATCH TRANSDERMAL
COMMUNITY

## 2020-01-27 RX ORDER — FENTANYL 25 UG/H
1 PATCH TRANSDERMAL
Qty: 10 PATCH | Refills: 0 | Status: SHIPPED | OUTPATIENT
Start: 2020-01-27 | End: 2020-02-26

## 2020-01-27 RX ORDER — FENTANYL 25 UG/H
1 PATCH TRANSDERMAL
COMMUNITY
End: 2020-01-27 | Stop reason: SDUPTHER

## 2020-01-27 RX ORDER — DRONABINOL 2.5 MG/1
2.5 CAPSULE ORAL DAILY
COMMUNITY

## 2020-01-30 NOTE — PROGRESS NOTES
Raymond Ahuja   1940  2/3/2020     Chief Complaint   Patient presents with    Other     Lymphoma        Interval history/history of present illness:  Diagnosis   Non-Hodgkin lymphoma, DLBC   Stage IVB   ABC phenotype   FISH rearrangement negative for BCL2, BCL6 and c-Myc   Bone marrow positive for clonal B-cell gene rearrangement (minimal bone marrow involvement by lymphoma)   IPS score 4   Qsgtjdohelmshj-zkkiyfcpxhpep-igftsud? ? Lymphoma relapse(chest wall recurrence), July 2019  Treatment summary  01/18/2019-4/15/2019-R-CHOP chemotherapy   Revlimid 15 mg by mouth daily, days 1-10 starting with cycle #3 through cycle#5   Completion of RT right chest wall recurrence-August 2019  10/7/2019-Polatuzumab, Bendamustine and Rituximab. 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 He received frontline chemotherapy with R-CHOP with Revlimid completed in April 2019. Unfortunately, he had disease recurrence documented in July 2019 the left chest wall and received curative intention RT completed in August 2019. More recently scans in September 2019 showed recurrent disease with intra-abdominal intrathoracic recurrence. He was recommended second line palliative treatment with rituximab, bendamustine and polatuzumab. He received a total of 4 cycles of chemotherapy/immunotherapy. Treatment has been delayed due to concurrent shingles infection. His shingles has improved. He still feels quite weak overall. He has persistent fatigue. He also complains of postherpetic neuralgia. He has been on fentanyl and Percocet as needed. Family and patient reports occasional confusion and bad dreams. His memory has not been so good as well. Hematology history  Mr Raymond Ahuja was seen in initial oncology consultation on 1/11/2018 referred for a diagnosis of non-Hodgkin lymphoma.  He initially presented on Physical activity:     Days per week: Not on file     Minutes per session: Not on file    Stress: Not on file   Relationships    Social connections:     Talks on phone: Not on file     Gets together: Not on file     Attends Yazidism service: Not on file     Active member of club or organization: Not on file     Attends meetings of clubs or organizations: Not on file     Relationship status: Not on file    Intimate partner violence:     Fear of current or ex partner: Not on file     Emotionally abused: Not on file     Physically abused: Not on file     Forced sexual activity: Not on file   Other Topics Concern    Not on file   Social History Narrative    Not on file        Family history:   Family History   Problem Relation Age of Onset    High Blood Pressure Mother     Heart Disease Mother     Other Father     Cancer Father         Current Outpatient Medications   Medication Sig Dispense Refill    pantoprazole (PROTONIX) 40 MG tablet TAKE 1 TABLET TWICE DAILY30 MINUTES BEFORE BREAKFAST AND SUPPER 60 tablet 2    dronabinol (MARINOL) 2.5 MG capsule Take 2.5 mg by mouth daily.  fentaNYL (DURAGESIC) 25 MCG/HR Place 1 patch onto the skin every 72 hours.  dronabinol (MARINOL) 2.5 MG capsule Take 1 capsule by mouth daily for 30 days. Take 1 tablet q HS x 3 days then increase to 2 tablets Qhs 60 capsule 0    fentaNYL (DURAGESIC) 25 MCG/HR Place 1 patch onto the skin every 72 hours for 30 days. 10 patch 0    empagliflozin (JARDIANCE) 10 MG tablet Take 10 mg by mouth daily      Probiotic Product (PROBIOTIC DAILY PO) Take by mouth      HYDROcodone-acetaminophen (NORCO) 7.5-325 MG per tablet Take 1 tablet by mouth every 4 hours as needed for Pain for up to 30 days. 120 tablet 0    gabapentin (NEURONTIN) 100 MG capsule Take 1 capsule by mouth 3 times daily (with meals) for 30 days.  90 capsule 3    megestrol (MEGACE) 40 MG/ML suspension TAKE 10 MLS BY MOUTH DAILY 240 mL 1    allopurinol (ZYLOPRIM) 300 MG tablet Take 1 tablet by mouth daily 30 tablet 0    ELIQUIS 5 MG TABS tablet   11    digoxin (LANOXIN) 125 MCG tablet   11    finasteride (PROSCAR) 5 MG tablet       Lidocaine-Prilocaine, Bulk, 2.5-2.5 % CREA Apply topically      loratadine (CLARITIN) 10 MG tablet Take 10 mg by mouth      melatonin 5 MG TABS tablet Take 5 mg by mouth      metoprolol tartrate (LOPRESSOR) 25 MG tablet       traZODone (DESYREL) 50 MG tablet Take 50 mg by mouth      B Complex CAPS Take 1 capsule by mouth 2 times daily      furosemide (LASIX) 20 MG tablet Take 20 mg by mouth every morning      potassium chloride (KLOR-CON M) 20 MEQ extended release tablet Take 1 tablet by mouth daily  11    metFORMIN (GLUCOPHAGE) 500 MG tablet Take 500 mg by mouth 2 times daily (with meals)      polyethylene glycol (GLYCOLAX) powder Take 17 g by mouth daily      UNABLE TO FIND        No current facility-administered medications for this visit.       Facility-Administered Medications Ordered in Other Visits   Medication Dose Route Frequency Provider Last Rate Last Dose    sodium chloride flush 0.9 % injection 10 mL  10 mL Intravenous PRN ELVA Cat   10 mL at 02/03/20 1607    heparin flush 100 UNIT/ML injection 300 Units  300 Units Intracatheter PRN ELVA Cat   300 Units at 02/03/20 1607    riTUXimab (RITUXAN) 690 mg in sodium chloride 0.9 % 500 mL chemo IVPB  375 mg/m2 Intravenous Continuous ELVA Cat   Stopped at 02/03/20 1442        REVIEW OF SYSTEMS:    Constitutional: no fever, no night sweats, fatigue; generalized weakness  HEENT: no blurring of vision, no double vision, no hearing difficulty, no tinnitus,no ulceration, no dental caries, no dysphagia  Lungs: no cough, shortness of breath, no wheeze  CVS: no palpitation, no chest pain, no shortness of breath;  GI: no abdominal pain, no nausea , no vomiting, no constipation;   JESSICA: no dysuria, frequency and urgency, no hematuria, no kidney stones; Contrast?     Answer:   Oral     Order Specific Question:   Reason for exam:     Answer:   Lymphoma    CT Chest W Contrast     Standing Status:   Future     Standing Expiration Date:   2/3/2021     Scheduling Instructions:      At [facility] Specific Question:   Reason for exam:     Answer:   Lymphoma    Comprehensive Metabolic Panel     Standing Status:   Future     Number of Occurrences:   1     Standing Expiration Date:   2/3/2021    Lactate Dehydrogenase     Standing Status:   Future     Number of Occurrences:   1     Standing Expiration Date:   2/3/2021    CBC Auto Differential     Standing Status:   Future     Standing Expiration Date:   2/3/2021      Rick Aponte was seen today for other. Diagnoses and all orders for this visit:    Diffuse large b-cell lymphoma, extranodal and solid organ sites [facility])  -     CT ABDOMEN PELVIS WO CONTRAST Additional Contrast? Oral; Future  -     CT Chest W Contrast; Future  -     Comprehensive Metabolic Panel; Future  -     Lactate Dehydrogenase; Future  -     CBC Auto Differential; Future    Chemotherapy management, encounter for    Care plan discussed with patient    Adverse effect of chemotherapy, subsequent encounter    Toxicity, late effect, due to drug, medicine, or biological substance       ABC phenotype Diffuse large B-cell lymphoma, stage IVb relapse (left chest wall) July 2019. Recommended:  Polatuzumab vedotin-piiq, 1.8 mg/kg by intravenous infusion, given on day 2 of cycle 1 and on day 1 of subsequent cycles. Bendamustine (70 mg/m2 intravenously) was administered on days 2 and 3 of cycle 1 and on days 1 and 2 of subsequent cycles. A rituximab (375 mg/m2 intravenously) was administered on day 1 of each cycle. Proceed cycle #5 today. Continue with reduced dose of bendamustine to 50 mg/m² today. Continue polatuzumab and rituximab. Repeat CT chest abdomen pelvis before next visit    Shingles RONALD extremity- s/p acyclovir.   Complete resolution of the

## 2020-01-31 RX ORDER — PANTOPRAZOLE SODIUM 40 MG/1
TABLET, DELAYED RELEASE ORAL
Qty: 60 TABLET | Refills: 2 | Status: SHIPPED | OUTPATIENT
Start: 2020-01-31

## 2020-02-03 ENCOUNTER — HOSPITAL ENCOUNTER (OUTPATIENT)
Dept: INFUSION THERAPY | Age: 80
Discharge: HOME | End: 2020-02-03
Payer: MEDICARE

## 2020-02-03 ENCOUNTER — TRANSCRIBE ORDERS (OUTPATIENT)
Dept: ADMINISTRATIVE | Facility: HOSPITAL | Age: 80
End: 2020-02-03

## 2020-02-03 ENCOUNTER — OFFICE VISIT (OUTPATIENT)
Dept: HEMATOLOGY | Age: 80
End: 2020-02-03
Payer: MEDICARE

## 2020-02-03 VITALS
SYSTOLIC BLOOD PRESSURE: 118 MMHG | HEART RATE: 78 BPM | TEMPERATURE: 96.6 F | WEIGHT: 141 LBS | BODY MASS INDEX: 18.1 KG/M2 | DIASTOLIC BLOOD PRESSURE: 60 MMHG | OXYGEN SATURATION: 96 %

## 2020-02-03 VITALS
HEART RATE: 76 BPM | DIASTOLIC BLOOD PRESSURE: 52 MMHG | WEIGHT: 141.1 LBS | SYSTOLIC BLOOD PRESSURE: 108 MMHG | HEIGHT: 74 IN | OXYGEN SATURATION: 95 % | BODY MASS INDEX: 18.11 KG/M2

## 2020-02-03 DIAGNOSIS — C83.39 DIFFUSE LARGE B-CELL LYMPHOMA OF EXTRANODAL SITE (HCC): Primary | ICD-10-CM

## 2020-02-03 DIAGNOSIS — T45.1X5A CHEMOTHERAPY-INDUCED NEUTROPENIA (HCC): Primary | ICD-10-CM

## 2020-02-03 DIAGNOSIS — C85.90 NON-HODGKIN'S LYMPHOMA, UNSPECIFIED BODY REGION, UNSPECIFIED NON-HODGKIN LYMPHOMA TYPE (HCC): ICD-10-CM

## 2020-02-03 DIAGNOSIS — D70.1 CHEMOTHERAPY-INDUCED NEUTROPENIA (HCC): Primary | ICD-10-CM

## 2020-02-03 DIAGNOSIS — C83.39 DIFFUSE LARGE B-CELL LYMPHOMA, EXTRANODAL AND SOLID ORGAN SITES (HCC): ICD-10-CM

## 2020-02-03 PROCEDURE — 6360000002 HC RX W HCPCS: Performed by: NURSE PRACTITIONER

## 2020-02-03 PROCEDURE — 85025 COMPLETE CBC W/AUTO DIFF WBC: CPT

## 2020-02-03 PROCEDURE — 96417 CHEMO IV INFUS EACH ADDL SEQ: CPT

## 2020-02-03 PROCEDURE — 96413 CHEMO IV INFUSION 1 HR: CPT

## 2020-02-03 PROCEDURE — 2580000003 HC RX 258: Performed by: NURSE PRACTITIONER

## 2020-02-03 PROCEDURE — 4040F PNEUMOC VAC/ADMIN/RCVD: CPT | Performed by: INTERNAL MEDICINE

## 2020-02-03 PROCEDURE — 2580000003 HC RX 258

## 2020-02-03 PROCEDURE — G8484 FLU IMMUNIZE NO ADMIN: HCPCS | Performed by: INTERNAL MEDICINE

## 2020-02-03 PROCEDURE — 1036F TOBACCO NON-USER: CPT | Performed by: INTERNAL MEDICINE

## 2020-02-03 PROCEDURE — 99214 OFFICE O/P EST MOD 30 MIN: CPT | Performed by: INTERNAL MEDICINE

## 2020-02-03 PROCEDURE — G8428 CUR MEDS NOT DOCUMENT: HCPCS | Performed by: INTERNAL MEDICINE

## 2020-02-03 PROCEDURE — 6360000002 HC RX W HCPCS

## 2020-02-03 PROCEDURE — 96375 TX/PRO/DX INJ NEW DRUG ADDON: CPT

## 2020-02-03 PROCEDURE — 6370000000 HC RX 637 (ALT 250 FOR IP): Performed by: NURSE PRACTITIONER

## 2020-02-03 PROCEDURE — 1123F ACP DISCUSS/DSCN MKR DOCD: CPT | Performed by: INTERNAL MEDICINE

## 2020-02-03 PROCEDURE — G8419 CALC BMI OUT NRM PARAM NOF/U: HCPCS | Performed by: INTERNAL MEDICINE

## 2020-02-03 RX ORDER — PALONOSETRON 0.05 MG/ML
0.25 INJECTION, SOLUTION INTRAVENOUS ONCE
Status: DISCONTINUED | OUTPATIENT
Start: 2020-02-03 | End: 2020-02-03

## 2020-02-03 RX ORDER — ACETAMINOPHEN 500 MG
1000 TABLET ORAL ONCE
Status: COMPLETED | OUTPATIENT
Start: 2020-02-03 | End: 2020-02-03

## 2020-02-03 RX ORDER — DEXAMETHASONE SODIUM PHOSPHATE 10 MG/ML
10 INJECTION, SOLUTION INTRAMUSCULAR; INTRAVENOUS ONCE
Status: COMPLETED | OUTPATIENT
Start: 2020-02-03 | End: 2020-02-03

## 2020-02-03 RX ORDER — PALONOSETRON 0.05 MG/ML
0.25 INJECTION, SOLUTION INTRAVENOUS ONCE
Status: COMPLETED | OUTPATIENT
Start: 2020-02-03 | End: 2020-02-03

## 2020-02-03 RX ORDER — HEPARIN SODIUM (PORCINE) LOCK FLUSH IV SOLN 100 UNIT/ML 100 UNIT/ML
300 SOLUTION INTRAVENOUS PRN
Status: DISCONTINUED | OUTPATIENT
Start: 2020-02-03 | End: 2020-02-04 | Stop reason: HOSPADM

## 2020-02-03 RX ORDER — SODIUM CHLORIDE 0.9 % (FLUSH) 0.9 %
10 SYRINGE (ML) INJECTION PRN
Status: DISCONTINUED | OUTPATIENT
Start: 2020-02-03 | End: 2020-02-04 | Stop reason: HOSPADM

## 2020-02-03 RX ORDER — DIPHENHYDRAMINE HYDROCHLORIDE 50 MG/ML
25 INJECTION INTRAMUSCULAR; INTRAVENOUS ONCE
Status: COMPLETED | OUTPATIENT
Start: 2020-02-03 | End: 2020-02-03

## 2020-02-03 RX ADMIN — SODIUM CHLORIDE, PRESERVATIVE FREE 10 ML: 5 INJECTION INTRAVENOUS at 16:07

## 2020-02-03 RX ADMIN — HEPARIN 300 UNITS: 100 SYRINGE at 16:07

## 2020-02-03 RX ADMIN — DIPHENHYDRAMINE HYDROCHLORIDE 25 MG: 50 INJECTION, SOLUTION INTRAMUSCULAR; INTRAVENOUS at 12:46

## 2020-02-03 RX ADMIN — RITUXIMAB 690 MG: 10 INJECTION, SOLUTION INTRAVENOUS at 14:06

## 2020-02-03 RX ADMIN — PALONOSETRON 0.25 MG: 0.05 INJECTION, SOLUTION INTRAVENOUS at 12:34

## 2020-02-03 RX ADMIN — BENDAMUSTINE HYDROCHLORIDE 92.5 MG: 25 INJECTION, SOLUTION INTRAVENOUS at 15:52

## 2020-02-03 RX ADMIN — POLATUZUMAB VEDOTIN 129 MG: 140 INJECTION, POWDER, LYOPHILIZED, FOR SOLUTION INTRAVENOUS at 13:20

## 2020-02-03 RX ADMIN — ACETAMINOPHEN 500 MG: 500 TABLET ORAL at 12:32

## 2020-02-03 RX ADMIN — DEXAMETHASONE SODIUM PHOSPHATE 10 MG: 10 INJECTION, SOLUTION INTRAMUSCULAR; INTRAVENOUS at 12:34

## 2020-02-04 ENCOUNTER — HOSPITAL ENCOUNTER (OUTPATIENT)
Dept: INFUSION THERAPY | Age: 80
Discharge: HOME | End: 2020-02-04
Payer: MEDICARE

## 2020-02-04 VITALS
SYSTOLIC BLOOD PRESSURE: 102 MMHG | OXYGEN SATURATION: 99 % | BODY MASS INDEX: 18.1 KG/M2 | DIASTOLIC BLOOD PRESSURE: 68 MMHG | HEART RATE: 91 BPM | HEIGHT: 74 IN | TEMPERATURE: 96.4 F

## 2020-02-04 DIAGNOSIS — C85.90 NON-HODGKIN'S LYMPHOMA, UNSPECIFIED BODY REGION, UNSPECIFIED NON-HODGKIN LYMPHOMA TYPE (HCC): ICD-10-CM

## 2020-02-04 DIAGNOSIS — C83.39 DIFFUSE LARGE B-CELL LYMPHOMA, EXTRANODAL AND SOLID ORGAN SITES (HCC): ICD-10-CM

## 2020-02-04 DIAGNOSIS — D70.1 CHEMOTHERAPY-INDUCED NEUTROPENIA (HCC): Primary | ICD-10-CM

## 2020-02-04 DIAGNOSIS — T45.1X5A CHEMOTHERAPY-INDUCED NEUTROPENIA (HCC): Primary | ICD-10-CM

## 2020-02-04 PROCEDURE — 2580000003 HC RX 258: Performed by: NURSE PRACTITIONER

## 2020-02-04 PROCEDURE — 2580000003 HC RX 258: Performed by: INTERNAL MEDICINE

## 2020-02-04 PROCEDURE — 96367 TX/PROPH/DG ADDL SEQ IV INF: CPT

## 2020-02-04 PROCEDURE — 96413 CHEMO IV INFUSION 1 HR: CPT

## 2020-02-04 PROCEDURE — 96377 APPLICATON ON-BODY INJECTOR: CPT

## 2020-02-04 PROCEDURE — 6360000002 HC RX W HCPCS: Performed by: INTERNAL MEDICINE

## 2020-02-04 PROCEDURE — 6360000002 HC RX W HCPCS: Performed by: NURSE PRACTITIONER

## 2020-02-04 RX ORDER — SODIUM CHLORIDE 0.9 % (FLUSH) 0.9 %
10 SYRINGE (ML) INJECTION PRN
Status: DISCONTINUED | OUTPATIENT
Start: 2020-02-04 | End: 2020-02-05 | Stop reason: HOSPADM

## 2020-02-04 RX ORDER — HEPARIN SODIUM (PORCINE) LOCK FLUSH IV SOLN 100 UNIT/ML 100 UNIT/ML
300 SOLUTION INTRAVENOUS PRN
Status: DISCONTINUED | OUTPATIENT
Start: 2020-02-04 | End: 2020-02-05 | Stop reason: HOSPADM

## 2020-02-04 RX ADMIN — BENDAMUSTINE HYDROCHLORIDE 97.5 MG: 25 INJECTION, SOLUTION INTRAVENOUS at 14:36

## 2020-02-04 RX ADMIN — PEGFILGRASTIM 6 MG: KIT SUBCUTANEOUS at 14:54

## 2020-02-04 RX ADMIN — HEPARIN 300 UNITS: 100 SYRINGE at 14:53

## 2020-02-04 RX ADMIN — ONDANSETRON: 2 INJECTION INTRAMUSCULAR; INTRAVENOUS at 14:00

## 2020-02-04 RX ADMIN — SODIUM CHLORIDE, PRESERVATIVE FREE 10 ML: 5 INJECTION INTRAVENOUS at 14:53

## 2020-02-04 ASSESSMENT — PAIN DESCRIPTION - ORIENTATION: ORIENTATION: LEFT

## 2020-02-04 ASSESSMENT — PAIN DESCRIPTION - LOCATION: LOCATION: ARM

## 2020-02-04 ASSESSMENT — PAIN SCALES - GENERAL: PAINLEVEL_OUTOF10: 6

## 2020-02-10 RX ORDER — HYDROCODONE BITARTRATE AND ACETAMINOPHEN 7.5; 325 MG/1; MG/1
1 TABLET ORAL EVERY 4 HOURS PRN
Qty: 120 TABLET | Refills: 0 | Status: SHIPPED | OUTPATIENT
Start: 2020-02-10 | End: 2020-03-11

## 2020-02-17 ENCOUNTER — HOSPITAL ENCOUNTER (OUTPATIENT)
Dept: CT IMAGING | Facility: HOSPITAL | Age: 80
Discharge: HOME | End: 2020-02-17
Admitting: INTERNAL MEDICINE

## 2020-02-17 DIAGNOSIS — C83.39 DIFFUSE LARGE B-CELL LYMPHOMA OF EXTRANODAL SITE (HCC): ICD-10-CM

## 2020-02-17 LAB — CREAT BLDA-MCNC: 0.8 MG/DL (ref 0.6–1.3)

## 2020-02-17 PROCEDURE — 71260 CT THORAX DX C+: CPT

## 2020-02-17 PROCEDURE — 25010000002 IOPAMIDOL 61 % SOLUTION: Performed by: INTERNAL MEDICINE

## 2020-02-17 PROCEDURE — 74177 CT ABD & PELVIS W/CONTRAST: CPT

## 2020-02-17 PROCEDURE — 82565 ASSAY OF CREATININE: CPT

## 2020-02-17 RX ADMIN — IOPAMIDOL 100 ML: 612 INJECTION, SOLUTION INTRAVENOUS at 10:57

## 2020-02-17 RX ADMIN — IOPAMIDOL 50 ML: 612 INJECTION, SOLUTION INTRAVENOUS at 10:57

## 2020-02-20 NOTE — PROGRESS NOTES
of 66-70%   1/18/2019- initiation of systemic chemotherapy with R CHOP.   3/4/2019 - initiated Revlimid 15 mg daily, days 1 through 10 on a 21 day cycle , with cycle #3 of R CHOP.   3/11/2019-CT chest, abdomen, pelvis showed almost complete resolution of previous sites of lymphoma. 5/14/2019-2-D echo showed EF 41-45%. Mitral valve regurgitation. He was hospitalized at [place] with heart failure. 5/28/2019-PET scan showed faint uptake in the left chest wall in the region of the previously seen hypermetabolic mass. No adjacent mass is noted. No hypermetabolic adenopathy. 7/16/2019-CT chest, abdomen, pelvis showed a 8.3 x 2.3 cm soft tissue mass involving left second, third, fourth and fifth ribs in the left upper pleura chest wall region consistent with lymphoma recurrence. 7/16/2019-referred for palliative radiation left chest wall mass. August 2019-Completion of definitive radiation. 9/26/2019- CT chest abdomen pelvis showed new 3.1 cm enhancing solid mass in the left tracheoesophageal groove at the level of the thyroid gland, concerning for disease progression. Previously identified recurrent left chest wall mass is essentially resolved, with only a minimal residual pleural thickening may reflect a reactive change and/or scarring. There is a 1.2 cm soft tissue nodule in the left AP window which is stable. New minimal groundglass nodularity in the left upper lobe which may reflect an early radiation-induced change if recent chest wall radiation. New 2.4 cm soft tissue implant identified in the fat just  above the left hemidiaphragm, concerning for lymphoma progression. Additional 1.4 cm soft tissue nodule in the retroperitoneal space adjacent to the aorta which is enlarged from the prior exam, also concerning for lymphoma. 10/7/2019- initiation of second line palliative chemotherapy with bendamustine/rituximab and Polatuzumab.    12/5/2019-CT chest abdomen pelvis after 3 cycles of treatment / REMOVAL / REPLACEMENT VENOUS ACCESS CATHETER N/A 2/5/2019    SINGLE LUMEN PORT PLACEMENT performed by Danny Figueroa MD at [address] [place] [address] MALIGNANT SKIN LESION EXCISION      PARATHYROIDECTOMY      PROSTATECTOMY          Social history:  Social History     Socioeconomic History    Marital status:      Spouse name: Not on file    Number of children: Not on file    Years of education: Not on file    Highest education level: Not on file   Occupational History    Not on file   Social Needs    Financial resource strain: Not on file    Food insecurity:     Worry: Not on file     Inability: Not on file    Transportation needs:     Medical: Not on file     Non-medical: Not on file   Tobacco Use    Smoking status: Never Smoker    Smokeless tobacco: Never Used   Substance and Sexual Activity    Alcohol use: No    Drug use: Not on file    Sexual activity: Not on file   Lifestyle    Physical activity:     Days per week: Not on file     Minutes per session: Not on file    Stress: Not on file   Relationships    Social connections:     Talks on phone: Not on file     Gets together: Not on file     Attends Spiritism service: Not on file     Active member of club or organization: Not on file     Attends meetings of clubs or organizations: Not on file     Relationship status: Not on file    Intimate partner violence:     Fear of current or ex partner: Not on file     Emotionally abused: Not on file     Physically abused: Not on file     Forced sexual activity: Not on file   Other Topics Concern    Not on file   Social History Narrative    Not on file        Family history:   Family History   Problem Relation Age of Onset    High Blood Pressure Mother     Heart Disease Mother     Other Father     Cancer Father         Current Outpatient Medications   Medication Sig Dispense Refill    fentaNYL (DURAGESIC) 12 MCG/HR Place 1 patch onto the skin every 72 hours for 30 days.  10 patch 0    fentaNYL Take 1 tablet by mouth daily 30 tablet 0     No current facility-administered medications for this visit. REVIEW OF SYSTEMS:    Constitutional: no fever, no night sweats, fatigue; generalized weakness, weight loss, anorexia  HEENT: no blurring of vision, no double vision, no hearing difficulty, no tinnitus,no ulceration, no dental caries, no dysphagia  Lungs: no cough, shortness of breath, no wheeze  CVS: no palpitation, no chest pain, no shortness of breath;  GI: no abdominal pain, no nausea , no vomiting, no constipation;   JESSICA: no dysuria, frequency and urgency, no hematuria, no kidney stones;   Musculoskeletal: no joint pain, swelling , stiffness;  Endocrine: no polyuria, polydypsia, no cold or heat intolerence; Hematology/lymphatic: no easy brusing or bleeding, no hx of clotting disorder; no peripheral adenopathy. Dermatology: no eczema, no pruritis; skin lesions consistent with shingles. Neurology: no syncope, no seizures, no numbness or tingling of hands, no numbness or tingling of feet, no paresis; memory issues    PHYSICAL EXAM:    Vitals signs:  BP (!) 132/58   Pulse 114   Ht 6' 2\" (1.88 m)   Wt 140 lb 11.2 oz (63.8 kg)   SpO2 97%   BMI 18.06 kg/m²    Pain scale:  Pain Score:  10 - Worst pain ever     CONSTITUTIONAL: Alert, appropriate,  acute distress due to pain, cachectic, chronically ill-appearing  EYES: Non icteric, EOM intact, pupils equal round and reactive to light and accommodation. ENT: Oral mucus membranes moist, no oral pharyngeal lesions. External inspection of ears and nose are normal.   NECK: Supple, no masses. No palpable thyroid mass    CHEST/LUNGS: CTA bilaterally, normal respiratory effort   CARDIOVASCULAR: RRR, no murmurs. No lower extremity edema   ABDOMEN: soft non-tender, active bowel sounds, no hepatosplenomegaly. No palpable masses. EXTREMITIES: warm, Full ROM of all fours extremities. No focal weakness.   SKIN: warm, dry with no rashes or lesions  LYMPH: No cervical, clavicular, axillary, or inguinal lymphadenopathy  NEUROLOGIC: follows commands, non focal.   PSYCH: mood and affect appropriate. Alert and oriented to time and place and person. Relevant Lab findings:  SHB:5/0/2185  WBC-5.76  HGB-10.0  PLT-168,000  Neut-1.17      Relevant Imaging studies   2/17/2020- CT chest abdomen pelvis showed evidence of disease recurrence. Specifically left chest wall recurrence mass measuring 10 cm. Mediastinal adenopathy. Development of left-sided pleural effusion. There has been interval development of a large baltazar masswithin the anterior medial epicardial fat pad at the level of and just below the level of the left ventricular apex. A 7 mm node was previously measured at this level. There is now a conglomerate neoplasm measuring 10 cm in transverse dimension by 4.1 cm in anterior to posterior  dimension by 9.1 cm in sagittal dimension. ASSESSMENT:    Orders Placed This Encounter   Procedures    External Referral To Hospice     Referral Priority:   Routine     Referral Type:   Eval and Treat     Referral Reason:   Specialty Services Required     Requested Specialty:   Hospice     Number of Visits Requested:   1      Jayson was seen today for lymphoma. Diagnoses and all orders for this visit:    Non-Hodgkin's lymphoma, unspecified body region, unspecified non-Hodgkin lymphoma type ([facility] [address]    Cancer associated pain  -     fentaNYL (DURAGESIC) 12 MCG/HR; Place 1 patch onto the skin every 72 hours for 30 days. -     fentaNYL (DURAGESIC) 25 MCG/HR; Place 1 patch onto the skin every 72 hours for 30 days.   -     Cancel: External Referral To Hospice  -     External Referral To Hospice    Diffuse large b-cell lymphoma, extranodal and solid organ sites [facility])    Chemotherapy management, encounter for    Care plan discussed with patient    Adverse effect of chemotherapy, subsequent encounter    Toxicity, late effect, due to drug, medicine, or biological substance    Herpes zoster with other complication    Cancer-related pain    Counseling regarding advanced care planning and goals of care    Coordination of complex care       ABC phenotype Diffuse large B-cell lymphoma, stage IVb relapse (left chest wall) July 2019. The patient was counseled today about diagnosis, staging, prognosis, diagnostic tests, medications, side effects and disease management. The method of counseling included verbal explanation. The patient verbalized understanding. Essentially, disease progression. He has received several lines of therapy without any improvement. Very poor performance status and poor prognosis due to recurrent disease. The patient is in excruciating pain. His pain medications was adjusted to fentanyl patch 37 mcg and continue hydrocodone as needed for pain. We had a long discussion about goals of care. This was discussed with the patient, wife and sons present. The patient at this point would like to focus on best supportive care therapy only. No further chemotherapy. Referred to outpatient hospice. Shingles RONALD extremity- s/p acyclovir. Complete resolution of the lesions. However patient has post herpetic neuralgia. Continue fentanyl 37mcg daily and Percocet as needed    Chemotherapy toxicity-LVEF 60%, fatigue, anemia. Normocytic Anemia- secondary to chemotherapy. No need for intervention. Hemoglobin 10. Deconditioning- encouraged increase physical activity. Systolic heart failure- stable. Last 2D echo at [facility] 11/10/2019 EF 60% with left diastolic dysfunction. Lavern Sullivan He is followed by the [facility] cardiology group. Memory loss- mild. This could be related to chemo brain. No intervention. Weight loss-  ? Summary of Plan:   1. Hospice referral  2. Increase fentanyl 37 mcg every  72 hours  3. Continue Norco PRN for pain  I have seen, examined and reviewed this patient medication list, appropriate labs and imaging studies.  I reviewed relevant medical records and others physicians notes. I discussed the plans of care with the patient. I answered all the questions to the patients satisfaction. No follow-ups on file. Data Jessa New LPN am scribing for Roger Escalera MD. Electronically signed by Jossue Morales LPN on 0/71/0923 at 8:93 PM    I, Dr Fernando Peralta, personally performed the services described in this documentation as scribed by Donita Wise MA in my presence and is both accurate and complete. Over 50% of the total visit time of 25 minutes in face to face encounter with the patient, out of which more than 50% of the time was spent in counseling patient or family and coordination of care. Counseling included but was not limited to time spent reviewing labs, imaging studies/ treatment plan and answering questions.

## 2020-02-21 ENCOUNTER — HOSPITAL ENCOUNTER (OUTPATIENT)
Dept: INFUSION THERAPY | Age: 80
Discharge: HOME | End: 2020-02-21
Payer: MEDICARE

## 2020-02-21 ENCOUNTER — OFFICE VISIT (OUTPATIENT)
Dept: HEMATOLOGY | Age: 80
End: 2020-02-21
Payer: MEDICARE

## 2020-02-21 VITALS
OXYGEN SATURATION: 97 % | SYSTOLIC BLOOD PRESSURE: 132 MMHG | WEIGHT: 140.7 LBS | HEIGHT: 74 IN | HEART RATE: 114 BPM | DIASTOLIC BLOOD PRESSURE: 58 MMHG | BODY MASS INDEX: 18.06 KG/M2

## 2020-02-21 DIAGNOSIS — C83.39 DIFFUSE LARGE B-CELL LYMPHOMA, EXTRANODAL AND SOLID ORGAN SITES (HCC): ICD-10-CM

## 2020-02-21 PROCEDURE — 99402 PREV MED CNSL INDIV APPRX 30: CPT | Performed by: INTERNAL MEDICINE

## 2020-02-21 PROCEDURE — 85025 COMPLETE CBC W/AUTO DIFF WBC: CPT

## 2020-02-21 PROCEDURE — 99212 OFFICE O/P EST SF 10 MIN: CPT

## 2020-02-21 PROCEDURE — G8419 CALC BMI OUT NRM PARAM NOF/U: HCPCS | Performed by: INTERNAL MEDICINE

## 2020-02-21 PROCEDURE — G8484 FLU IMMUNIZE NO ADMIN: HCPCS | Performed by: INTERNAL MEDICINE

## 2020-02-21 PROCEDURE — 4040F PNEUMOC VAC/ADMIN/RCVD: CPT | Performed by: INTERNAL MEDICINE

## 2020-02-21 PROCEDURE — 1036F TOBACCO NON-USER: CPT | Performed by: INTERNAL MEDICINE

## 2020-02-21 PROCEDURE — 1123F ACP DISCUSS/DSCN MKR DOCD: CPT | Performed by: INTERNAL MEDICINE

## 2020-02-21 PROCEDURE — G8427 DOCREV CUR MEDS BY ELIG CLIN: HCPCS | Performed by: INTERNAL MEDICINE

## 2020-02-21 PROCEDURE — 99214 OFFICE O/P EST MOD 30 MIN: CPT | Performed by: INTERNAL MEDICINE

## 2020-02-21 RX ORDER — FENTANYL 25 UG/H
1 PATCH TRANSDERMAL
Qty: 10 PATCH | Refills: 0 | Status: SHIPPED | OUTPATIENT
Start: 2020-02-21 | End: 2020-03-22

## 2020-02-21 RX ORDER — FENTANYL 12 UG/H
1 PATCH TRANSDERMAL
Qty: 10 PATCH | Refills: 0 | Status: SHIPPED | OUTPATIENT
Start: 2020-02-21 | End: 2020-03-22

## 2020-03-09 ENCOUNTER — TELEPHONE (OUTPATIENT)
Dept: HEMATOLOGY | Age: 80
End: 2020-03-09

## 2020-07-25 NOTE — PROGRESS NOTES
He was able to undergo his CT-guided needle biopsy on the morning of 12/26.  I told his family when I saw them on Christmas Eve but I will keep an eye out his pathology.  His tissue is still in the process of being worked up.  I called his home at [phone] this evening and spoke to his wife, Gina.  I informed her that I still do not have any results to give them.  I told her that I will be off this weekend, but will be working Monday night.  I will check on the progress of his sample at that time and give them a call.  He has an appointment to see Dr. Traylor on 1/3/19.     Reagan Tierney,   12/28/18  5:39 PM          
,DirectAddress_Unknown

## 2020-08-25 LAB — HOLD SPECIMEN: NORMAL

## 2021-06-21 NOTE — DISCHARGE SUMMARY
[facility] Medicine Services  DISCHARGE SUMMARY       Date of Admission: 11/9/2019  Date of Discharge:  11/10/2019  Primary Care Physician: Woody Mathis DO    Presenting Problem/History of Present Illness:  Word finding difficulty    Final Discharge Diagnoses:  Active Hospital Problems    Diagnosis   • Aphasia   • Hypertension   • Diabetes mellitus (CMS/HCC)   • Disease of thyroid gland   • Hyperlipidemia   • Diffuse large B cell lymphoma (CMS/HCC)   • Elevated troponin   • Persistent atrial fibrillation     Consults:   1.  Dr. Ulises Youssef- Neurology    Procedures Performed: None    Pertinent Test Results:   Lab Results (all)     Procedure Component Value Units Date/Time    Troponin [[id number]]  (Abnormal) Collected:  11/10/19 1432    Specimen:  Blood Updated:  11/10/19 1532     Troponin T 0.056 ng/mL     POC Glucose Once [[id number]]  (Abnormal) Collected:  11/10/19 1253    Specimen:  Blood Updated:  11/10/19 1304     Glucose 185 mg/dL      Comment: : [id number] Lesly VargasMeter ID: [id number]       POC Glucose Once [[id number]]  (Abnormal) Collected:  11/10/19 0851    Specimen:  Blood Updated:  11/10/19 0903     Glucose 159 mg/dL      Comment: : [id number] Cristal PateaMeter ID: [id number]       Troponin [[id number]]  (Abnormal) Collected:  11/10/19 0708    Specimen:  Blood Updated:  11/10/19 0746     Troponin T 0.073 ng/mL     CBC Auto Differential [[id number]]  (Abnormal) Collected:  11/10/19 0437    Specimen:  Blood Updated:  11/10/19 0644     WBC 5.99 10*3/mm3      RBC 2.68 10*6/mm3      Hemoglobin 9.0 g/dL      Hematocrit 26.6 %      MCV 99.3 fL      MCH 33.6 pg      MCHC 33.8 g/dL      RDW 16.3 %      RDW-SD 56.8 fl      MPV 11.7 fL      Platelets 127 10*3/mm3     Manual Differential [[id number]]  (Abnormal) Collected:  11/10/19 0437    Specimen:  Blood Updated:  11/10/19 0644     Neutrophil % 62.2 %      Lymphocyte % 11.2 %      Monocyte % 3.1 %   ----- Message from Brenda Bello MD sent at 6/19/2021  6:14 PM CDT -----  Please call patient with normal results         Eosinophil % 7.1 %      Basophil % 4.1 %      Bands %  12.2 %      Neutrophils Absolute 4.46 10*3/mm3      Lymphocytes Absolute 0.67 10*3/mm3      Monocytes Absolute 0.19 10*3/mm3      Eosinophils Absolute 0.43 10*3/mm3      Basophils Absolute 0.25 10*3/mm3      Anisocytosis Slight/1+     Polychromasia Slight/1+     WBC Morphology Normal     Platelet Morphology Normal    Troponin [[id number]]  (Abnormal) Collected:  11/10/19 0437    Specimen:  Blood Updated:  11/10/19 0640     Troponin T 0.071 ng/mL     Comprehensive Metabolic Panel [[id number]]  (Abnormal) Collected:  11/10/19 0437    Specimen:  Blood Updated:  11/10/19 0636     Glucose 154 mg/dL      BUN 16 mg/dL      Creatinine 0.74 mg/dL      Sodium 134 mmol/L      Potassium 3.8 mmol/L      Chloride 101 mmol/L      CO2 21.0 mmol/L      Calcium 8.6 mg/dL      Total Protein 5.5 g/dL      Albumin 3.50 g/dL      ALT (SGPT) 30 U/L      AST (SGOT) 23 U/L      Alkaline Phosphatase 95 U/L      Total Bilirubin 0.6 mg/dL      eGFR Non African Amer 102 mL/min/1.73      Globulin 2.0 gm/dL      A/G Ratio 1.8 g/dL      BUN/Creatinine Ratio 21.6     Anion Gap 12.0 mmol/L     Lipid Panel [[id number]]  (Abnormal) Collected:  11/10/19 0437    Specimen:  Blood Updated:  11/10/19 0636     Total Cholesterol 131 mg/dL      Triglycerides 422 mg/dL      HDL Cholesterol 18 mg/dL      LDL Cholesterol  --     Comment: Unable to calculate        VLDL Cholesterol --     Comment: Unable to calculate        LDL/HDL Ratio --     Comment: Unable to calculate       Magnesium [[id number]]  (Abnormal) Collected:  11/10/19 0437    Specimen:  Blood Updated:  11/10/19 0636     Magnesium 1.5 mg/dL     Phosphorus [[id number]]  (Abnormal) Collected:  11/10/19 0437    Specimen:  Blood Updated:  11/10/19 0636     Phosphorus 2.2 mg/dL     TSH [[id number]]  (Normal) Collected:  11/10/19 0437    Specimen:  Blood Updated:  11/10/19 0636     TSH 1.250 uIU/mL     LDL Cholesterol, Direct [[id number]] Collected:   11/10/19 0437    Specimen:  Blood Updated:  11/10/19 0636    POC Glucose Once [[id number]]  (Abnormal) Collected:  11/10/19 0244    Specimen:  Blood Updated:  11/10/19 0304     Glucose 147 mg/dL      Comment: : [id number] Nigel Rolle (Drury) AnnMeter ID: [id number]       Urinalysis, Microscopic Only - Urine, Clean Catch [[id number]]  (Abnormal) Collected:  11/09/19 2221    Specimen:  Urine, Clean Catch Updated:  11/09/19 2249     RBC, UA 6-12 /HPF      WBC, UA None Seen /HPF      Bacteria, UA None Seen /HPF      Squamous Epithelial Cells, UA None Seen /HPF      Hyaline Casts, UA None Seen /LPF      Methodology Automated Microscopy    Urinalysis With Microscopic If Indicated (No Culture) - Urine, Clean Catch [[id number]]  (Abnormal) Collected:  11/09/19 2221    Specimen:  Urine, Clean Catch Updated:  11/09/19 2249     Color, UA Yellow     Appearance, UA Clear     pH, UA 5.5     Specific Gravity, UA 1.010     Glucose, UA >=1000 mg/dL (3+)     Ketones, UA Negative     Bilirubin, UA Negative     Blood, UA Small (1+)     Protein, UA Negative     Leuk Esterase, UA Negative     Nitrite, UA Negative     Urobilinogen, UA 0.2 E.U./dL    Digoxin Level [[id number]]  (Normal) Collected:  11/09/19 2151    Specimen:  Blood Updated:  11/09/19 2241     Digoxin 0.90 ng/mL     Ammonia [[id number]]  (Normal) Collected:  11/09/19 2212    Specimen:  Blood Updated:  11/09/19 2236     Ammonia 24 umol/L     Comprehensive Metabolic Panel [[id number]]  (Abnormal) Collected:  11/09/19 2151    Specimen:  Blood Updated:  11/09/19 2223     Glucose 230 mg/dL      BUN 19 mg/dL      Creatinine 0.77 mg/dL      Sodium 132 mmol/L      Potassium 4.4 mmol/L      Chloride 96 mmol/L      CO2 21.0 mmol/L      Calcium 9.4 mg/dL      Total Protein 6.0 g/dL      Albumin 3.60 g/dL      ALT (SGPT) 34 U/L      AST (SGOT) 28 U/L      Alkaline Phosphatase 78 U/L      Total Bilirubin 0.5 mg/dL      eGFR Non African Amer 97 mL/min/1.73      Globulin 2.4 gm/dL      A/G  Ratio 1.5 g/dL      BUN/Creatinine Ratio 24.7     Anion Gap 15.0 mmol/L     CBC Auto Differential [[id number]]  (Abnormal) Collected:  11/09/19 2151    Specimen:  Blood Updated:  11/09/19 2210     WBC 6.88 10*3/mm3      RBC 2.88 10*6/mm3      Hemoglobin 9.7 g/dL      Hematocrit 28.6 %      MCV 99.3 fL      MCH 33.7 pg      MCHC 33.9 g/dL      RDW 16.4 %      RDW-SD 57.1 fl      MPV 10.7 fL      Platelets 124 10*3/mm3      Neutrophil % 66.7 %      Lymphocyte % 13.7 %      Monocyte % 11.5 %      Eosinophil % 6.7 %      Basophil % 0.4 %      Immature Grans % 1.0 %      Neutrophils, Absolute 4.59 10*3/mm3      Lymphocytes, Absolute 0.94 10*3/mm3      Monocytes, Absolute 0.79 10*3/mm3      Eosinophils, Absolute 0.46 10*3/mm3      Basophils, Absolute 0.03 10*3/mm3      Immature Grans, Absolute 0.07 10*3/mm3      nRBC 0.6 /100 WBC     Red Top [[id number]] Collected:  11/09/19 2151    Specimen:  Blood Updated:  11/09/19 2156        Imaging Results (All)     Procedure Component Value Units Date/Time    MRI Brain With & Without Contrast [[id number]] Collected:  11/10/19 1503     Updated:  11/10/19 1510    Narrative:       MRI BRAIN W WO CONTRAST- 11/10/2019 10:04 AM CST     HISTORY: Altered mental status, B-cell lymphoma out CVA; G45.9-Transient  cerebral ischemic attack, unspecified     COMPARISON: None.       TECHNIQUE: Multiplanar imaging of the brain was performed in a routine  fashion before and after the intravenous injection of gadolinium  contrast.     FINDINGS:   Diffusion: No restriction of diffusion to suggest acute ischemia.     Midline structures: Nondisplaced.     Ventricles: Normal in configuration and symmetric in size. There is mild  atrophy of the brain with prominence of the subarachnoid spaces and  ventricular enlargement.     Masses: No masses or mass effect.     Basilar cisterns: Maintained.     Extra axial space: No abnormal extra-axial fluid.     Gray-white matter signal: There are scattered foci of T2  abnormality  involving the periventricular and higher white matter tracts suggesting  small vessel disease.     Cerebellum: Normal.     Brainstem: Normal.     Enhancement: No abnormal enhancement.     Other: Proximal cervical spinal cord is normal. Bilateral globes and  orbits are normal in appearance. Normal cerebrovascular flow voids  noted. There is mucoperiosteal thickening of both maxillary sinuses as  well as of the ethmoid air cells consistent with chronic sinus disease.  The mastoid air cells are clear.       Impression:       1. There is atrophy of the brain. Small vessel ischemic changes are  noted involving periventricular and higher white matter tracts.  2. Chronic ethmoid air cells and bilateral maxillary sinus disease with  mucoperiosteal thickening. No air-fluid levels are present.  3. No restricted diffusion to suggest acute ischemia or infarct. There  are no areas of abnormal contrast enhancement present.         This report was finalized on 11/10/2019 15:07 by Dr. Octavio Ochoa MD.    SCANNED - IMAGING [[id number]] Resulted:  11/09/19      Updated:  11/10/19 1058    US Carotid Bilateral [[id number]] Updated:  11/10/19 0921    XR Chest 1 View [[id number]] Collected:  11/10/19 0752     Updated:  11/10/19 0757    Narrative:       EXAMINATION: Chest 1 view 11/09/2019     HISTORY: Confusion     FINDINGS: Upright frontal projection of the chest is compared to prior  exam of 07/16/2019. 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 There is a focus of nodular consolidation projecting  over the periphery of the left upper lobe. On CT examination of  September of this year there was a developing groundglass opacity in  this same distribution. At that time it was suspected to perhaps be  related to post radiation change. It does show progression from the  previous CT. The lungs are otherwise clear. There is no effusion  or free  air present. A tunneled infusion catheter is in place via left-sided  approach.       Impression:       1.. Nodular consolidation in the periphery of the left upper lobe may  represent post treatment changes. There has been marked interval  decrease in size of the pleural-based mass within the periphery of the  left upper lobe.  2. Lungs are otherwise clear.  This report was finalized on 11/10/2019 07:54 by Dr. Octavio Ochoa MD.    CT Head Without Contrast [[id number]] Collected:  11/10/19 0735     Updated:  11/10/19 0740    Narrative:       CT BRAIN without contrast 11/9/2019 10:33 PM CST     HISTORY: Neoplasm of brain suspected.     COMPARISON: None      DLP: 586 mGy cm. Automated exposure control was utilized to diminish  patient radiation dose.     TECHNIQUE: Serial axial tomographic images of the brain were obtained  without the use of intravenous contrast.      FINDINGS:   The midline structures are nondisplaced. There is mild cerebral and  cerebellar volume loss, with an associated increase in the prominence of  the ventricles and sulci. The basilar cisterns are normal in size and  configuration. There is no evidence of intracranial hemorrhage or  mass-effect. There is low attenuation in the periventricular white  matter, consistent with chronic ischemic change. There are no abnormal  extra-axial fluid collections. There is no evidence of tonsillar  herniation.      The included orbits and their contents are unremarkable. The visualized  paranasal sinuses, mastoid air cells and middle ear cavities are clear.  The visualized osseous structures and overlying soft tissues of the  skull and face are intact.        Impression:       Mild cerebral and cerebellar volume loss with chronic microvascular  disease but no evidence of acute intracranial process.        This report was finalized on 11/10/2019 07:37 by Dr. Octavio Ochoa MD.        Chief Complaint on Day of Discharge: [facility]  Course:  Mr. Nunez is a 79-year-old  male who follows Dr. Woody Mathis for primary care.  He has a past medical history significant for paroxysmal atrial fibrillation, diabetes mellitus, B-cell lymphoma, hypothyroidism, hyperlipidemia, and hypertension.  He presented to the [facility] emergency department on 11/9/2019 with word finding difficulty and which started at home.  In the emergency department, chest x-ray showed no acute changes.  Urinalysis was not indicative of infection.  EKG showed no acute ST-T wave changes.  CT of the head was negative.  The patient was admitted to the hospitalist service for further evaluation and management.    The patient underwent work-up for stroke and was seen in consultation by neurology.  MRI showed atrophy of the brain and small vessel ischemic changes without acute ischemic changes or infarct.  Transthoracic echocardiogram showed left ventricular diastolic dysfunction with ejection fraction of 60%.  No evidence of pulmonary hypertension was present.  Preliminary carotid ultrasound shows less than 50% stenosis bilaterally, final report is pending.  The patient can follow-up with neurology if needed, with the most likely his symptoms are metabolic in nature.    The patient did have atrial fibrillation with rapid ventricular response, which responded well to his home medication Lopressor.  He did have a mildly elevated troponin at 0.07, which has shows a downward trend.  He has no complaints of chest pain.  He does have some shortness of breath, which could likely be attributable to Atrial fibrillation and general deconditioning.  Lopressor will be increased at time of discharge.  Digoxin dose will stay as is.    Overall, the patient is hemodynamically stable and appropriate for discharge home with home health services today.  We have discussed the importance of nutrition and have discussed various supplements as well.  Patient will follow-up with  "his primary care provider in 1 week.    Condition on Discharge:  Stable    Physical Exam on Discharge:  /55 (BP Location: Left arm, Patient Position: Lying)   Pulse 104   Temp 98 °F (36.7 °C) (Oral)   Resp 16   Ht 188 cm (74.02\")   Wt 71.6 kg (157 lb 13.6 oz)   SpO2 95%   BMI 20.26 kg/m²   Physical Exam   Constitutional: He is oriented to person, place, and time. He appears well-developed. He appears ill. No distress.   HENT:   Head: Normocephalic and atraumatic.   Neck: Normal range of motion. Neck supple. No JVD present. No tracheal deviation present.   Cardiovascular: An irregularly irregular rhythm present. Exam reveals no gallop and no friction rub.   A. fib 109-126    Pulmonary/Chest: Effort normal. He has no wheezes. He has no rales.   Abdominal: Soft. Bowel sounds are normal. He exhibits no distension. There is no tenderness. There is no guarding.   Musculoskeletal: Normal range of motion. He exhibits no edema or tenderness.   Neurological: He is alert and oriented to person, place, and time. No cranial nerve deficit.   Skin: Skin is warm and dry. No rash noted. No erythema.   Psychiatric: He has a normal mood and affect. His behavior is normal. Judgment and thought content normal.   Vitals reviewed.    Discharge Disposition:  [place] [facility] [place]    Discharge Medications:     Discharge Medications      Changes to Medications      Instructions Start Date   furosemide 20 MG tablet  Commonly known as:  LASIX  What changed:  how much to take   20 mg, Oral, Daily      metFORMIN 1000 MG tablet  Commonly known as:  GLUCOPHAGE  What changed:  additional instructions   1,000 mg, Oral, 2 Times Daily With Meals, Hold this medication for 48 hours      metoprolol tartrate 25 MG tablet  Commonly known as:  LOPRESSOR  What changed:  how much to take   25 mg, Oral, Every 12 Hours Scheduled         Continue These Medications      Instructions Start Date   allopurinol 300 MG tablet  Commonly known as:  " ZYLOPRIM   300 mg, Oral, Daily      apixaban 5 MG tablet tablet  Commonly known as:  ELIQUIS   5 mg, Oral, Every 12 Hours Scheduled      CLARITIN 10 MG tablet  Generic drug:  loratadine   10 mg, Oral, Daily      digoxin 125 MCG tablet  Commonly known as:  LANOXIN   125 mcg, Oral, Daily Digoxin      finasteride 5 MG tablet  Commonly known as:  PROSCAR   5 mg, Oral, Daily      HYDROcodone-acetaminophen 7.5-325 MG per tablet  Commonly known as:  NORCO   1 tablet, Oral, Every 6 Hours PRN      JARDIANCE 10 MG tablet  Generic drug:  Empagliflozin   10 mg, Oral, Daily      Lidocaine-Prilocaine (Bulk) 2.5-2.5 % cream   1 application, Topical, As Needed      megestrol 40 MG/ML suspension  Commonly known as:  MEGACE   400 mg, Oral, Daily      melatonin 5 MG tablet tablet   5 mg, Oral, Nightly      pantoprazole 40 MG EC tablet  Commonly known as:  PROTONIX   40 mg, Oral, 2 Times Daily      potassium chloride 20 MEQ CR tablet  Commonly known as:  K-DUR,KLOR-CON   20 mEq, Oral, Daily      traZODone 50 MG tablet  Commonly known as:  DESYREL   50 mg, Oral, Nightly      vitamin b complex capsule capsule   1 capsule, Oral, Daily           Discharge Diet:   Diet Instructions     Diet: Regular; Thin      Discharge Diet:  Regular    Fluid Consistency:  Thin        Activity at Discharge:   Activity Instructions     Activity as Tolerated          Discharge Care Plan/Instructions:   1.   Return for any acute or worsening symptoms  2.  Home health for skilled nursing, physical, and occupational therapy  3.  Increased Lopressor to 25 mg   4.  Hold metformin for 48 hours following contrasted study  5.  Home health to draw a BMP on Wednesday with results to PCP    Follow-up Appointments:   1.  Primary care provider in 1 week  Future Appointments   Date Time Provider Department Center   12/6/2019  2:00 PM [facility] NP [facility]     Test Results Pending at Discharge: Final carotid ultrasound is pending.    Trinh Paul,  DERREK  11/10/19  3:39 PM    Time: 35 minutes    I personally evaluated and examined the patient in conjunction with DERREK Best and agree with the assessment, treatment plan, and disposition of the patient as recorded by her. My history, exam, and further recommendations are:     Patient seen and examined at bedside.  Patient overall back to baseline.  Patient would like to go home.  Patient likely had a combination of dehydration, chemotherapy, and debility causing a lot of his symptoms.  Had a goals of care discussion with family.    Reagan Rivera MD  11/12/19  8:18 AM

## (undated) DEVICE — 9165 UNIVERSAL PATIENT PLATE: Brand: 3M™

## (undated) DEVICE — STERILE LATEX POWDER FREE SURGICAL GLOVES WITH HYDROGEL COATING: Brand: PROTEXIS

## (undated) DEVICE — C-ARM: Brand: UNBRANDED

## (undated) DEVICE — SUTURE VCRL SZ 3-0 L27IN ABSRB UD L26MM SH 1/2 CIR J416H

## (undated) DEVICE — 3M™ STERI-STRIP™ REINFORCED ADHESIVE SKIN CLOSURES, R1542, 1/4 IN X 1-1/2 IN (6 MM X 38 MM), 6 STRIPS/ENVELOPE: Brand: 3M™ STERI-STRIP™

## (undated) DEVICE — MAJOR BSIN SETUP PK

## (undated) DEVICE — PLATE ES AD W 9FT CRD 2

## (undated) DEVICE — THREE QUARTER SHEET: Brand: CONVERTORS

## (undated) DEVICE — CHLORAPREP 26ML ORANGE

## (undated) DEVICE — SUTURE VCRL SZ 4-0 L18IN ABSRB UD L19MM PS-2 3/8 CIR PRIM J496H

## (undated) DEVICE — SPONGE GZ W4XL4IN RAYON POLY FILL CVR W/ NONWOVEN FAB

## (undated) DEVICE — STANDARD SURGICAL GOWN, L: Brand: CONVERTORS

## (undated) DEVICE — AIRLIFE™ NASAL OXYGEN CANNULA CURVED, NONFLARED TIP, WITH 7' FEET (2.1 M) CRUSH RESISTANT TUBING, OVER-THE-EAR STYLE: Brand: AIRLIFE™

## (undated) DEVICE — SUTURE VCRL SZ 2-0 L27IN ABSRB UD L26MM SH 1/2 CIR J417H

## (undated) DEVICE — ADHESIVE LIQ MASTISOL ST

## (undated) DEVICE — 3M™ TEGADERM™ TRANSPARENT FILM DRESSING FRAME STYLE, 1626, 4 IN X 4-3/4 IN (10 CM X 12 CM), 50/CT 4CT/CASE: Brand: 3M™ TEGADERM™

## (undated) DEVICE — ELECTROSURGICAL PENCIL BUTTON SWITCH NON COATED BLADE ELECTRODE 10 FT (3 M) CORD HOLSTER: Brand: MEGADYNE